# Patient Record
Sex: FEMALE | Race: WHITE | NOT HISPANIC OR LATINO | URBAN - METROPOLITAN AREA
[De-identification: names, ages, dates, MRNs, and addresses within clinical notes are randomized per-mention and may not be internally consistent; named-entity substitution may affect disease eponyms.]

---

## 2019-05-22 ENCOUNTER — EMERGENCY (EMERGENCY)
Facility: HOSPITAL | Age: 34
LOS: 1 days | Discharge: HOME | End: 2019-05-22
Attending: EMERGENCY MEDICINE
Payer: MEDICAID

## 2019-05-22 VITALS
RESPIRATION RATE: 16 BRPM | OXYGEN SATURATION: 95 % | DIASTOLIC BLOOD PRESSURE: 83 MMHG | HEART RATE: 97 BPM | WEIGHT: 210.1 LBS | SYSTOLIC BLOOD PRESSURE: 150 MMHG | TEMPERATURE: 98 F | HEIGHT: 66 IN

## 2019-05-22 PROCEDURE — 99283 EMERGENCY DEPT VISIT LOW MDM: CPT

## 2019-05-22 RX ORDER — HALOPERIDOL DECANOATE 100 MG/ML
2.5 INJECTION INTRAMUSCULAR ONCE
Refills: 0 | Status: COMPLETED | OUTPATIENT
Start: 2019-05-22 | End: 2019-05-22

## 2019-05-22 RX ADMIN — Medication 2 MILLIGRAM(S): at 23:35

## 2019-05-22 RX ADMIN — HALOPERIDOL DECANOATE 2.5 MILLIGRAM(S): 100 INJECTION INTRAMUSCULAR at 23:35

## 2019-05-23 VITALS
RESPIRATION RATE: 16 BRPM | OXYGEN SATURATION: 100 % | DIASTOLIC BLOOD PRESSURE: 70 MMHG | SYSTOLIC BLOOD PRESSURE: 130 MMHG | HEART RATE: 100 BPM

## 2019-05-23 PROCEDURE — 99285 EMERGENCY DEPT VISIT HI MDM: CPT | Mod: 25

## 2019-05-23 PROCEDURE — 82962 GLUCOSE BLOOD TEST: CPT

## 2019-05-23 PROCEDURE — 96372 THER/PROPH/DIAG INJ SC/IM: CPT | Mod: XU

## 2019-05-23 RX ADMIN — Medication 50 MILLIGRAM(S): at 06:22

## 2019-05-23 NOTE — ED PROVIDER NOTE - OBJECTIVE STATEMENT
33 y/o F with an unknown significant PMHx presents to the ED by EMS with c/o intoxication x today. Pt reports to drinking alcohol earlier tonight. Pt denies drug use, SI, or any other complaints. NKDA. 35 y/o F with an unknown significant PMHx presents to the ED by EMS with c/o intoxication x today. Pt reports to drinking alcohol earlier tonight. Pt denies drug use, SI, or any other complaints.

## 2019-05-23 NOTE — ED ADULT NURSE NOTE - NSIMPLEMENTINTERV_GEN_ALL_ED
Implemented All Fall Risk Interventions:  [place] to call system. Call bell, personal items and telephone within reach. Instruct patient to call for assistance. Room bathroom lighting operational. Non-slip footwear when patient is off stretcher. Physically safe environment: no spills, clutter or unnecessary equipment. Stretcher in lowest position, wheels locked, appropriate side rails in place. Provide visual cue, wrist band, yellow gown, etc. Monitor gait and stability. Monitor for mental status changes and reorient to person, place, and time. Review medications for side effects contributing to fall risk. Reinforce activity limits and safety measures with patient and family.
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
## 2019-05-23 NOTE — ED PROVIDER NOTE - PROGRESS NOTE DETAILS
Pt's  now in ED and states pt drinking alcohol daily. He knows pt was drinking etoh earlier today. He was in bed and heard a thud and found pt on floor, but does not know if she had seizure or just fell due to intox. Pt now awake, steady on her feet, ambulated to bathroom. No signs of etoh withdrawal. Will dc with PCP ge prn. Discussed indications for patient return to ED. Patient understood. Pt now awake, steady on her feet, ambulated to bathroom. Given librium b/c pt states that he will take pt to detox later today. Will dc with PCP fu prn. Discussed indications for patient return to ED. Patient understood. Pt now awake, steady on her feet, ambulated to bathroom. Given librium b/c pt's  states that he will take pt to detox later today. Will dc with PCP fu prn. Discussed indications for patient return to ED. Patient understood.

## 2019-10-16 ENCOUNTER — INPATIENT (INPATIENT)
Facility: HOSPITAL | Age: 34
LOS: 2 days | Discharge: LEFT BEFORE DISCHARGE | DRG: 894 | End: 2019-10-19
Attending: INTERNAL MEDICINE | Admitting: INTERNAL MEDICINE
Payer: MEDICAID

## 2019-10-16 VITALS
SYSTOLIC BLOOD PRESSURE: 140 MMHG | HEART RATE: 108 BPM | WEIGHT: 184.97 LBS | DIASTOLIC BLOOD PRESSURE: 87 MMHG | HEIGHT: 65 IN | TEMPERATURE: 98 F | OXYGEN SATURATION: 95 % | RESPIRATION RATE: 22 BRPM

## 2019-10-16 DIAGNOSIS — F10.929 ALCOHOL USE, UNSPECIFIED WITH INTOXICATION, UNSPECIFIED: ICD-10-CM

## 2019-10-16 DIAGNOSIS — F10.10 ALCOHOL ABUSE, UNCOMPLICATED: ICD-10-CM

## 2019-10-16 DIAGNOSIS — R56.9 UNSPECIFIED CONVULSIONS: ICD-10-CM

## 2019-10-16 DIAGNOSIS — Z29.9 ENCOUNTER FOR PROPHYLACTIC MEASURES, UNSPECIFIED: ICD-10-CM

## 2019-10-16 LAB
ALBUMIN SERPL ELPH-MCNC: 3.2 G/DL — LOW (ref 3.5–5)
ALBUMIN SERPL ELPH-MCNC: 3.6 G/DL — SIGNIFICANT CHANGE UP (ref 3.5–5)
ALP SERPL-CCNC: 41 U/L — SIGNIFICANT CHANGE UP (ref 40–120)
ALP SERPL-CCNC: 48 U/L — SIGNIFICANT CHANGE UP (ref 40–120)
ALT FLD-CCNC: 76 U/L DA — HIGH (ref 10–60)
ALT FLD-CCNC: 88 U/L DA — HIGH (ref 10–60)
ANION GAP SERPL CALC-SCNC: 6 MMOL/L — SIGNIFICANT CHANGE UP (ref 5–17)
ANION GAP SERPL CALC-SCNC: 7 MMOL/L — SIGNIFICANT CHANGE UP (ref 5–17)
AST SERPL-CCNC: 41 U/L — HIGH (ref 10–40)
AST SERPL-CCNC: 46 U/L — HIGH (ref 10–40)
BASOPHILS # BLD AUTO: 0.07 K/UL — SIGNIFICANT CHANGE UP (ref 0–0.2)
BASOPHILS NFR BLD AUTO: 0.9 % — SIGNIFICANT CHANGE UP (ref 0–2)
BILIRUB DIRECT SERPL-MCNC: 0.1 MG/DL — SIGNIFICANT CHANGE UP (ref 0–0.2)
BILIRUB INDIRECT FLD-MCNC: 0.2 MG/DL — SIGNIFICANT CHANGE UP (ref 0.2–1)
BILIRUB SERPL-MCNC: 0.2 MG/DL — SIGNIFICANT CHANGE UP (ref 0.2–1.2)
BILIRUB SERPL-MCNC: 0.3 MG/DL — SIGNIFICANT CHANGE UP (ref 0.2–1.2)
BUN SERPL-MCNC: 5 MG/DL — LOW (ref 7–18)
BUN SERPL-MCNC: 7 MG/DL — SIGNIFICANT CHANGE UP (ref 7–18)
CALCIUM SERPL-MCNC: 7.8 MG/DL — LOW (ref 8.4–10.5)
CALCIUM SERPL-MCNC: 8.1 MG/DL — LOW (ref 8.4–10.5)
CHLORIDE SERPL-SCNC: 107 MMOL/L — SIGNIFICANT CHANGE UP (ref 96–108)
CHLORIDE SERPL-SCNC: 113 MMOL/L — HIGH (ref 96–108)
CK SERPL-CCNC: 120 U/L — SIGNIFICANT CHANGE UP (ref 21–215)
CO2 SERPL-SCNC: 24 MMOL/L — SIGNIFICANT CHANGE UP (ref 22–31)
CO2 SERPL-SCNC: 25 MMOL/L — SIGNIFICANT CHANGE UP (ref 22–31)
CREAT SERPL-MCNC: 0.48 MG/DL — LOW (ref 0.5–1.3)
CREAT SERPL-MCNC: 0.68 MG/DL — SIGNIFICANT CHANGE UP (ref 0.5–1.3)
EOSINOPHIL # BLD AUTO: 0.08 K/UL — SIGNIFICANT CHANGE UP (ref 0–0.5)
EOSINOPHIL NFR BLD AUTO: 1 % — SIGNIFICANT CHANGE UP (ref 0–6)
ETHANOL SERPL-MCNC: 409 MG/DL — HIGH (ref 0–10)
GLUCOSE SERPL-MCNC: 76 MG/DL — SIGNIFICANT CHANGE UP (ref 70–99)
GLUCOSE SERPL-MCNC: 98 MG/DL — SIGNIFICANT CHANGE UP (ref 70–99)
HCG SERPL-ACNC: <1 MIU/ML — SIGNIFICANT CHANGE UP
HCT VFR BLD CALC: 44.8 % — SIGNIFICANT CHANGE UP (ref 34.5–45)
HGB BLD-MCNC: 14.7 G/DL — SIGNIFICANT CHANGE UP (ref 11.5–15.5)
IMM GRANULOCYTES NFR BLD AUTO: 0.6 % — SIGNIFICANT CHANGE UP (ref 0–1.5)
LACTATE SERPL-SCNC: 3.7 MMOL/L — HIGH (ref 0.7–2)
LYMPHOCYTES # BLD AUTO: 2.64 K/UL — SIGNIFICANT CHANGE UP (ref 1–3.3)
LYMPHOCYTES # BLD AUTO: 33.4 % — SIGNIFICANT CHANGE UP (ref 13–44)
MAGNESIUM SERPL-MCNC: 1.9 MG/DL — SIGNIFICANT CHANGE UP (ref 1.6–2.6)
MAGNESIUM SERPL-MCNC: 2 MG/DL — SIGNIFICANT CHANGE UP (ref 1.6–2.6)
MCHC RBC-ENTMCNC: 31 PG — SIGNIFICANT CHANGE UP (ref 27–34)
MCHC RBC-ENTMCNC: 32.8 GM/DL — SIGNIFICANT CHANGE UP (ref 32–36)
MCV RBC AUTO: 94.5 FL — SIGNIFICANT CHANGE UP (ref 80–100)
MONOCYTES # BLD AUTO: 0.68 K/UL — SIGNIFICANT CHANGE UP (ref 0–0.9)
MONOCYTES NFR BLD AUTO: 8.6 % — SIGNIFICANT CHANGE UP (ref 2–14)
NEUTROPHILS # BLD AUTO: 4.39 K/UL — SIGNIFICANT CHANGE UP (ref 1.8–7.4)
NEUTROPHILS NFR BLD AUTO: 55.5 % — SIGNIFICANT CHANGE UP (ref 43–77)
NRBC # BLD: 0 /100 WBCS — SIGNIFICANT CHANGE UP (ref 0–0)
PHOSPHATE SERPL-MCNC: 3.1 MG/DL — SIGNIFICANT CHANGE UP (ref 2.5–4.5)
PLATELET # BLD AUTO: 330 K/UL — SIGNIFICANT CHANGE UP (ref 150–400)
POTASSIUM SERPL-MCNC: 3.9 MMOL/L — SIGNIFICANT CHANGE UP (ref 3.5–5.3)
POTASSIUM SERPL-MCNC: 3.9 MMOL/L — SIGNIFICANT CHANGE UP (ref 3.5–5.3)
POTASSIUM SERPL-SCNC: 3.9 MMOL/L — SIGNIFICANT CHANGE UP (ref 3.5–5.3)
POTASSIUM SERPL-SCNC: 3.9 MMOL/L — SIGNIFICANT CHANGE UP (ref 3.5–5.3)
PROT SERPL-MCNC: 6.4 G/DL — SIGNIFICANT CHANGE UP (ref 6–8.3)
PROT SERPL-MCNC: 7 G/DL — SIGNIFICANT CHANGE UP (ref 6–8.3)
RBC # BLD: 4.74 M/UL — SIGNIFICANT CHANGE UP (ref 3.8–5.2)
RBC # FLD: 13.3 % — SIGNIFICANT CHANGE UP (ref 10.3–14.5)
SODIUM SERPL-SCNC: 139 MMOL/L — SIGNIFICANT CHANGE UP (ref 135–145)
SODIUM SERPL-SCNC: 143 MMOL/L — SIGNIFICANT CHANGE UP (ref 135–145)
WBC # BLD: 7.91 K/UL — SIGNIFICANT CHANGE UP (ref 3.8–10.5)
WBC # FLD AUTO: 7.91 K/UL — SIGNIFICANT CHANGE UP (ref 3.8–10.5)

## 2019-10-16 PROCEDURE — 99291 CRITICAL CARE FIRST HOUR: CPT

## 2019-10-16 PROCEDURE — 70450 CT HEAD/BRAIN W/O DYE: CPT | Mod: 26

## 2019-10-16 PROCEDURE — 99285 EMERGENCY DEPT VISIT HI MDM: CPT

## 2019-10-16 RX ORDER — DEXMEDETOMIDINE HYDROCHLORIDE IN 0.9% SODIUM CHLORIDE 4 UG/ML
0.1 INJECTION INTRAVENOUS
Qty: 200 | Refills: 0 | Status: DISCONTINUED | OUTPATIENT
Start: 2019-10-16 | End: 2019-10-18

## 2019-10-16 RX ORDER — THIAMINE MONONITRATE (VIT B1) 100 MG
500 TABLET ORAL THREE TIMES A DAY
Refills: 0 | Status: DISCONTINUED | OUTPATIENT
Start: 2019-10-16 | End: 2019-10-19

## 2019-10-16 RX ORDER — MIDAZOLAM HYDROCHLORIDE 1 MG/ML
2 INJECTION, SOLUTION INTRAMUSCULAR; INTRAVENOUS ONCE
Refills: 0 | Status: DISCONTINUED | OUTPATIENT
Start: 2019-10-16 | End: 2019-10-16

## 2019-10-16 RX ORDER — INFLUENZA VIRUS VACCINE 15; 15; 15; 15 UG/.5ML; UG/.5ML; UG/.5ML; UG/.5ML
0.5 SUSPENSION INTRAMUSCULAR ONCE
Refills: 0 | Status: DISCONTINUED | OUTPATIENT
Start: 2019-10-16 | End: 2019-10-19

## 2019-10-16 RX ORDER — SODIUM CHLORIDE 9 MG/ML
1000 INJECTION, SOLUTION INTRAVENOUS
Refills: 0 | Status: DISCONTINUED | OUTPATIENT
Start: 2019-10-16 | End: 2019-10-19

## 2019-10-16 RX ORDER — PHENOBARBITAL 60 MG
210 TABLET ORAL ONCE
Refills: 0 | Status: DISCONTINUED | OUTPATIENT
Start: 2019-10-16 | End: 2019-10-16

## 2019-10-16 RX ORDER — DEXMEDETOMIDINE HYDROCHLORIDE IN 0.9% SODIUM CHLORIDE 4 UG/ML
84 INJECTION INTRAVENOUS ONCE
Refills: 0 | Status: DISCONTINUED | OUTPATIENT
Start: 2019-10-16 | End: 2019-10-16

## 2019-10-16 RX ORDER — ENOXAPARIN SODIUM 100 MG/ML
40 INJECTION SUBCUTANEOUS DAILY
Refills: 0 | Status: DISCONTINUED | OUTPATIENT
Start: 2019-10-16 | End: 2019-10-19

## 2019-10-16 RX ORDER — SODIUM CHLORIDE 9 MG/ML
1000 INJECTION INTRAMUSCULAR; INTRAVENOUS; SUBCUTANEOUS ONCE
Refills: 0 | Status: COMPLETED | OUTPATIENT
Start: 2019-10-16 | End: 2019-10-16

## 2019-10-16 RX ORDER — DEXMEDETOMIDINE HYDROCHLORIDE IN 0.9% SODIUM CHLORIDE 4 UG/ML
42 INJECTION INTRAVENOUS ONCE
Refills: 0 | Status: DISCONTINUED | OUTPATIENT
Start: 2019-10-16 | End: 2019-10-18

## 2019-10-16 RX ORDER — POTASSIUM CHLORIDE 20 MEQ
10 PACKET (EA) ORAL
Refills: 0 | Status: COMPLETED | OUTPATIENT
Start: 2019-10-16 | End: 2019-10-16

## 2019-10-16 RX ORDER — HALOPERIDOL DECANOATE 100 MG/ML
2 INJECTION INTRAMUSCULAR ONCE
Refills: 0 | Status: COMPLETED | OUTPATIENT
Start: 2019-10-16 | End: 2019-10-16

## 2019-10-16 RX ADMIN — Medication 2 MILLIGRAM(S): at 17:26

## 2019-10-16 RX ADMIN — Medication 100 MILLIEQUIVALENT(S): at 20:54

## 2019-10-16 RX ADMIN — SODIUM CHLORIDE 1000 MILLILITER(S): 9 INJECTION INTRAMUSCULAR; INTRAVENOUS; SUBCUTANEOUS at 17:26

## 2019-10-16 RX ADMIN — MIDAZOLAM HYDROCHLORIDE 2 MILLIGRAM(S): 1 INJECTION, SOLUTION INTRAMUSCULAR; INTRAVENOUS at 16:28

## 2019-10-16 RX ADMIN — DEXMEDETOMIDINE HYDROCHLORIDE IN 0.9% SODIUM CHLORIDE 2.1 MICROGRAM(S)/KG/HR: 4 INJECTION INTRAVENOUS at 20:54

## 2019-10-16 RX ADMIN — MIDAZOLAM HYDROCHLORIDE 2 MILLIGRAM(S): 1 INJECTION, SOLUTION INTRAMUSCULAR; INTRAVENOUS at 18:00

## 2019-10-16 RX ADMIN — Medication 2 MILLIGRAM(S): at 16:20

## 2019-10-16 RX ADMIN — SODIUM CHLORIDE 75 MILLILITER(S): 9 INJECTION, SOLUTION INTRAVENOUS at 23:51

## 2019-10-16 RX ADMIN — Medication 2 MILLIGRAM(S): at 11:08

## 2019-10-16 RX ADMIN — Medication 100 MILLIEQUIVALENT(S): at 22:15

## 2019-10-16 RX ADMIN — Medication 2 MILLIGRAM(S): at 22:15

## 2019-10-16 RX ADMIN — Medication 100 MILLIEQUIVALENT(S): at 19:53

## 2019-10-16 RX ADMIN — Medication 2 MILLIGRAM(S): at 16:50

## 2019-10-16 RX ADMIN — Medication 105 MILLIGRAM(S): at 23:51

## 2019-10-16 RX ADMIN — HALOPERIDOL DECANOATE 2 MILLIGRAM(S): 100 INJECTION INTRAMUSCULAR at 17:40

## 2019-10-16 RX ADMIN — MIDAZOLAM HYDROCHLORIDE 2 MILLIGRAM(S): 1 INJECTION, SOLUTION INTRAMUSCULAR; INTRAVENOUS at 11:00

## 2019-10-16 RX ADMIN — ENOXAPARIN SODIUM 40 MILLIGRAM(S): 100 INJECTION SUBCUTANEOUS at 22:17

## 2019-10-16 RX ADMIN — MIDAZOLAM HYDROCHLORIDE 2 MILLIGRAM(S): 1 INJECTION, SOLUTION INTRAMUSCULAR; INTRAVENOUS at 16:50

## 2019-10-16 NOTE — ED ADULT NURSE NOTE - ED STAT RN HANDOFF DETAILS
Patient admitted to ICU with constant observation staff member at bedside. Patient assigned to ICU bed 4 , report given to WALDO Garcia. Patient to be transported via stretcher with cardiac monitor, by RN, MD and transporter with cardiac monitor in stable condition in no acute distress.

## 2019-10-16 NOTE — ED PROVIDER NOTE - CONSTITUTIONAL, MLM
normal... Well appearing, well nourished, awake, alert, oriented to person, place, time/situation and is agitated.

## 2019-10-16 NOTE — H&P ADULT - PROBLEM SELECTOR PLAN 2
-History of shaking episode with unresponsiveness at home  -No tongue bite or urinary/bowel incontinence  -no prior hx of seizures. could be secondary to alcohol intoxication   -CT head No acute intracranial hemorrhage, mass effect, or evidence of acute   vascular territorial infarction.  -f/u EEG  -Neurology consult Dr Dukes
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
## 2019-10-16 NOTE — H&P ADULT - ATTENDING COMMENTS
Patient seen and examined with the resident and agree with above    Plan    Patient with severe alcohol intoxication and possible seizure  ICU  Precedex drip and ATC IV ativan  Thiamine for chronic alcohol abuse and possible thiamine deficiency    D/W The patients

## 2019-10-16 NOTE — ED ADULT TRIAGE NOTE - CHIEF COMPLAINT QUOTE
Pt brought to ED by Ambulance, came off stretcher in 25H and ran through ED hallway agitated and screaming. H/o alcohol abuse

## 2019-10-16 NOTE — ED ADULT NURSE NOTE - NSIMPLEMENTINTERV_GEN_ALL_ED
Implemented All Fall with Harm Risk Interventions:  [place] to call system. Call bell, personal items and telephone within reach. Instruct patient to call for assistance. Room bathroom lighting operational. Non-slip footwear when patient is off stretcher. Physically safe environment: no spills, clutter or unnecessary equipment. Stretcher in lowest position, wheels locked, appropriate side rails in place. Provide visual cue, wrist band, yellow gown, etc. Monitor gait and stability. Monitor for mental status changes and reorient to person, place, and time. Review medications for side effects contributing to fall risk. Reinforce activity limits and safety measures with patient and family. Provide visual clues: red socks.

## 2019-10-16 NOTE — ED ADULT NURSE NOTE - OBJECTIVE STATEMENT
Patient present to ED BIBA by  for ETOH. As per  wife has been drink 1/2 gallon of tequila for past 5 days.. As per  patient has depression and that's why she drinks

## 2019-10-16 NOTE — CONSULT NOTE ADULT - SUBJECTIVE AND OBJECTIVE BOX
Patient was observed by  to have generalized body shaking, and was found to have elevated ethanol level.  She had been agitated, and received lorazepam and midazolam.    On current exam, patient is asleep with pinpoint pupils, nonverbal and not following commands, without rigidity or tremors x 4  CT Head was unremarkable    Dx: Alcohol withdrawal vs. New-onset seizure    Recs:  - EEG approved  - [facility] protocol for alcohol withdrawal  - No new head imaging now      NOTE TO BE COMPLETED - PLEASE REFER TO ABOVE ONLY AND IGNORE INFORMATION BELOW        NEUROLOGY CONSULT NOTE    NAME:  KENDRICK SANCHEZ    CHIEF COMPLAINT:  Patient is a 34y old  Female who presents with a chief complaint of Alcohol intoxication (16 Oct 2019 18:58)      HPI:  Patient is a 35 y/o F with PMH of depression comes in with witnessed seizure by spouse. As per Spouse patient is been drinking extensively for 2 weeks. Today he found patient shaking and unresponsive. He then called EMS. En route to the hospital patient became awake started to become agitated. She received 6 mg ativan and 6mg versed in ED. History was obtained by  at bed side. She is AAO x 2 to self and place. Asking for her anti depressant medication. She then became agitated and received a dose of haldol with versed ROS are limited due to mental status secondary to sedation. Her Blood alcohol level was elevated to 409. ICU was consulted for severe alcohol intoxication. (16 Oct 2019 18:58)      NEURO HPI:      PAST MEDICAL & SURGICAL HISTORY:  No pertinent past medical history  No significant past surgical history      MEDICATIONS:  dexmedetomidine Bolus 42 MICROGram(s) IV Bolus once  dexmedetomidine Infusion 0.1 MICROgram(s)/kG/Hr IV Continuous <Continuous>  enoxaparin Injectable 40 milliGRAM(s) SubCutaneous daily  LORazepam   Injectable 2 milliGRAM(s) IV Push every 4 hours  LORazepam   Injectable 2 milliGRAM(s) IV Push every 2 hours PRN  multivitamin/thiamine/folic acid in sodium chloride 0.9% 1000 milliLiter(s) IV Continuous <Continuous>  potassium chloride  10 mEq/100 mL IVPB 10 milliEquivalent(s) IV Intermittent every 1 hour  thiamine IVPB 500 milliGRAM(s) IV Intermittent three times a day      ALLERGIES:  No Known Allergies      FAMILY HISTORY:      SOCIAL HISTORY:  No toxic habits reported    REVIEW OF SYSTEMS:  GENERAL: No fever, weight changes, fatigue  EYES: No eye pain or discharge  EAR/NOSE/MOUTH/THROAT: No sinus or throat pain; No difficulty hearing  NECK: No pain or stiffness  RESPIRATORY: No cough, wheezing, chills, or hemoptysis  CARDIOVASCULAR: No chest pain, palpitations, shortness of breath, or dyspnea on exertion  GASTROINTESTINAL: No abdominal pain, nausea, vomiting, hematemesis, diarrhea, or constipation  GENITOURINARY: No dysuria, frequency, hematuria, or incontinence  SKIN: No rashes or lesions  ENDOCRINE: No heat or cold intolerance  HEMATOLOGIC: No easy bruising or bleeding  PSYCHIATRIC: No depression, anxiety, or mood swings  MUSCULOSKELETAL: No joint pain or swelling  NEUROLOGICAL: As per HPI      OBJECTIVE:    Vital Signs Last 24 Hrs  T(C): 98.1 (16 Oct 2019 19:20), Max: 98.1 (16 Oct 2019 19:20)  T(F): 208.5 (16 Oct 2019 19:20), Max: 208.5 (16 Oct 2019 19:20)  HR: 102 (16 Oct 2019 19:20) (102 - 110)  BP: 108/76 (16 Oct 2019 19:20) (108/76 - 140/87)  BP(mean): --  RR: 19 (16 Oct 2019 19:20) (19 - 22)  SpO2: 95% (16 Oct 2019 19:20) (94% - 97%)    General Examination:  General: No acute distress  HEENT: Atraumatic, Normocephalic  Respiratory: CTA B/l.  No crackles, rhonchi, or wheezes.  Cardiovascular: RRR.  Normal S1 & S2.  Normal b/l radial and pedal pulses.    Neurological Examination:  General / Mental Status: AAO x 3.  No aphasia or dysarthria.  Naming and repetition intact.  Cranial Nerves: VFF x 4.  PERRL.  EOMI x 2, No nystagmus or diplopia.  B/l V1-V3 equal and intact.  Symmetric facial movement and palate elevation.  B/l hearing equal to finger rub.  5/5 strength with b/l sternocleidomastoid & trapezius.  Midline tongue protrusion, with no atrophy or fasciculations.  Motor: Normal bulk & tone in all four extremities.  5/5 strength throughout all four extremities.  No downward drift, rigidity, spasticity, or tremors in any of the four extremities.  Sensory: Intact to light touch and pinprick in all four extremities.  Negative Romberg.  Reflex: 2+ and symmetric at b/l biceps, triceps, brachioradialis, patellae, and ankles.  Downgoing toes b/l.  Coordination: No dysmetria with b/l finger-to-nose and heel raise tests.  Symmetric rapid alternating movements b/l.  Gait: Normal, narrow-based gait.  No difficulty with tiptoe, heel, and tandem gaits.      Laboratory Values:    CBC Full  -  ( 16 Oct 2019 11:50 )  WBC Count : 7.91 K/uL  RBC Count : 4.74 M/uL  Hemoglobin : 14.7 g/dL  Hematocrit : 44.8 %  Platelet Count - Automated : 330 K/uL  Mean Cell Volume : 94.5 fl  Mean Cell Hemoglobin : 31.0 pg  Mean Cell Hemoglobin Concentration : 32.8 gm/dL  Auto Neutrophil # : 4.39 K/uL  Auto Lymphocyte # : 2.64 K/uL  Auto Monocyte # : 0.68 K/uL  Auto Eosinophil # : 0.08 K/uL  Auto Basophil # : 0.07 K/uL  Auto Neutrophil % : 55.5 %  Auto Lymphocyte % : 33.4 %  Auto Monocyte % : 8.6 %  Auto Eosinophil % : 1.0 %  Auto Basophil % : 0.9 %      10-16    143  |  113<H>  |  5<L>  ----------------------------<  76  3.9   |  24  |  0.48<L>    Ca    7.8<L>      16 Oct 2019 18:04  Phos  3.1     10-16  Mg     1.9     10-16    TPro  6.4  /  Alb  3.2<L>  /  TBili  0.3  /  DBili  0.1  /  AST  46<H>  /  ALT  76<H>  /  AlkPhos  41  10-16                      Neuroimaging:      Please contact the Neurology consult service with any questions.    Juanjose Dukes MD   of Neurology  [facility] at [facility] NEUROLOGY CONSULT NOTE    NAME:  KENDRICK SANCHEZ    CHIEF COMPLAINT:  Patient is a 34y old  Female who presents with a chief complaint of Alcohol intoxication (16 Oct 2019 18:58)    HPI:  Patient is a 33 y/o F with PMH of depression comes in with witnessed seizure by spouse. As per Spouse patient is been drinking extensively for 2 weeks. Today he found patient shaking and unresponsive. He then called EMS. En route to the hospital patient became awake started to become agitated. She received 6 mg ativan and 6mg versed in ED. History was obtained by  at bed side. She is AAO x 2 to self and place. Asking for her anti depressant medication. She then became agitated and received a dose of haldol with versed ROS are limited due to mental status secondary to sedation. Her Blood alcohol level was elevated to 409. ICU was consulted for severe alcohol intoxication. (16 Oct 2019 18:58)    NEURO HPI:  Patient was observed by  to have generalized body shaking, and was found to have elevated ethanol level.  She had been agitated, and received lorazepam and midazolam.    PAST MEDICAL & SURGICAL HISTORY:  No pertinent past medical history  No significant past surgical history    MEDICATIONS:  dexmedetomidine Bolus 42 MICROGram(s) IV Bolus once  dexmedetomidine Infusion 0.1 MICROgram(s)/kG/Hr IV Continuous <Continuous>  enoxaparin Injectable 40 milliGRAM(s) SubCutaneous daily  LORazepam   Injectable 2 milliGRAM(s) IV Push every 4 hours  LORazepam   Injectable 2 milliGRAM(s) IV Push every 2 hours PRN  multivitamin/thiamine/folic acid in sodium chloride 0.9% 1000 milliLiter(s) IV Continuous <Continuous>  potassium chloride  10 mEq/100 mL IVPB 10 milliEquivalent(s) IV Intermittent every 1 hour  thiamine IVPB 500 milliGRAM(s) IV Intermittent three times a day    ALLERGIES:  No Known Allergies    FAMILY HISTORY:  No family history of seizure reported    SOCIAL HISTORY:  Alcohol use as in HPI  No tobacco or recreational drug use reported    REVIEW OF SYSTEMS:  GENERAL: No fever, weight changes  EYES: No eye pain or discharge  EAR/NOSE/MOUTH/THROAT: No sinus or throat pain  NECK: No pain or stiffness  RESPIRATORY: No cough, wheezing  CARDIOVASCULAR: No chest pain, palpitations  GASTROINTESTINAL: No abdominal pain, nausea, vomiting  GENITOURINARY: No dysuria, frequency  SKIN: No rashes or lesions  ENDOCRINE: No heat or cold intolerance  HEMATOLOGIC: No easy bruising or bleeding  PSYCHIATRIC: Recent agitation  MUSCULOSKELETAL: No joint pain or swelling  NEUROLOGICAL: As per HPI      OBJECTIVE:    Vital Signs Last 24 Hrs  T(C): 98.1 (16 Oct 2019 19:20), Max: 98.1 (16 Oct 2019 19:20)  T(F): 208.5 (16 Oct 2019 19:20), Max: 208.5 (16 Oct 2019 19:20) <may be logged incorrectly>  HR: 102 (16 Oct 2019 19:20) (102 - 110)  BP: 108/76 (16 Oct 2019 19:20) (108/76 - 140/87)  RR: 19 (16 Oct 2019 19:20) (19 - 22)  SpO2: 95% (16 Oct 2019 19:20) (94% - 97%)    General Examination:  General: No acute distress  HEENT: Atraumatic, Normocephalic  Respiratory: CTA B/l.  No crackles, rhonchi, or wheezes.  Cardiovascular: Rapid rate, Regular rhythm.  Normal S1 & S2.  Normal b/l radial and pedal pulses.    Neurological Examination:  General / Mental Status: AAO x 3.  No aphasia or dysarthria.  Naming and repetition intact.  Cranial Nerves: VFF x 4.  PERRL.  EOMI x 2, No nystagmus or diplopia.  B/l V1-V3 equal and intact.  Symmetric facial movement and palate elevation.  B/l hearing equal to finger rub.  5/5 strength with b/l sternocleidomastoid & trapezius.  Midline tongue protrusion, with no atrophy or fasciculations.  Motor: Normal bulk & tone in all four extremities.  5/5 strength throughout all four extremities.  No downward drift, rigidity, spasticity, or tremors in any of the four extremities.  Sensory: Intact to light touch and pinprick in all four extremities.  Negative Romberg.  Reflex: 2+ and symmetric at b/l biceps, triceps, brachioradialis, patellae, and ankles.  Downgoing toes b/l.  Coordination: No dysmetria with b/l finger-to-nose and heel raise tests.  Symmetric rapid alternating movements b/l.  Gait and Romberg      On current exam, patient is asleep with pinpoint pupils, nonverbal and not following commands, without rigidity or tremors x 4  CT Head was unremarkable    Dx: Alcohol withdrawal vs. New-onset seizure    Recs:  - EEG approved  - [facility] protocol for alcohol withdrawal  - No new head imaging now    Laboratory Values:    CBC Full  -  ( 16 Oct 2019 11:50 )  WBC Count : 7.91 K/uL  RBC Count : 4.74 M/uL  Hemoglobin : 14.7 g/dL  Hematocrit : 44.8 %  Platelet Count - Automated : 330 K/uL  Mean Cell Volume : 94.5 fl  Mean Cell Hemoglobin : 31.0 pg  Mean Cell Hemoglobin Concentration : 32.8 gm/dL  Auto Neutrophil # : 4.39 K/uL  Auto Lymphocyte # : 2.64 K/uL  Auto Monocyte # : 0.68 K/uL  Auto Eosinophil # : 0.08 K/uL  Auto Basophil # : 0.07 K/uL  Auto Neutrophil % : 55.5 %  Auto Lymphocyte % : 33.4 %  Auto Monocyte % : 8.6 %  Auto Eosinophil % : 1.0 %  Auto Basophil % : 0.9 %      10-16    143  |  113<H>  |  5<L>  ----------------------------<  76  3.9   |  24  |  0.48<L>    Ca    7.8<L>      16 Oct 2019 18:04  Phos  3.1     10-16  Mg     1.9     10-16    TPro  6.4  /  Alb  3.2<L>  /  TBili  0.3  /  DBili  0.1  /  AST  46<H>  /  ALT  76<H>  /  AlkPhos  41  10-16                      Neuroimaging:      Please contact the Neurology consult service with any questions.    Juanjose Dukes MD   of Neurology  [facility] [employer] [facility] at [facility] NEUROLOGY CONSULT NOTE    NAME:  KENDRICK SANCHEZ    CHIEF COMPLAINT:  Patient is a 34y old  Female who presents with a chief complaint of Alcohol intoxication (16 Oct 2019 18:58)    HPI:  Patient is a 35 y/o F with PMH of depression comes in with witnessed seizure by spouse. As per Spouse patient is been drinking extensively for 2 weeks. Today he found patient shaking and unresponsive. He then called EMS. En route to the hospital patient became awake started to become agitated. She received 6 mg ativan and 6mg versed in ED. History was obtained by  at bed side. She is AAO x 2 to self and place. Asking for her anti depressant medication. She then became agitated and received a dose of haldol with versed ROS are limited due to mental status secondary to sedation. Her Blood alcohol level was elevated to 409. ICU was consulted for severe alcohol intoxication. (16 Oct 2019 18:58)    NEURO HPI:  34F patient was observed by  to have generalized body shaking, and was found to have elevated ethanol level. She had been agitated, and received lorazepam and midazolam.    PAST MEDICAL & SURGICAL HISTORY:  No pertinent past medical history  No significant past surgical history    MEDICATIONS:  dexmedetomidine Bolus 42 MICROGram(s) IV Bolus once  dexmedetomidine Infusion 0.1 MICROgram(s)/kG/Hr IV Continuous <Continuous>  enoxaparin Injectable 40 milliGRAM(s) SubCutaneous daily  LORazepam   Injectable 2 milliGRAM(s) IV Push every 4 hours  LORazepam   Injectable 2 milliGRAM(s) IV Push every 2 hours PRN  multivitamin/thiamine/folic acid in sodium chloride 0.9% 1000 milliLiter(s) IV Continuous <Continuous>  potassium chloride  10 mEq/100 mL IVPB 10 milliEquivalent(s) IV Intermittent every 1 hour  thiamine IVPB 500 milliGRAM(s) IV Intermittent three times a day    ALLERGIES:  No Known Allergies    FAMILY HISTORY:  No family history of seizure reported    SOCIAL HISTORY:  Alcohol use as in HPI  No tobacco or recreational drug use reported    REVIEW OF SYSTEMS:  GENERAL: No fever, weight changes  EYES: No eye pain or discharge  EAR/NOSE/MOUTH/THROAT: No sinus or throat pain  NECK: No pain or stiffness  RESPIRATORY: No cough, wheezing  CARDIOVASCULAR: No chest pain, palpitations  GASTROINTESTINAL: No abdominal pain, nausea, vomiting  GENITOURINARY: No dysuria, frequency  SKIN: No rashes or lesions  ENDOCRINE: No heat or cold intolerance  HEMATOLOGIC: No easy bruising or bleeding  PSYCHIATRIC: Recent agitation  MUSCULOSKELETAL: No joint pain or swelling  NEUROLOGICAL: As per HPI      OBJECTIVE:    Vital Signs Last 24 Hrs  T(C): 98.1 (16 Oct 2019 19:20), Max: 98.1 (16 Oct 2019 19:20)  T(F): 208.5 (16 Oct 2019 19:20), Max: 208.5 (16 Oct 2019 19:20) <may be logged incorrectly>  HR: 102 (16 Oct 2019 19:20) (102 - 110)  BP: 108/76 (16 Oct 2019 19:20) (108/76 - 140/87)  RR: 19 (16 Oct 2019 19:20) (19 - 22)  SpO2: 95% (16 Oct 2019 19:20) (94% - 97%)    General Examination:  General: No acute distress  HEENT: Atraumatic, Normocephalic  Respiratory: Rapid rate, CTA B/l.  No crackles, rhonchi, or wheezes.  Cardiovascular: Rapid rate, Regular rhythm.  Normal S1 & S2.  Normal b/l radial and pedal pulses.    Neurological Exam:  General / Mental Status: Asleep with difficulty arousing to voice.  Nonverbal, Does not follow commands.  Neurological exam is limited.  Cranial Nerves: Pinpoint pupils present, not further reactive to light.  Blink to threat absent b/l, Does not track finger movements with eyes b/l.  No response to pinprick at b/l V1-V3 nor to snap at b/l ears.  No apparent facial asymmetry.  Midline palate and tongue.  No resistance to passive motion of neck b/l.  Motor: Diminished bulk and tone in all four extremities.  Patient does not participate in formal muscle strength testing; all four extremities drop to bed after passive elevation.  No spontaneous movement, tremors, rigidity, or spasticity in any of the four extremities.  Sensation: Withdraws to nailbed pressure in all four extremities.  Reflexes: 1+ and symmetric at b/l biceps, triceps, brachioradialis, patellae, and ankles.  Toes mute b/l.  Unable to assess coordination, Romberg sign, or gait in minimally responsive patient.      Laboratory Values:    CBC Full  -  ( 16 Oct 2019 11:50 )  WBC Count : 7.91 K/uL  RBC Count : 4.74 M/uL  Hemoglobin : 14.7 g/dL  Hematocrit : 44.8 %  Platelet Count - Automated : 330 K/uL  Mean Cell Volume : 94.5 fl  Mean Cell Hemoglobin : 31.0 pg  Mean Cell Hemoglobin Concentration : 32.8 gm/dL  Auto Neutrophil # : 4.39 K/uL  Auto Lymphocyte # : 2.64 K/uL  Auto Monocyte # : 0.68 K/uL  Auto Eosinophil # : 0.08 K/uL  Auto Basophil # : 0.07 K/uL  Auto Neutrophil % : 55.5 %  Auto Lymphocyte % : 33.4 %  Auto Monocyte % : 8.6 %  Auto Eosinophil % : 1.0 %  Auto Basophil % : 0.9 %      10-16    143  |  113<H>  |  5<L>  ----------------------------<  76  3.9   |  24  |  0.48<L>    Ca    7.8<L>      16 Oct 2019 18:04  Phos  3.1     10-16  Mg     1.9     10-16    TPro  6.4  /  Alb  3.2<L>  /  TBili  0.3  /  DBili  0.1  /  AST  46<H>  /  ALT  76<H>  /  AlkPhos  41  10-16      Neuroimaging:    CT Head (10/16/19): No acute intracranial abnormality      Assessment:  34F with alcohol withdrawal seizure vs. new-onset epileptic seizure in the setting of heavy alcohol use      Recommendations:    - Routine EEG approved to evaluate for seizure tendency vs. subclinical seizures    - [facility] protocol for alcohol withdrawal    - No new head imaging now      Please contact the Neurology consult service with any questions.    Juanjose Dukes MD   of Neurology  [facility] at [facility]

## 2019-10-16 NOTE — H&P ADULT - HISTORY OF PRESENT ILLNESS
Patient is a 33 y/o F with PMH of depression comes in with witnessed seizure by spouse. As per Spouse patient is been drinking extensively for 2 weeks. Today he found patient shaking and unresponsive. He then called EMS. En route to the hospital patient became awake started to become agitated. She received 6 mg ativan and 6mg versed in ED. History was obtained by  at bed side. She is AAO x 2 to self and place. Asking for her anti depressant medication. She then became agitated and received a dose of haldol with versed ROS are limited due to mental status secondary to sedation. Her Blood alcohol level was elevated to 409. ICU was consulted for severe alcohol intoxication.

## 2019-10-16 NOTE — H&P ADULT - NSHPSOCIALHISTORY_GEN_ALL_CORE
drinks etoh every day. unable to provide remaining social history drinks etoh every day all day  Smokes up to 2 packs a day

## 2019-10-16 NOTE — H&P ADULT - NSHPPHYSICALEXAM_GEN_ALL_CORE
Vital Signs Last 24 Hrs  T(C): 36.8 (16 Oct 2019 16:43), Max: 36.8 (16 Oct 2019 16:43)  T(F): 98.3 (16 Oct 2019 16:43), Max: 98.3 (16 Oct 2019 16:43)  HR: 110 (16 Oct 2019 18:01) (105 - 110)  BP: 123/76 (16 Oct 2019 18:01) (119/62 - 140/87)  BP(mean): --  RR: 20 (16 Oct 2019 18:01) (20 - 22)  SpO2: 94% (16 Oct 2019 18:01) (94% - 97%)    ________________________________________________  PHYSICAL EXAM:  GENERAL: NAD  HEENT: Normocephalic;  conjunctivae and sclerae clear; dry mucous membranes;   NECK : supple  CHEST/LUNG: Clear to auscultation bilaterally with good air entry   HEART: S1 S2  regular; no murmurs, gallops or rubs  ABDOMEN: Soft, Nontender, Nondistended; Bowel sounds present  EXTREMITIES: no cyanosis; no edema; no calf tenderness  SKIN: warm and dry; no rash  NERVOUS SYSTEM:  sedated. Arousal to verbal stimuli. Moving all four extremities equally

## 2019-10-16 NOTE — ED PROVIDER NOTE - OBJECTIVE STATEMENT
Pt is a 34y F with no significant PMHx and no significant PSHx presenting to the ED for a witnessed seizure by . As per , pt last drank alcohol 5 days prior. Pt has NKDA and denies any additional complaints at this time. Pt is a 34y F with no significant PMHx and no significant PSHx presenting to the ED for a witnessed seizure by . As per , patient has been binge drinking for 5 days. last drink this morning. Pt has NKDA and denies any additional complaints at this time.

## 2019-10-16 NOTE — H&P ADULT - PROBLEM SELECTOR PLAN 3
RISK                                                          Points  [] Previous VTE                                           3  [] Thrombophilia                                        2  [] Lower limb paralysis                              2   [] Current Cancer                                       2   [x] Immobilization > 24 hrs                        1  [x] ICU/CCU stay > 24 hours                       1  [] Age > 60                                                   1    DVT prophylaxis with Lovenox

## 2019-10-16 NOTE — H&P ADULT - PROBLEM SELECTOR PLAN 1
-pt comes in with alcohol intoxication  -BAL elevated  -Will start on dexmedetomidine bolus and infusion with RAAS -2   -Ativan 2mg Q4   -Ativan 2mg Q2 prn for CIWA >7   -Started banana bag  -Start high dose thiamine 500mg TID x 5 days   -keep NPO   -History of depression, Primary team to obtain home medications list when patient is more sobar -pt comes in with alcohol intoxication  -BAL elevated  -Will start on dexmedetomidine bolus and infusion with RAAS -2   -Ativan 2mg Q4   -Ativan 2mg Q2 prn for CIWA >7   -Started banana bag  -Start high dose thiamine 500mg TID x 5 days   -keep NPO   -History of depression, Primary team to obtain home medications list when patient is more sober

## 2019-10-16 NOTE — CONSULT NOTE ADULT - ATTENDING COMMENTS
I counseled the primary team about pursuing EEG evaluation to determine if the patient has seizure tendency or subclinical seizures.

## 2019-10-17 LAB
ALBUMIN SERPL ELPH-MCNC: 2.6 G/DL — LOW (ref 3.5–5)
ALP SERPL-CCNC: 38 U/L — LOW (ref 40–120)
ALT FLD-CCNC: 63 U/L DA — HIGH (ref 10–60)
ANION GAP SERPL CALC-SCNC: 8 MMOL/L — SIGNIFICANT CHANGE UP (ref 5–17)
AST SERPL-CCNC: 35 U/L — SIGNIFICANT CHANGE UP (ref 10–40)
BASOPHILS # BLD AUTO: 0.06 K/UL — SIGNIFICANT CHANGE UP (ref 0–0.2)
BASOPHILS NFR BLD AUTO: 0.6 % — SIGNIFICANT CHANGE UP (ref 0–2)
BILIRUB SERPL-MCNC: 0.5 MG/DL — SIGNIFICANT CHANGE UP (ref 0.2–1.2)
BUN SERPL-MCNC: 7 MG/DL — SIGNIFICANT CHANGE UP (ref 7–18)
CALCIUM SERPL-MCNC: 7.7 MG/DL — LOW (ref 8.4–10.5)
CHLORIDE SERPL-SCNC: 111 MMOL/L — HIGH (ref 96–108)
CO2 SERPL-SCNC: 21 MMOL/L — LOW (ref 22–31)
CREAT SERPL-MCNC: 0.51 MG/DL — SIGNIFICANT CHANGE UP (ref 0.5–1.3)
EOSINOPHIL # BLD AUTO: 0.24 K/UL — SIGNIFICANT CHANGE UP (ref 0–0.5)
EOSINOPHIL NFR BLD AUTO: 2.5 % — SIGNIFICANT CHANGE UP (ref 0–6)
GLUCOSE SERPL-MCNC: 65 MG/DL — LOW (ref 70–99)
HCT VFR BLD CALC: 38.4 % — SIGNIFICANT CHANGE UP (ref 34.5–45)
HGB BLD-MCNC: 12.4 G/DL — SIGNIFICANT CHANGE UP (ref 11.5–15.5)
IMM GRANULOCYTES NFR BLD AUTO: 0.4 % — SIGNIFICANT CHANGE UP (ref 0–1.5)
LACTATE SERPL-SCNC: 0.7 MMOL/L — SIGNIFICANT CHANGE UP (ref 0.7–2)
LYMPHOCYTES # BLD AUTO: 2.73 K/UL — SIGNIFICANT CHANGE UP (ref 1–3.3)
LYMPHOCYTES # BLD AUTO: 28.1 % — SIGNIFICANT CHANGE UP (ref 13–44)
MAGNESIUM SERPL-MCNC: 1.6 MG/DL — SIGNIFICANT CHANGE UP (ref 1.6–2.6)
MCHC RBC-ENTMCNC: 31 PG — SIGNIFICANT CHANGE UP (ref 27–34)
MCHC RBC-ENTMCNC: 32.3 GM/DL — SIGNIFICANT CHANGE UP (ref 32–36)
MCV RBC AUTO: 96 FL — SIGNIFICANT CHANGE UP (ref 80–100)
MONOCYTES # BLD AUTO: 0.68 K/UL — SIGNIFICANT CHANGE UP (ref 0–0.9)
MONOCYTES NFR BLD AUTO: 7 % — SIGNIFICANT CHANGE UP (ref 2–14)
NEUTROPHILS # BLD AUTO: 5.97 K/UL — SIGNIFICANT CHANGE UP (ref 1.8–7.4)
NEUTROPHILS NFR BLD AUTO: 61.4 % — SIGNIFICANT CHANGE UP (ref 43–77)
NRBC # BLD: 0 /100 WBCS — SIGNIFICANT CHANGE UP (ref 0–0)
PHOSPHATE SERPL-MCNC: 2.7 MG/DL — SIGNIFICANT CHANGE UP (ref 2.5–4.5)
PLATELET # BLD AUTO: 267 K/UL — SIGNIFICANT CHANGE UP (ref 150–400)
POTASSIUM SERPL-MCNC: 4 MMOL/L — SIGNIFICANT CHANGE UP (ref 3.5–5.3)
POTASSIUM SERPL-SCNC: 4 MMOL/L — SIGNIFICANT CHANGE UP (ref 3.5–5.3)
PROT SERPL-MCNC: 5.6 G/DL — LOW (ref 6–8.3)
RBC # BLD: 4 M/UL — SIGNIFICANT CHANGE UP (ref 3.8–5.2)
RBC # FLD: 13.2 % — SIGNIFICANT CHANGE UP (ref 10.3–14.5)
SODIUM SERPL-SCNC: 140 MMOL/L — SIGNIFICANT CHANGE UP (ref 135–145)
WBC # BLD: 9.72 K/UL — SIGNIFICANT CHANGE UP (ref 3.8–10.5)
WBC # FLD AUTO: 9.72 K/UL — SIGNIFICANT CHANGE UP (ref 3.8–10.5)

## 2019-10-17 PROCEDURE — 73030 X-RAY EXAM OF SHOULDER: CPT | Mod: 26,LT

## 2019-10-17 PROCEDURE — 99291 CRITICAL CARE FIRST HOUR: CPT

## 2019-10-17 PROCEDURE — 95819 EEG AWAKE AND ASLEEP: CPT | Mod: 26

## 2019-10-17 RX ORDER — NICOTINE POLACRILEX 2 MG
1 GUM BUCCAL DAILY
Refills: 0 | Status: DISCONTINUED | OUTPATIENT
Start: 2019-10-17 | End: 2019-10-19

## 2019-10-17 RX ORDER — SODIUM CHLORIDE 9 MG/ML
1000 INJECTION, SOLUTION INTRAVENOUS ONCE
Refills: 0 | Status: DISCONTINUED | OUTPATIENT
Start: 2019-10-17 | End: 2019-10-17

## 2019-10-17 RX ORDER — SODIUM CHLORIDE 9 MG/ML
1000 INJECTION, SOLUTION INTRAVENOUS
Refills: 0 | Status: DISCONTINUED | OUTPATIENT
Start: 2019-10-17 | End: 2019-10-17

## 2019-10-17 RX ORDER — ONDANSETRON 8 MG/1
4 TABLET, FILM COATED ORAL EVERY 6 HOURS
Refills: 0 | Status: DISCONTINUED | OUTPATIENT
Start: 2019-10-17 | End: 2019-10-19

## 2019-10-17 RX ORDER — LANOLIN ALCOHOL/MO/W.PET/CERES
3 CREAM (GRAM) TOPICAL AT BEDTIME
Refills: 0 | Status: DISCONTINUED | OUTPATIENT
Start: 2019-10-17 | End: 2019-10-19

## 2019-10-17 RX ADMIN — Medication 2 MILLIGRAM(S): at 10:24

## 2019-10-17 RX ADMIN — ENOXAPARIN SODIUM 40 MILLIGRAM(S): 100 INJECTION SUBCUTANEOUS at 11:52

## 2019-10-17 RX ADMIN — Medication 50 MILLIGRAM(S): at 18:01

## 2019-10-17 RX ADMIN — Medication 105 MILLIGRAM(S): at 07:03

## 2019-10-17 RX ADMIN — Medication 3 MILLIGRAM(S): at 20:25

## 2019-10-17 RX ADMIN — Medication 1 PATCH: at 18:01

## 2019-10-17 RX ADMIN — SODIUM CHLORIDE 125 MILLILITER(S): 9 INJECTION, SOLUTION INTRAVENOUS at 06:33

## 2019-10-17 RX ADMIN — Medication 105 MILLIGRAM(S): at 14:15

## 2019-10-17 RX ADMIN — DEXMEDETOMIDINE HYDROCHLORIDE IN 0.9% SODIUM CHLORIDE 2.1 MICROGRAM(S)/KG/HR: 4 INJECTION INTRAVENOUS at 06:33

## 2019-10-17 RX ADMIN — Medication 105 MILLIGRAM(S): at 20:55

## 2019-10-17 RX ADMIN — Medication 2 MILLIGRAM(S): at 02:18

## 2019-10-17 RX ADMIN — ONDANSETRON 4 MILLIGRAM(S): 8 TABLET, FILM COATED ORAL at 18:01

## 2019-10-17 RX ADMIN — Medication 2 MILLIGRAM(S): at 20:25

## 2019-10-17 RX ADMIN — DEXMEDETOMIDINE HYDROCHLORIDE IN 0.9% SODIUM CHLORIDE 2.1 MICROGRAM(S)/KG/HR: 4 INJECTION INTRAVENOUS at 00:12

## 2019-10-17 RX ADMIN — Medication 2 MILLIGRAM(S): at 06:33

## 2019-10-17 RX ADMIN — Medication 1 PATCH: at 11:49

## 2019-10-17 RX ADMIN — Medication 50 MILLIGRAM(S): at 11:48

## 2019-10-17 NOTE — EEG REPORT - NS EEG TEXT BOX
Pending [facility]  Report of Routine EEG     [facility]: [address], [address] [place] [address], Phone: [phone]  [facility]: [phone] [place] [address], Phone: [phone]  Office: [address] [facility], [facility] [address], [place] [address], Phone: [phone]    Patient Name: KENDRICK SANCHEZ    Age: 34 year  : 1985  Patient ID: -, MRN #: [id number], Location: ICU 4  Referring Physician: DR CALVILLO  EEG #:     Study Date: 10/17/2019		    Technical Information:					  On Instrument: -  Placement and Labeling of Electrodes:  The EEG was performed utilizing 20 channels referential EEG connections (coronal over temporal over parasagittal montage) using all standard 10-20 electrode placements with EKG.  Recording was at a sampling rate of 256 samples per second per channel.  Edward and seizure detection algorithms were utilized.    History:  Routine study..Performed bedside  Patient awake..agitated..uncooperative  35 Y/O female presents with alcohol abuse..ETOH..agitattion..   Concern for seizure    Medication	  Ativan (Lorazepam)	    Study Interpretation:    Findings: The background was continuous, spontaneously variable and reactive. During wakefulness, the posterior dominant rhythm consisted of symmetric, well-modulated 8 Hz activity, which is lower than normal for age, with amplitude to 30 uV, that attenuated to eye opening.  Low amplitude frontal beta was noted in wakefulness.    Background Slowing:  Diffuse theta and polymorphic delta slowing.    Focal Slowing:   None were present.    Sleep Background:  Drowsiness was characterized by fragmentation, attenuation, and slowing of the background activity.    Sleep was characterized by the presence of vertex waves, symmetric spindles and K-complexes.    Other Non-Epileptiform Findings:  Diffuse excess beta activity.    Interictal Epileptiform Activity:   None were present.    Events:  Clinical events: None recorded.  Seizures: None recorded.    Activation Procedures:   Hyperventilation was not performed.    Photic stimulation was not performed.    Artifacts:  Intermittent myogenic and movement artifacts were noted.    ECG:  The heart rate on single channel ECG was predominantly between 70 and 80 BPM.    EEG Summary/Classification:  Abnormal EEG in the awake, drowsy and asleep states.  - Generalized background slowing  - Diffuse beta activity    EEG Impression/Clinical Correlate:  Normal EEG study.  No epileptic pattern or seizure seen.    Otherwise normal EEG, except for No epileptic pattern or seizure seen.    Abnormal EEG study.    Generalized background slowing is a nonspecific finding that can be seen in the setting of diffuse or multifocal structural abnormalities of the brain (including anoxic brain injury), toxic or metabolic encephalopathy, medication side effect, or infection.  Diffuse excess beta activity may be seen with medication use such as benzodiazepines or barbiturates, or with anxiety.  No evidence of seizure tendency was identified.    ________________________________________    Juanjose Calvillo MD  Attending Physician, [facility] [place] [facility]  Report of Routine EEG     [facility]: [address], [address] [place] [address], Phone: [phone]  [facility]: [phone] [place] [address], Phone: [phone]  Office: [address] [facility], [facility] [address], [place] [address], Phone: [phone]    Patient Name: KENDRICK SANCHEZ    Age: 34 year  : 1985  Patient ID: -, MRN #: [id number], Location: ICU 4  Referring Physician: DR CALVILLO  EEG #:     Study Date: 10/17/2019		    Technical Information:					  On Instrument: -  Placement and Labeling of Electrodes:  The EEG was performed utilizing 20 channels referential EEG connections (coronal over temporal over parasagittal montage) using all standard 10-20 electrode placements with EKG.  Recording was at a sampling rate of 256 samples per second per channel.  Edward and seizure detection algorithms were utilized.    History:  Routine study..Performed bedside  Patient awake..agitated..uncooperative  35 Y/O female presents with alcohol abuse..ETOH..agitattion..   Concern for seizure    Medication	  Ativan (Lorazepam)	    Study Interpretation:    Findings: The background was continuous, spontaneously variable and reactive. During wakefulness, the posterior dominant rhythm consisted of symmetric, well-modulated 8 Hz activity, which is lower than normal for age, with amplitude to 30 uV, that attenuated to eye opening.  Low amplitude frontal beta was noted in wakefulness.    Background Slowing:  Diffuse theta and polymorphic delta slowing.    Focal Slowing:   None were present.    Sleep Background:  Drowsiness was characterized by fragmentation, attenuation, and slowing of the background activity.    Sleep was characterized by the presence of vertex waves, symmetric spindles and K-complexes.    Other Non-Epileptiform Findings:  Diffuse excess beta activity.    Interictal Epileptiform Activity:   None were present.    Events:  Clinical events: None recorded.  Seizures: None recorded.    Activation Procedures:   Hyperventilation was not performed.    Photic stimulation was not performed.    Artifacts:  Intermittent myogenic and movement artifacts were noted.    ECG:  The heart rate on single channel ECG was predominantly between 70 and 80 BPM.    EEG Summary/Classification:  Abnormal EEG in the awake, drowsy and asleep states.  - Generalized background slowing  - Diffuse beta activity    EEG Impression/Clinical Correlate:    Abnormal EEG study.    Generalized background slowing is a nonspecific finding that can be seen in the setting of diffuse or multifocal structural abnormalities of the brain (including anoxic brain injury), toxic or metabolic encephalopathy, medication side effect, or infection.  Diffuse excess beta activity may be seen with medication use such as benzodiazepines or barbiturates, or with anxiety.  No evidence of seizure tendency was identified.    ________________________________________    Juanjose Calvillo MD  Attending Physician, [facility] [place]

## 2019-10-17 NOTE — PROGRESS NOTE ADULT - SUBJECTIVE AND OBJECTIVE BOX
INTERVAL HPI/OVERNIGHT EVENTS: *** Patient was seen and examined at bed side.     PRESSORS: [ ] YES [ ] NO  WHICH:    ANTIBIOTICS:                  DATE STARTED:  ANTIBIOTICS:                  DATE STARTED:  ANTIBIOTICS:                  DATE STARTED:    Antimicrobial:    Cardiovascular:    Pulmonary:    Hematalogic:  enoxaparin Injectable 40 milliGRAM(s) SubCutaneous daily    Other:  dexmedetomidine Bolus 42 MICROGram(s) IV Bolus once  dexmedetomidine Infusion 0.1 MICROgram(s)/kG/Hr IV Continuous <Continuous>  influenza   Vaccine 0.5 milliLiter(s) IntraMuscular once  LORazepam   Injectable 2 milliGRAM(s) IV Push every 4 hours  LORazepam   Injectable 2 milliGRAM(s) IV Push every 2 hours PRN  multivitamin/thiamine/folic acid in sodium chloride 0.9% 1000 milliLiter(s) IV Continuous <Continuous>  thiamine IVPB 500 milliGRAM(s) IV Intermittent three times a day    dexmedetomidine Bolus 42 MICROGram(s) IV Bolus once  dexmedetomidine Infusion 0.1 MICROgram(s)/kG/Hr IV Continuous <Continuous>  enoxaparin Injectable 40 milliGRAM(s) SubCutaneous daily  influenza   Vaccine 0.5 milliLiter(s) IntraMuscular once  LORazepam   Injectable 2 milliGRAM(s) IV Push every 4 hours  LORazepam   Injectable 2 milliGRAM(s) IV Push every 2 hours PRN  multivitamin/thiamine/folic acid in sodium chloride 0.9% 1000 milliLiter(s) IV Continuous <Continuous>  thiamine IVPB 500 milliGRAM(s) IV Intermittent three times a day    Drug Dosing Weight  Height (cm): 165.1 (16 Oct 2019 11:15)  Weight (kg): 83.9 (16 Oct 2019 11:15)  BMI (kg/m2): 30.8 (16 Oct 2019 11:15)  BSA (m2): 1.91 (16 Oct 2019 11:15)    CENTRAL LINE: [ ] YES [ ] NO  LOCATION:   DATE INSERTED:  REMOVE: [ ] YES [ ] NO  EXPLAIN:    SANCHES: [ ] YES [ ] NO    DATE INSERTED:  REMOVE:  [ ] YES [ ] NO  EXPLAIN:    A-LINE:  [ ] YES [ ] NO  LOCATION:   DATE INSERTED:  REMOVE:  [ ] YES [ ] NO  EXPLAIN:        ICU Vital Signs Last 24 Hrs  T(C): 37 (17 Oct 2019 06:55), Max: 98.1 (16 Oct 2019 19:20)  T(F): 98.6 (17 Oct 2019 06:55), Max: 208.5 (16 Oct 2019 19:20)  HR: 77 (17 Oct 2019 08:00) (63 - 110)  BP: 123/73 (17 Oct 2019 08:00) (108/62 - 140/87)  BP(mean): 86 (17 Oct 2019 08:00) (70 - 98)  ABP: --  ABP(mean): --  RR: 26 (17 Oct 2019 08:00) (16 - 26)  SpO2: 91% (17 Oct 2019 08:00) (91% - 99%)            10-16 @ 07:01  -  10-17 @ 07:00  --------------------------------------------------------  IN: 1310.8 mL / OUT: 1550 mL / NET: -239.2 mL            PHYSICAL EXAM:    GENERAL:NAD, well-groomed, well-developed  HEAD:  Atraumatic, Normocephalic  EYES: EOMI, PERRLA, conjunctiva and sclera clear  ENMT: No tonsillar erythema, exudates, or enlargement; Moist mucous membranes, Good dentition, No lesions  NECK: Supple, normal appearance, No JVD; Normal thyroid; Trachea midline  NERVOUS SYSTEM: Alert & Oriented X3, Good concentration; Motor Strength 5/5 B/L upper and lower extremities; DTRs 2+ intact and symmetric  CHEST/LUNG: No chest deformity;Normal percussion bilaterally; No rales, rhonchi, wheezing   HEART: Regular rate and rhythm; No murmurs, rubs, or gallops  ABDOMEN: Soft, Nontender, Nondistended; Bowel sounds present  EXTREMITIES: 2+ Peripheral Pulses, No clubbing, cyanosis, or edema  LYMPH: No lymphadenopathy noted  SKIN:No rashes or lesions; Good capillary refill      LABS:  CBC Full  -  ( 17 Oct 2019 06:45 )  WBC Count : 9.72 K/uL  RBC Count : 4.00 M/uL  Hemoglobin : 12.4 g/dL  Hematocrit : 38.4 %  Platelet Count - Automated : 267 K/uL  Mean Cell Volume : 96.0 fl  Mean Cell Hemoglobin : 31.0 pg  Mean Cell Hemoglobin Concentration : 32.3 gm/dL  Auto Neutrophil # : 5.97 K/uL  Auto Lymphocyte # : 2.73 K/uL  Auto Monocyte # : 0.68 K/uL  Auto Eosinophil # : 0.24 K/uL  Auto Basophil # : 0.06 K/uL  Auto Neutrophil % : 61.4 %  Auto Lymphocyte % : 28.1 %  Auto Monocyte % : 7.0 %  Auto Eosinophil % : 2.5 %  Auto Basophil % : 0.6 %    10-17    140  |  111<H>  |  7   ----------------------------<  65<L>  4.0   |  21<L>  |  0.51    Ca    7.7<L>      17 Oct 2019 06:45  Phos  2.7     10-17  Mg     1.6     10-17    TPro  5.6<L>  /  Alb  2.6<L>  /  TBili  0.5  /  DBili  x   /  AST  35  /  ALT  63<H>  /  AlkPhos  38<L>  10-17            RADIOLOGY & ADDITIONAL STUDIES REVIEWED:  ***    GOALS OF CARE DISCUSSION WITH PATIENT/FAMILY/PROXY: full code/DNR/DNI    CRITICAL CARE TIME SPENT: 35 minutes INTERVAL HPI/OVERNIGHT EVENTS: Patient was seen and examined at bed side. Patient has been on precedex overnight. On a low dose this morning. Did not require PRN doses of ativan overnight. Ativan to be discontinued today and changed to librium while precedex is tapered down. Patient to have EEG done today.    PRESSORS: No    Antimicrobial: None    Cardiovascular: None    Pulmonary: None    Hematalogic:  enoxaparin Injectable 40 milliGRAM(s) SubCutaneous daily    Other:  dexmedetomidine Bolus 42 MICROGram(s) IV Bolus once  dexmedetomidine Infusion 0.1 MICROgram(s)/kG/Hr IV Continuous <Continuous>  influenza   Vaccine 0.5 milliLiter(s) IntraMuscular once  LORazepam   Injectable 2 milliGRAM(s) IV Push every 4 hours  LORazepam   Injectable 2 milliGRAM(s) IV Push every 2 hours PRN  multivitamin/thiamine/folic acid in sodium chloride 0.9% 1000 milliLiter(s) IV Continuous <Continuous>  thiamine IVPB 500 milliGRAM(s) IV Intermittent three times a day    dexmedetomidine Bolus 42 MICROGram(s) IV Bolus once  dexmedetomidine Infusion 0.1 MICROgram(s)/kG/Hr IV Continuous <Continuous>  enoxaparin Injectable 40 milliGRAM(s) SubCutaneous daily  influenza   Vaccine 0.5 milliLiter(s) IntraMuscular once  LORazepam   Injectable 2 milliGRAM(s) IV Push every 4 hours  LORazepam   Injectable 2 milliGRAM(s) IV Push every 2 hours PRN  multivitamin/thiamine/folic acid in sodium chloride 0.9% 1000 milliLiter(s) IV Continuous <Continuous>  thiamine IVPB 500 milliGRAM(s) IV Intermittent three times a day    Drug Dosing Weight  Height (cm): 165.1 (16 Oct 2019 11:15)  Weight (kg): 83.9 (16 Oct 2019 11:15)  BMI (kg/m2): 30.8 (16 Oct 2019 11:15)  BSA (m2): 1.91 (16 Oct 2019 11:15)    CENTRAL LINE: No    SANCHES: Yes     DATE INSERTED: 10/17  REMOVE: Yes    A-LINE: No    ICU Vital Signs Last 24 Hrs  T(C): 37 (17 Oct 2019 06:55), Max: 98.1 (16 Oct 2019 19:20)  T(F): 98.6 (17 Oct 2019 06:55), Max: 208.5 (16 Oct 2019 19:20)  HR: 77 (17 Oct 2019 08:00) (63 - 110)  BP: 123/73 (17 Oct 2019 08:00) (108/62 - 140/87)  BP(mean): 86 (17 Oct 2019 08:00) (70 - 98)  RR: 26 (17 Oct 2019 08:00) (16 - 26)  SpO2: 91% (17 Oct 2019 08:00) (91% - 99%)        10-16 @ 07:01  -  10-17 @ 07:00  --------------------------------------------------------  IN: 1310.8 mL / OUT: 1550 mL / NET: -239.2 mL        PHYSICAL EXAM:  GENERAL: Patient seen resting in bed and in no acute distress; patient is sleepy this morning and minimally verbal   HEAD: Atraumatic, Normocephalic  EYES: PERRLA, conjunctiva and sclera clear  ENMT: No tonsillar erythema, exudates, or enlargement  NECK: Supple, normal appearance  NERVOUS SYSTEM: Unable to assess orientation due to sleepiness  CHEST/LUNG: No chest deformity; lung sounds clear to auscultation bilaterally; No wheezing   HEART: Regular rate and rhythm; No murmurs  ABDOMEN: Soft, Nontender, Nondistended; Bowel sounds present  EXTREMITIES: LUE shoulder tenderness to palpation; swelling in upper extremities bilaterally  LYMPH: No lymphadenopathy noted  SKIN: No rashes or lesions      LABS:  CBC Full  -  ( 17 Oct 2019 06:45 )  WBC Count : 9.72 K/uL  RBC Count : 4.00 M/uL  Hemoglobin : 12.4 g/dL  Hematocrit : 38.4 %  Platelet Count - Automated : 267 K/uL  Mean Cell Volume : 96.0 fl  Mean Cell Hemoglobin : 31.0 pg  Mean Cell Hemoglobin Concentration : 32.3 gm/dL  Auto Neutrophil # : 5.97 K/uL  Auto Lymphocyte # : 2.73 K/uL  Auto Monocyte # : 0.68 K/uL  Auto Eosinophil # : 0.24 K/uL  Auto Basophil # : 0.06 K/uL  Auto Neutrophil % : 61.4 %  Auto Lymphocyte % : 28.1 %  Auto Monocyte % : 7.0 %  Auto Eosinophil % : 2.5 %  Auto Basophil % : 0.6 %    10-17    140  |  111<H>  |  7   ----------------------------<  65<L>  4.0   |  21<L>  |  0.51    Ca    7.7<L>      17 Oct 2019 06:45  Phos  2.7     10-17  Mg     1.6     10-17    TPro  5.6<L>  /  Alb  2.6<L>  /  TBili  0.5  /  DBili  x   /  AST  35  /  ALT  63<H>  /  AlkPhos  38<L>  10-17      RADIOLOGY & ADDITIONAL STUDIES REVIEWED: < from: CT Head No Cont (10.16.19 @ 17:07) >  IMPRESSION:     No acute intracranial hemorrhage, mass effect, or evidence of acute   vascular territorial infarction.    If clinical symptoms persist or worsen, more sensitive evaluation with   brain MRI may be obtained, if no contraindications exist.    < end of copied text >    < from: Xray Shoulder 2 Views, Left (10.17.19 @ 10:34) >  Impression: No evidence for an acute fracture or dislocation.    The joint spaces are preserved.    The osseous mineralization is within normal limits.     < end of copied text >      GOALS OF CARE DISCUSSION WITH PATIENT/FAMILY/PROXY: full code     CRITICAL CARE TIME SPENT: 35 minutes

## 2019-10-17 NOTE — PROGRESS NOTE ADULT - PROBLEM SELECTOR PLAN 1
-pt comes in with alcohol intoxication  -BAL elevated  -Will start on dexmedetomidine bolus and infusion with RAAS -2   -Ativan 2mg Q4   -Ativan 2mg Q2 prn for CIWA >7   -Started banana bag  -Start high dose thiamine 500mg TID x 5 days   -keep NPO   -History of depression, Primary team to obtain home medications list when patient is more sober

## 2019-10-17 NOTE — PROGRESS NOTE ADULT - ASSESSMENT
Patient is a 35 y/o F with PMH of depression comes in with witnessed seizure by spouse. As per Spouse patient is been drinking extensively for 2 weeks. Today he found patient shaking and unresponsive. He then called EMS. En route to the hospital patient became awake started to become agitated. She received 6 mg ativan and 6mg versed in ED. History was obtained by  at bed side. She is AAO x 2 to self and place. Asking for her anti depressant medication. She then became agitated and received a dose of haldol with versed ROS are limited due to mental status secondary to sedation. Her Blood alcohol level was elevated to 409. ICU was consulted for severe alcohol intoxication. Patient is a 33 y/o F with PMH of depression comes in with witnessed seizure by spouse. As per Spouse patient is been drinking extensively for 2 weeks. En route to the hospital patient became awake started to become agitated. She received 6 mg ativan and 6mg versed in ED. Blood alcohol level was elevated to 409. ICU was consulted for severe alcohol intoxication.      Plan:    Neuro:  -Alcohol intoxication  -on precedex 0.4 this morning; will try to taper off  -ativan standing dose discontinued and changed to librium PO  -continue ativan PRN  -continue banana bag and thiamine  -new onset of seizures  -f/u EEG  -shoulder pain; x-ray negative for fracture     Cardio:   -no issues    Pulm:   -no issues; saturating well on room air    GI:   -will resume diet today    Nephro:  -discontinue bullard catheter    Heme:  -no issues    ID:  -no issues    Endo:  - No issues    Skin:  -no issues    PPX:  -lovenox for DVT prophylaxis

## 2019-10-17 NOTE — PROGRESS NOTE ADULT - SUBJECTIVE AND OBJECTIVE BOX
Patient was lethargic this morning during EEG, but currently is awake, in no distress, without agitation or tremors.  Normal neuro exam.  EEG only showed generalized slowing and beta activity, but no seizures.    Recs:  - No indication for AEDs  - Continue CIWA protocol    Results reviewed with patient and  at bedside, who expressed understanding of information.      NOTE TO BE COMPLETED - PLEASE REFER TO ABOVE ONLY AND IGNORE INFORMATION BELOW    Neurology Follow up note    Name  KENDRICK SANCHEZ    HPI:  Patient is a 35 y/o F with PMH of depression comes in with witnessed seizure by spouse. As per Spouse patient is been drinking extensively for 2 weeks. Today he found patient shaking and unresponsive. He then called EMS. En route to the hospital patient became awake started to become agitated. She received 6 mg ativan and 6mg versed in ED. History was obtained by  at bed side. She is AAO x 2 to self and place. Asking for her anti depressant medication. She then became agitated and received a dose of haldol with versed ROS are limited due to mental status secondary to sedation. Her Blood alcohol level was elevated to 409. ICU was consulted for severe alcohol intoxication. (16 Oct 2019 18:58)      Interval History -        Subjective:        MEDICATIONS  (STANDING):  chlordiazePOXIDE 50 milliGRAM(s) Oral every 6 hours  dexmedetomidine Bolus 42 MICROGram(s) IV Bolus once  dexmedetomidine Infusion 0.1 MICROgram(s)/kG/Hr (2.098 mL/Hr) IV Continuous <Continuous>  enoxaparin Injectable 40 milliGRAM(s) SubCutaneous daily  influenza   Vaccine 0.5 milliLiter(s) IntraMuscular once  multivitamin/thiamine/folic acid in sodium chloride 0.9% 1000 milliLiter(s) (125 mL/Hr) IV Continuous <Continuous>  nicotine - 21 mG/24Hr(s) Patch 1 patch Transdermal daily  thiamine IVPB 500 milliGRAM(s) IV Intermittent three times a day    MEDICATIONS  (PRN):  LORazepam   Injectable 2 milliGRAM(s) IV Push every 2 hours PRN Agitation  melatonin 3 milliGRAM(s) Oral at bedtime PRN Insomnia  ondansetron Injectable 4 milliGRAM(s) IV Push every 6 hours PRN Nausea and/or Vomiting      Allergies    No Known Allergies    Intolerances        Review of Systems:  General: [ ] None, [ ] chills, [ ]fatigue, [ ] fevers  Skin: [ ] None, [ ] rash   HEENT: [ ] None, [ ] head injury, [ ] blurred vision, [ ] double vision, [ ] eye pain, [ ] visual loss, [ ] hearing loss, [ ] deafness, [ ] ear pain, [ ] ringing in the ears, [ ] vertigo, [ ] sinus pain, [ ] voice changes  Neck: [ ] None, [ ] neck stiffness  Respiratory: [ ] None, [ ] cough, [ ] difficulty breathing  Cardiovascular: [ ] None, [ ] calf cramps, [ ] chest pain, [ ] leg pain, [ ] swelling, [ ] rapid heart rate, [ ] shortness of breath  Gastrointestinal: [ ] None, [ ] abdominal pain, [ ] nausea, [ ] vomiting  Musculoskeletal: [ ] None, [ ] back pain, [ ] joint pain, [ ] joint stiffness, [ ] leg cramps, [ ] muscle atrophy, [ ] muscle cramps, [ ] muscle weakness, [ ] swelling of extremities  Neurological: [ ] None, [ ] Dizziness, [ ] decreased memory, [ ] fainting, [ ] focal neurological symptoms, [ ] headaches, [ ] incontinence of stool, [ ] incontinence of urine, [ ] loss of consciousness, [ ] numbness, [ ] seizures, [ ] spinning sensation, [ ] stroke, [ ] trouble walking, [ ] unsteadiness, [ ] visual changes, [ ] weakness  Psychiatric: [ ] None,  [ ] depression, [ ] anxiety, [ ] hallucinations, [ ] inability to concentrate, [ ] mood changes, [ ] panic attacks  Hematology: [ ] None,  [ ] blood clots, [ ] spontaneous bleeding      Objective:   Vital Signs Last 24 Hrs  T(C): 37.4 (17 Oct 2019 15:18), Max: 37.4 (17 Oct 2019 15:18)  T(F): 99.3 (17 Oct 2019 15:18), Max: 99.3 (17 Oct 2019 15:18)  HR: 87 (17 Oct 2019 20:00) (63 - 96)  BP: 128/73 (17 Oct 2019 20:00) (108/62 - 142/81)  BP(mean): 86 (17 Oct 2019 20:00) (70 - 97)  RR: 20 (17 Oct 2019 20:00) (15 - 29)  SpO2: 99% (17 Oct 2019 20:00) (91% - 100%)    General Exam:   General appearance: No acute distress                 Cardiovascular: Pedal dorsalis pulses intact bilaterally    Neurological Exam:  Mental Status: Orientated to self, date and place.  Attention intact.  No dysarthria, aphasia or neglect.  Knowledge intact.  Registration intact.  Short and long term memory grossly intact.      Cranial Nerves: CN I - not tested.  PERRL, EOMI, VFF, no nystagmus or diplopia.  No APD.  Fundi not visualized bilaterally.  CN V1-3 intact to light touch and pinprick.  No facial asymmetry.  Hearing intact to finger rub bilaterally.  Tongue, uvula and palate midline.  Sternocleidomastoid and Trapezius intact bilaterally.    Motor:   Tone: normal.                  Strength: intact throughout  Pronator drift: none                 Dysmeria: None to finger-nose-finger or heel-shin-heel  No truncal ataxia.    Tremor: No resting, postural or action tremor.  No myoclonus.    Sensation: intact to light touch, pinprick, vibration and proprioception    Deep Tendon Reflexes: 1+ bilateral biceps, triceps, brachioradialis, knee and ankle  Toes flexor bilaterally    Gait: normal and stable.      Other:    10-17    140  |  111<H>  |  7   ----------------------------<  65<L>  4.0   |  21<L>  |  0.51    Ca    7.7<L>      17 Oct 2019 06:45  Phos  2.7     10-17  Mg     1.6     10-17    TPro  5.6<L>  /  Alb  2.6<L>  /  TBili  0.5  /  DBili  x   /  AST  35  /  ALT  63<H>  /  AlkPhos  38<L>  10-17    10-17    140  |  111<H>  |  7   ----------------------------<  65<L>  4.0   |  21<L>  |  0.51    Ca    7.7<L>      17 Oct 2019 06:45  Phos  2.7     10-17  Mg     1.6     10-17    TPro  5.6<L>  /  Alb  2.6<L>  /  TBili  0.5  /  DBili  x   /  AST  35  /  ALT  63<H>  /  AlkPhos  38<L>  10-17    LIVER FUNCTIONS - ( 17 Oct 2019 06:45 )  Alb: 2.6 g/dL / Pro: 5.6 g/dL / ALK PHOS: 38 U/L / ALT: 63 U/L DA / AST: 35 U/L / GGT: x             Radiology    EKG:  tele:  TTE:  EEG:                 Please contact the Neurology consult service with any questions.    Juanjose Dukes MD  Neurology Attending  [facility] Neurology Follow up note    Name  KENDRICK SANCHEZ    HPI:  Patient is a 33 y/o F with PMH of depression comes in with witnessed seizure by spouse. As per Spouse patient is been drinking extensively for 2 weeks. Today he found patient shaking and unresponsive. He then called EMS. En route to the hospital patient became awake started to become agitated. She received 6 mg ativan and 6mg versed in ED. History was obtained by  at bed side. She is AAO x 2 to self and place. Asking for her anti depressant medication. She then became agitated and received a dose of haldol with versed ROS are limited due to mental status secondary to sedation. Her Blood alcohol level was elevated to 409. ICU was consulted for severe alcohol intoxication. (16 Oct 2019 18:58)    NEURO HPI:  34F patient was observed by  to have generalized body shaking, and was found to have elevated ethanol level. She had been agitated, and received lorazepam and midazolam.    Interval History -  Patient was lethargic this morning during EEG, but currently is awake, in no distress, without agitation or tremors.    MEDICATIONS  (STANDING):  chlordiazePOXIDE 50 milliGRAM(s) Oral every 6 hours  dexmedetomidine Bolus 42 MICROGram(s) IV Bolus once  dexmedetomidine Infusion 0.1 MICROgram(s)/kG/Hr (2.098 mL/Hr) IV Continuous <Continuous>  enoxaparin Injectable 40 milliGRAM(s) SubCutaneous daily  influenza   Vaccine 0.5 milliLiter(s) IntraMuscular once  multivitamin/thiamine/folic acid in sodium chloride 0.9% 1000 milliLiter(s) (125 mL/Hr) IV Continuous <Continuous>  nicotine - 21 mG/24Hr(s) Patch 1 patch Transdermal daily  thiamine IVPB 500 milliGRAM(s) IV Intermittent three times a day    MEDICATIONS  (PRN):  LORazepam   Injectable 2 milliGRAM(s) IV Push every 2 hours PRN Agitation  melatonin 3 milliGRAM(s) Oral at bedtime PRN Insomnia  ondansetron Injectable 4 milliGRAM(s) IV Push every 6 hours PRN Nausea and/or Vomiting    Allergies  No Known Allergies    Review of Systems: Fourteen systems reviewed and negative except as in HPI / Interval History.      Objective:   Vital Signs Last 24 Hrs  T(C): 37.4 (17 Oct 2019 15:18), Max: 37.4 (17 Oct 2019 15:18)  T(F): 99.3 (17 Oct 2019 15:18), Max: 99.3 (17 Oct 2019 15:18)  HR: 87 (17 Oct 2019 20:00) (63 - 96)  BP: 128/73 (17 Oct 2019 20:00) (108/62 - 142/81)  BP(mean): 86 (17 Oct 2019 20:00) (70 - 97)  RR: 20 (17 Oct 2019 20:00) (15 - 29)  SpO2: 99% (17 Oct 2019 20:00) (91% - 100%)    General Exam:  General: No acute distress  Respiratory: CTAB/l.  No crackles, rhonchi, or wheezes.  Cardiovascular: RRR, No murmurs, Full b/l radial and pedal pulses    Neurological Exam:  General / Mental Status: Oriented to person, place, and time.  No dysarthria or aphasia present.  Naming and repetition intact.  Cranial Nerves: PERRLA, EOMI x 2, VFF x 4, No nystagmus or diplopia.  B/l V1-V3 equal to light touch and pinprick.  Symmetric facial movement and palate elevation.  B/l hearing equal to finger rub.  5/5 strength with b/l sternocleidomastoid and trapezius.  Midline tongue protrusion with no atrophy or fasciculations.  Motor: Normal bulk and tone in all four extremities.  5/5 strength throughout all four extremities.  No downward drift, rigidity, spasticity, or tremors in any of the four extremities.  Sensation: Intact to light touch and pinprick in all four extremities.  Coordination: No dysmetria with b/l finger-to-nose and heel raise tests.  Reflexes: 1+ and symmetric at b/l biceps, triceps, brachioradialis, patellae, and ankles.  Toes flexor b/l.  Gait and Romberg testing deferred per patient request.      Labs:    10-17    140  |  111<H>  |  7   ----------------------------<  65<L>  4.0   |  21<L>  |  0.51    Ca    7.7<L>      17 Oct 2019 06:45  Phos  2.7     10-17  Mg     1.6     10-17    TPro  5.6<L>  /  Alb  2.6<L>  /  TBili  0.5  /  DBili  x   /  AST  35  /  ALT  63<H>  /  AlkPhos  38<L>  10-17      Neuroimaging:    CT Head (10/16/19): No acute intracranial abnormality    Routine EEG (10/17/19):  Abnormal EEG in the awake, drowsy and asleep states.  - Generalized background slowing  - Diffuse beta activity      Assessment:  34F with likely withdrawal seizure in the setting of heavy alcohol use      Recommendations:    - No indication to start antiepileptic medications given absence of seizure tendency on routine EEG approved to evaluate for seizure tendency vs. subclinical seizures    - Continue [facility] protocol for alcohol withdrawal      Please contact the Neurology consult service with any questions.    Juanjose Dukes MD  Neurology Attending  [facility]

## 2019-10-18 LAB
ANION GAP SERPL CALC-SCNC: 6 MMOL/L — SIGNIFICANT CHANGE UP (ref 5–17)
BUN SERPL-MCNC: 9 MG/DL — SIGNIFICANT CHANGE UP (ref 7–18)
CALCIUM SERPL-MCNC: 8.9 MG/DL — SIGNIFICANT CHANGE UP (ref 8.4–10.5)
CHLORIDE SERPL-SCNC: 107 MMOL/L — SIGNIFICANT CHANGE UP (ref 96–108)
CO2 SERPL-SCNC: 25 MMOL/L — SIGNIFICANT CHANGE UP (ref 22–31)
CREAT SERPL-MCNC: 0.55 MG/DL — SIGNIFICANT CHANGE UP (ref 0.5–1.3)
GLUCOSE SERPL-MCNC: 84 MG/DL — SIGNIFICANT CHANGE UP (ref 70–99)
HCT VFR BLD CALC: 40.7 % — SIGNIFICANT CHANGE UP (ref 34.5–45)
HGB BLD-MCNC: 13.7 G/DL — SIGNIFICANT CHANGE UP (ref 11.5–15.5)
MAGNESIUM SERPL-MCNC: 1.8 MG/DL — SIGNIFICANT CHANGE UP (ref 1.6–2.6)
MCHC RBC-ENTMCNC: 31.4 PG — SIGNIFICANT CHANGE UP (ref 27–34)
MCHC RBC-ENTMCNC: 33.7 GM/DL — SIGNIFICANT CHANGE UP (ref 32–36)
MCV RBC AUTO: 93.1 FL — SIGNIFICANT CHANGE UP (ref 80–100)
NRBC # BLD: 0 /100 WBCS — SIGNIFICANT CHANGE UP (ref 0–0)
PHOSPHATE SERPL-MCNC: 4.1 MG/DL — SIGNIFICANT CHANGE UP (ref 2.5–4.5)
PLATELET # BLD AUTO: 245 K/UL — SIGNIFICANT CHANGE UP (ref 150–400)
POTASSIUM SERPL-MCNC: 4 MMOL/L — SIGNIFICANT CHANGE UP (ref 3.5–5.3)
POTASSIUM SERPL-SCNC: 4 MMOL/L — SIGNIFICANT CHANGE UP (ref 3.5–5.3)
RBC # BLD: 4.37 M/UL — SIGNIFICANT CHANGE UP (ref 3.8–5.2)
RBC # FLD: 12.9 % — SIGNIFICANT CHANGE UP (ref 10.3–14.5)
SODIUM SERPL-SCNC: 138 MMOL/L — SIGNIFICANT CHANGE UP (ref 135–145)
WBC # BLD: 8.45 K/UL — SIGNIFICANT CHANGE UP (ref 3.8–10.5)
WBC # FLD AUTO: 8.45 K/UL — SIGNIFICANT CHANGE UP (ref 3.8–10.5)

## 2019-10-18 RX ORDER — CHLORHEXIDINE GLUCONATE 213 G/1000ML
1 SOLUTION TOPICAL
Refills: 0 | Status: DISCONTINUED | OUTPATIENT
Start: 2019-10-18 | End: 2019-10-19

## 2019-10-18 RX ORDER — CITALOPRAM 10 MG/1
20 TABLET, FILM COATED ORAL DAILY
Refills: 0 | Status: DISCONTINUED | OUTPATIENT
Start: 2019-10-18 | End: 2019-10-19

## 2019-10-18 RX ADMIN — Medication 50 MILLIGRAM(S): at 22:16

## 2019-10-18 RX ADMIN — Medication 1 PATCH: at 10:32

## 2019-10-18 RX ADMIN — Medication 2 MILLIGRAM(S): at 10:13

## 2019-10-18 RX ADMIN — Medication 50 MILLIGRAM(S): at 01:07

## 2019-10-18 RX ADMIN — Medication 50 MILLIGRAM(S): at 11:17

## 2019-10-18 RX ADMIN — CITALOPRAM 20 MILLIGRAM(S): 10 TABLET, FILM COATED ORAL at 15:47

## 2019-10-18 RX ADMIN — Medication 105 MILLIGRAM(S): at 05:31

## 2019-10-18 RX ADMIN — Medication 50 MILLIGRAM(S): at 05:30

## 2019-10-18 RX ADMIN — SODIUM CHLORIDE 125 MILLILITER(S): 9 INJECTION, SOLUTION INTRAVENOUS at 13:31

## 2019-10-18 RX ADMIN — Medication 2 MILLIGRAM(S): at 01:12

## 2019-10-18 RX ADMIN — CHLORHEXIDINE GLUCONATE 1 APPLICATION(S): 213 SOLUTION TOPICAL at 05:12

## 2019-10-18 RX ADMIN — Medication 105 MILLIGRAM(S): at 13:31

## 2019-10-18 RX ADMIN — Medication 1 PATCH: at 05:12

## 2019-10-18 RX ADMIN — Medication 1 PATCH: at 11:17

## 2019-10-18 RX ADMIN — Medication 2 MILLIGRAM(S): at 05:30

## 2019-10-18 RX ADMIN — ENOXAPARIN SODIUM 40 MILLIGRAM(S): 100 INJECTION SUBCUTANEOUS at 11:17

## 2019-10-18 RX ADMIN — Medication 105 MILLIGRAM(S): at 22:20

## 2019-10-18 NOTE — PROGRESS NOTE ADULT - ASSESSMENT
Patient is a 33 y/o F with PMH of depression comes in with witnessed seizure by spouse. As per Spouse patient is been drinking extensively for 2 weeks. En route to the hospital patient became awake started to become agitated. She received 6 mg ativan and 6mg versed in ED. Blood alcohol level was elevated to 409. ICU was consulted for severe alcohol intoxication.      Plan:    Neuro:  -Alcohol intoxication  -off of precedex since 10/17  -ativan standing dose discontinued and changed to librium PO  -will do librium PO taper   -continue ativan PRN  -continue banana bag and thiamine  -EEG negative for seizure activity; most likely was due to alcohol withdrawal   -shoulder pain; x-ray negative for fracture     Cardio:   -no issues    Pulm:   -no issues; saturating well on room air    GI:   -will resume diet today    Nephro:  -discontinue bullard catheter    Heme:  -no issues    ID:  -no issues    Endo:  - No issues    Skin:  -no issues    PPX:  -lovenox for DVT prophylaxis Patient is a 33 y/o F with PMH of depression comes in with witnessed seizure by spouse. As per Spouse patient is been drinking extensively for 2 weeks. En route to the hospital patient became awake started to become agitated. She received 6 mg ativan and 6mg versed in ED. Blood alcohol level was elevated to 409. ICU was consulted for severe alcohol intoxication.      Plan:    Neuro:  -Alcohol intoxication  -off of precedex since 10/17  -ativan standing dose discontinued and changed to librium PO  -will do librium PO taper   -continue ativan PRN  -continue banana bag and thiamine  -EEG negative for seizure activity; most likely was due to alcohol withdrawal   -shoulder pain; x-ray negative for fracture     Cardio:   -no issues    Pulm:   -no issues; saturating well on room air    GI:   -continue diet    -no issues    Nephro:  -bullard removed 10/17  -patient is urinating with no issues    Heme:  -no issues    ID:  -no issues    Endo:  - No issues    Skin:  -no issues    PPX:  -lovenox for DVT prophylaxis Patient is a 33 y/o F with PMH of depression comes in with witnessed seizure by spouse. As per Spouse patient is been drinking extensively for 2 weeks. En route to the hospital patient became awake started to become agitated. She received 6 mg ativan and 6mg versed in ED. Blood alcohol level was elevated to 409. ICU was consulted for severe alcohol intoxication.      Plan:    Neuro:  -Alcohol intoxication  -off of precedex since 10/17  -ativan standing dose discontinued and changed to librium PO  -will do librium PO taper   -continue ativan PRN  -continue banana bag and thiamine  -EEG negative for seizure activity; most likely was due to alcohol withdrawal   -shoulder pain; x-ray negative for fracture   -psych consulted for PTSD/depression    Cardio:   -no issues    Pulm:   -no issues; saturating well on room air    GI:   -continue diet    -no issues    Nephro:  -bullard removed 10/17  -patient is urinating with no issues    Heme:  -no issues    ID:  -no issues    Endo:  - No issues    Skin:  -no issues    PPX:  -lovenox for DVT prophylaxis

## 2019-10-18 NOTE — DIETITIAN INITIAL EVALUATION ADULT. - PERTINENT MEDS FT
MEDICATIONS  (STANDING):  chlordiazePOXIDE 50 milliGRAM(s) Oral every 8 hours  chlorhexidine 2% Cloths 1 Application(s) Topical <User Schedule>  citalopram 20 milliGRAM(s) Oral daily  enoxaparin Injectable 40 milliGRAM(s) SubCutaneous daily  influenza   Vaccine 0.5 milliLiter(s) IntraMuscular once  multivitamin/thiamine/folic acid in sodium chloride 0.9% 1000 milliLiter(s) (125 mL/Hr) IV Continuous <Continuous>  nicotine - 21 mG/24Hr(s) Patch 1 patch Transdermal daily  thiamine IVPB 500 milliGRAM(s) IV Intermittent three times a day    MEDICATIONS  (PRN):  LORazepam   Injectable 2 milliGRAM(s) IV Push every 2 hours PRN Agitation  melatonin 3 milliGRAM(s) Oral at bedtime PRN Insomnia  ondansetron Injectable 4 milliGRAM(s) IV Push every 6 hours PRN Nausea and/or Vomiting

## 2019-10-18 NOTE — PROGRESS NOTE ADULT - SUBJECTIVE AND OBJECTIVE BOX
INTERVAL HPI/OVERNIGHT EVENTS: *** Patient was seen and examined at bed side.     PRESSORS: [ ] YES [ ] NO  WHICH:    ANTIBIOTICS:                  DATE STARTED:  ANTIBIOTICS:                  DATE STARTED:  ANTIBIOTICS:                  DATE STARTED:    Antimicrobial:    Cardiovascular:    Pulmonary:    Hematalogic:  enoxaparin Injectable 40 milliGRAM(s) SubCutaneous daily    Other:  chlordiazePOXIDE 50 milliGRAM(s) Oral every 6 hours  chlorhexidine 2% Cloths 1 Application(s) Topical <User Schedule>  dexmedetomidine Bolus 42 MICROGram(s) IV Bolus once  dexmedetomidine Infusion 0.1 MICROgram(s)/kG/Hr IV Continuous <Continuous>  influenza   Vaccine 0.5 milliLiter(s) IntraMuscular once  LORazepam   Injectable 2 milliGRAM(s) IV Push every 2 hours PRN  melatonin 3 milliGRAM(s) Oral at bedtime PRN  multivitamin/thiamine/folic acid in sodium chloride 0.9% 1000 milliLiter(s) IV Continuous <Continuous>  nicotine - 21 mG/24Hr(s) Patch 1 patch Transdermal daily  ondansetron Injectable 4 milliGRAM(s) IV Push every 6 hours PRN  thiamine IVPB 500 milliGRAM(s) IV Intermittent three times a day    chlordiazePOXIDE 50 milliGRAM(s) Oral every 6 hours  chlorhexidine 2% Cloths 1 Application(s) Topical <User Schedule>  dexmedetomidine Bolus 42 MICROGram(s) IV Bolus once  dexmedetomidine Infusion 0.1 MICROgram(s)/kG/Hr IV Continuous <Continuous>  enoxaparin Injectable 40 milliGRAM(s) SubCutaneous daily  influenza   Vaccine 0.5 milliLiter(s) IntraMuscular once  LORazepam   Injectable 2 milliGRAM(s) IV Push every 2 hours PRN  melatonin 3 milliGRAM(s) Oral at bedtime PRN  multivitamin/thiamine/folic acid in sodium chloride 0.9% 1000 milliLiter(s) IV Continuous <Continuous>  nicotine - 21 mG/24Hr(s) Patch 1 patch Transdermal daily  ondansetron Injectable 4 milliGRAM(s) IV Push every 6 hours PRN  thiamine IVPB 500 milliGRAM(s) IV Intermittent three times a day    Drug Dosing Weight  Height (cm): 165.1 (16 Oct 2019 11:15)  Weight (kg): 83.9 (16 Oct 2019 11:15)  BMI (kg/m2): 30.8 (16 Oct 2019 11:15)  BSA (m2): 1.91 (16 Oct 2019 11:15)    CENTRAL LINE: [ ] YES [ ] NO  LOCATION:   DATE INSERTED:  REMOVE: [ ] YES [ ] NO  EXPLAIN:    SANCHES: [ ] YES [ ] NO    DATE INSERTED:  REMOVE:  [ ] YES [ ] NO  EXPLAIN:    A-LINE:  [ ] YES [ ] NO  LOCATION:   DATE INSERTED:  REMOVE:  [ ] YES [ ] NO  EXPLAIN:        ICU Vital Signs Last 24 Hrs  T(C): 36.7 (18 Oct 2019 04:21), Max: 37.4 (17 Oct 2019 15:18)  T(F): 98.1 (18 Oct 2019 04:21), Max: 99.3 (17 Oct 2019 15:18)  HR: 76 (18 Oct 2019 07:00) (66 - 95)  BP: 137/99 (18 Oct 2019 07:00) (111/62 - 142/81)  BP(mean): 109 (18 Oct 2019 07:00) (72 - 109)  ABP: --  ABP(mean): --  RR: 21 (18 Oct 2019 07:00) (15 - 32)  SpO2: 97% (18 Oct 2019 07:00) (80% - 100%)            10-17 @ 07:01  -  10-18 @ 07:00  --------------------------------------------------------  IN: 2024.7 mL / OUT: 825 mL / NET: 1199.7 mL            PHYSICAL EXAM:    GENERAL:NAD, well-groomed, well-developed  HEAD:  Atraumatic, Normocephalic  EYES: EOMI, PERRLA, conjunctiva and sclera clear  ENMT: No tonsillar erythema, exudates, or enlargement; Moist mucous membranes, Good dentition, No lesions  NECK: Supple, normal appearance, No JVD; Normal thyroid; Trachea midline  NERVOUS SYSTEM: Alert & Oriented X3, Good concentration; Motor Strength 5/5 B/L upper and lower extremities; DTRs 2+ intact and symmetric  CHEST/LUNG: No chest deformity;Normal percussion bilaterally; No rales, rhonchi, wheezing   HEART: Regular rate and rhythm; No murmurs, rubs, or gallops  ABDOMEN: Soft, Nontender, Nondistended; Bowel sounds present  EXTREMITIES: 2+ Peripheral Pulses, No clubbing, cyanosis, or edema  LYMPH: No lymphadenopathy noted  SKIN:No rashes or lesions; Good capillary refill      LABS:  CBC Full  -  ( 18 Oct 2019 04:04 )  WBC Count : 8.45 K/uL  RBC Count : 4.37 M/uL  Hemoglobin : 13.7 g/dL  Hematocrit : 40.7 %  Platelet Count - Automated : 245 K/uL  Mean Cell Volume : 93.1 fl  Mean Cell Hemoglobin : 31.4 pg  Mean Cell Hemoglobin Concentration : 33.7 gm/dL  Auto Neutrophil # : x  Auto Lymphocyte # : x  Auto Monocyte # : x  Auto Eosinophil # : x  Auto Basophil # : x  Auto Neutrophil % : x  Auto Lymphocyte % : x  Auto Monocyte % : x  Auto Eosinophil % : x  Auto Basophil % : x    10-18    138  |  107  |  9   ----------------------------<  84  4.0   |  25  |  0.55    Ca    8.9      18 Oct 2019 04:04  Phos  4.1     10-18  Mg     1.8     10-18    TPro  5.6<L>  /  Alb  2.6<L>  /  TBili  0.5  /  DBili  x   /  AST  35  /  ALT  63<H>  /  AlkPhos  38<L>  10-17            RADIOLOGY & ADDITIONAL STUDIES REVIEWED:  ***    GOALS OF CARE DISCUSSION WITH PATIENT/FAMILY/PROXY: full code/DNR/DNI    CRITICAL CARE TIME SPENT: 35 minutes INTERVAL HPI/OVERNIGHT EVENTS: Patient was seen and examined at bed side. Patient has been off of precedex since yesterday morning. Patient needed 3 doses of PRN ativan overnight. No other acute events. EEG done yesterday was negative for any signs of seizures. Patient is for downgrade to medicine.      PRESSORS: No    Antimicrobial: None    Cardiovascular: None    Pulmonary: None    Hematalogic:  enoxaparin Injectable 40 milliGRAM(s) SubCutaneous daily    Other:  chlordiazePOXIDE 50 milliGRAM(s) Oral every 6 hours  chlorhexidine 2% Cloths 1 Application(s) Topical <User Schedule>  dexmedetomidine Bolus 42 MICROGram(s) IV Bolus once  dexmedetomidine Infusion 0.1 MICROgram(s)/kG/Hr IV Continuous <Continuous>  influenza   Vaccine 0.5 milliLiter(s) IntraMuscular once  LORazepam   Injectable 2 milliGRAM(s) IV Push every 2 hours PRN  melatonin 3 milliGRAM(s) Oral at bedtime PRN  multivitamin/thiamine/folic acid in sodium chloride 0.9% 1000 milliLiter(s) IV Continuous <Continuous>  nicotine - 21 mG/24Hr(s) Patch 1 patch Transdermal daily  ondansetron Injectable 4 milliGRAM(s) IV Push every 6 hours PRN  thiamine IVPB 500 milliGRAM(s) IV Intermittent three times a day    chlordiazePOXIDE 50 milliGRAM(s) Oral every 6 hours  chlorhexidine 2% Cloths 1 Application(s) Topical <User Schedule>  dexmedetomidine Bolus 42 MICROGram(s) IV Bolus once  dexmedetomidine Infusion 0.1 MICROgram(s)/kG/Hr IV Continuous <Continuous>  enoxaparin Injectable 40 milliGRAM(s) SubCutaneous daily  influenza   Vaccine 0.5 milliLiter(s) IntraMuscular once  LORazepam   Injectable 2 milliGRAM(s) IV Push every 2 hours PRN  melatonin 3 milliGRAM(s) Oral at bedtime PRN  multivitamin/thiamine/folic acid in sodium chloride 0.9% 1000 milliLiter(s) IV Continuous <Continuous>  nicotine - 21 mG/24Hr(s) Patch 1 patch Transdermal daily  ondansetron Injectable 4 milliGRAM(s) IV Push every 6 hours PRN  thiamine IVPB 500 milliGRAM(s) IV Intermittent three times a day    Drug Dosing Weight  Height (cm): 165.1 (16 Oct 2019 11:15)  Weight (kg): 83.9 (16 Oct 2019 11:15)  BMI (kg/m2): 30.8 (16 Oct 2019 11:15)  BSA (m2): 1.91 (16 Oct 2019 11:15)    CENTRAL LINE: No    SANCHES: No    A-LINE: No    ICU Vital Signs Last 24 Hrs  T(C): 36.7 (18 Oct 2019 04:21), Max: 37.4 (17 Oct 2019 15:18)  T(F): 98.1 (18 Oct 2019 04:21), Max: 99.3 (17 Oct 2019 15:18)  HR: 76 (18 Oct 2019 07:00) (66 - 95)  BP: 137/99 (18 Oct 2019 07:00) (111/62 - 142/81)  BP(mean): 109 (18 Oct 2019 07:00) (72 - 109)  RR: 21 (18 Oct 2019 07:00) (15 - 32)  SpO2: 97% (18 Oct 2019 07:00) (80% - 100%)      10-17 @ 07:01  -  10-18 @ 07:00  --------------------------------------------------------  IN: 2024.7 mL / OUT: 825 mL / NET: 1199.7 mL        PHYSICAL EXAM:  GENERAL: Patient seen resting in bed and in no apparent distress  HEAD: Atraumatic, Normocephalic  EYES: PERRLA, conjunctiva and sclera clear  ENMT: No tonsillar erythema, exudates, or enlargement  NECK: Supple, normal appearance  NERVOUS SYSTEM: Alert & Oriented X3, Good concentration; mild tenderness to palpation of LUE  CHEST/LUNG: No chest deformity; Lung sounds clear to auscultation bilaterally; no wheezing   HEART: Regular rate and rhythm; No murmurs  ABDOMEN: Soft, Nontender, Nondistended; Bowel sounds present  EXTREMITIES: No cyanosis, mild bilateral upper extremity edema  LYMPH: No lymphadenopathy noted  SKIN: No rashes or lesions      LABS:  CBC Full  -  ( 18 Oct 2019 04:04 )  WBC Count : 8.45 K/uL  RBC Count : 4.37 M/uL  Hemoglobin : 13.7 g/dL  Hematocrit : 40.7 %  Platelet Count - Automated : 245 K/uL  Mean Cell Volume : 93.1 fl  Mean Cell Hemoglobin : 31.4 pg  Mean Cell Hemoglobin Concentration : 33.7 gm/dL  Auto Neutrophil # : x  Auto Lymphocyte # : x  Auto Monocyte # : x  Auto Eosinophil # : x  Auto Basophil # : x  Auto Neutrophil % : x  Auto Lymphocyte % : x  Auto Monocyte % : x  Auto Eosinophil % : x  Auto Basophil % : x    10-18    138  |  107  |  9   ----------------------------<  84  4.0   |  25  |  0.55    Ca    8.9      18 Oct 2019 04:04  Phos  4.1     10-18  Mg     1.8     10-18    TPro  5.6<L>  /  Alb  2.6<L>  /  TBili  0.5  /  DBili  x   /  AST  35  /  ALT  63<H>  /  AlkPhos  38<L>  10-17      RADIOLOGY & ADDITIONAL STUDIES REVIEWED: < from: Xray Shoulder 2 Views, Left (10.17.19 @ 10:34) >  Impression: No evidence for an acute fracture or dislocation.    The joint spaces are preserved.    The osseous mineralization is within normal limits.     < end of copied text >    GOALS OF CARE DISCUSSION WITH PATIENT/FAMILY/PROXY: full code     CRITICAL CARE TIME SPENT: 35 minutes

## 2019-10-18 NOTE — PROGRESS NOTE ADULT - ATTENDING COMMENTS
I counseled the patient and her  at bedside about the EEG results and likely diagnosis of alcohol withdrawal seizures. They expressed understanding of information.
Patient seen and examined with resident, Addendum to above.    35 y/o female with history of Alcohol abuse, presented with intoxication. Admitted to the ICU for agitation and difficulty controlling, requiring Precedex infusion. Reported with seizures at home. Awake and interactive. This morning required PRN break through Ativan.     Assessment:  1. Alcohol intoxication  2. Seizure   3. Metabolic encephalopathy     - Monitor for signs of withdrawal  - Cont Librium titrating protocol  - Prn ativan for break through symptoms  - Thiamine and folate   - Seizures likely related to alcohol toxicity   - EEG being performed, f/u results  - Psychiatry consult   - Oral diet  - Seizure precautions  - Transfer out of ICU today
Patient seen and examined with resident, Addendum to above.    35 y/o female with history of Alcohol abuse, presented with intoxication. Admitted to the ICU for agitation and difficulty controlling, requiring Precedex infusion. Reported with seizures at home. This morning appears to be calm on low dose precedex and ativan RTC. Awake and interactive.     Assessment:  1. Alcohol intoxication  2. Seizure   3. Metabolic encephalopathy     - Monitor for signs of withdrawal  - Titrate off precedex  - Transition to Librium titrating protocol  - Prn ativan for break through symptoms  - Thiamine and folate   - Seizures likely related to alcohol toxicity   - EEG being performed, f/u results  - Oral diet  - Seizure precautions

## 2019-10-18 NOTE — CHART NOTE - NSCHARTNOTEFT_GEN_A_CORE
Patient is a 33 y/o F, with PMH of depression, who comes in with witnessed seizure by spouse. As per spouse, patient has been drinking extensively for 2 weeks. On day of admission, he found patient shaking and unresponsive. He then called EMS. En route to the hospital, patient became awake and started to become agitated. She received 6 mg ativan and 6mg versed in ED. History was obtained by  at bedside. She is AAO x2 to self and place. She then became agitated and received a dose of haldol with versed. Her blood alcohol level was elevated to 409. ICU was consulted for severe alcohol intoxication.    CT head was negative for any acute changes.     ICU course:  Patient was started on precedex for agitation. Treated with ativan 2mg q4 and ativan 2mg q2 PRN. Treated with banana bag, high dose thiamine, multivitamin, and folic acid.     Neurology, Dr. Dukes was consulted for seizure noted at home. EEG was negative for any seizure activity. Not recommended to start any anti-epileptic drugs.    Patient was taken off of precedex drip and standing dose of ativan was discontinued. Patient was changed to PO librium and was started on a taper regimen. PRN ativan was kept on if needed for agitation.    Patient also stated having left shoulder pain. X-ray done showed no signs of fracture or acute changes.     Patient stated she used to take Celexa for PTSD and depression but hasn't taken in about 6 months. Patient believes that the celexa would benefit her and help her prevent alcohol abuse. Spoke with psychiatrist, Dr. Lion, who recommended to start her on Celexa 20mg daily and have her see a psychiatrisy upon discharge.    Patient is medically and clinically stable for downgrade to the medical floor.    Things to follow:  -continue librium taper and monitor patient for alcohol withdrawal symptoms  -continue celexa and have patient follow up with psychiatrist as outpatient    Resident on call made aware of the downgrade. Attending Dr. Enriquez made aware of the downgrade. Patient is a 35 y/o F, with PMH of depression, who comes in with witnessed seizure by spouse. As per spouse, patient has been drinking extensively for 2 weeks. On day of admission, he found patient shaking and unresponsive. He then called EMS. En route to the hospital, patient became awake and started to become agitated. She received 6 mg ativan and 6mg versed in ED. History was obtained by  at bedside. She is AAO x2 to self and place. She then became agitated and received a dose of haldol with versed. Her blood alcohol level was elevated to 409. ICU was consulted for severe alcohol intoxication.    CT head was negative for any acute changes.     ICU course:  Patient was started on precedex for agitation. Treated with ativan 2mg q4 and ativan 2mg q2 PRN. Treated with banana bag, high dose thiamine, multivitamin, and folic acid.     Neurology, Dr. Dukes was consulted for seizure noted at home. EEG was negative for any seizure activity. Not recommended to start any anti-epileptic drugs.    Patient was taken off of precedex drip and standing dose of ativan was discontinued. Patient was changed to PO librium and was started on a taper regimen. PRN ativan was kept on if needed for agitation.    Patient also stated having left shoulder pain. X-ray done showed no signs of fracture or acute changes.     Patient stated she used to take Celexa for PTSD and depression but hasn't taken in about 6 months. Patient believes that the celexa would benefit her and help her prevent alcohol abuse. Spoke with psychiatrist, Dr. Lion, who recommended to start her on Celexa 20mg daily and have her see a psychiatrisy upon discharge.    Patient is medically and clinically stable for downgrade to the medical floor.    Things to follow:  -continue librium taper and monitor patient for alcohol withdrawal symptoms  -continue celexa and have patient follow up with psychiatrist as outpatient    Resident on call Dr. Baker PGY1 made aware of the downgrade. Attending Dr. Enriquez made aware of the downgrade.

## 2019-10-18 NOTE — DIETITIAN INITIAL EVALUATION ADULT. - OTHER INFO
Pt seen,  in and out of room. Pt reports good intake (>75% meals). Reports 15# unintentional weight loss (with usual weight 180#) over 6 weeks to work stress. Pt admit w/ severe EETOH intoxication, drinking last 2 weeks. ?weight (see 10/16) : both > 180#. RN confirms pt eating well.

## 2019-10-19 VITALS
TEMPERATURE: 98 F | OXYGEN SATURATION: 99 % | SYSTOLIC BLOOD PRESSURE: 131 MMHG | WEIGHT: 191.14 LBS | HEART RATE: 75 BPM | DIASTOLIC BLOOD PRESSURE: 79 MMHG | RESPIRATION RATE: 18 BRPM

## 2019-10-19 RX ORDER — THIAMINE MONONITRATE (VIT B1) 100 MG
1 TABLET ORAL
Qty: 30 | Refills: 0
Start: 2019-10-19 | End: 2019-11-17

## 2019-10-19 RX ORDER — CITALOPRAM 10 MG/1
1 TABLET, FILM COATED ORAL
Qty: 15 | Refills: 0
Start: 2019-10-19 | End: 2019-11-02

## 2019-10-19 RX ORDER — FOLIC ACID 0.8 MG
1 TABLET ORAL
Qty: 30 | Refills: 0
Start: 2019-10-19 | End: 2019-11-17

## 2019-10-19 RX ADMIN — Medication 105 MILLIGRAM(S): at 06:08

## 2019-10-19 RX ADMIN — Medication 50 MILLIGRAM(S): at 06:08

## 2019-10-19 NOTE — DISCHARGE NOTE PROVIDER - NSDCCPCAREPLAN_GEN_ALL_CORE_FT
PRINCIPAL DISCHARGE DIAGNOSIS  Diagnosis: Alcohol abuse  Assessment and Plan of Treatment: you presented with alcohol abuse and with seizure activity , you were initially admitted to ICU later than down graded to floor , we treated you initially with Ativan 2 mg and predexa drip, later it was discontinued and you were started on librium 50 mg PO, you are being discahrged on librium 25 mg PO for 3 days and celexa 20 mg for 15 days. you are recommended to follow up as out patient with psychiatrist . since you are leaving against medical advice the risks of leaving AMA have been exolained to you in detail .      SECONDARY DISCHARGE DIAGNOSES  Diagnosis: Seizure  Assessment and Plan of Treatment: you presented with alcohol abuse and with seizure activity , you were initially admitted to ICU later than down graded to floor , we treated you initially with Ativan 2 mg and predexa drip, later it was discontinued . Dr Dukes neurologist was consulted and we did EEG it was negative for any seizure activity . so antiseizure medications were not started.and you were started on librium 50 mg PO, you are being discahrged on librium 25 mg PO for 3 days and celexa 20 mg for 15 days. you are recommended to follow up as out patient with psychiatrist . since you are leaving against medical advice the risks of leaving AMA have been exolained to you in detail .

## 2019-10-19 NOTE — DISCHARGE NOTE PROVIDER - HOSPITAL COURSE
Patient is a 33 y/o F, with PMH of depression, who comes in with witnessed seizure by spouse. As per spouse, patient has been drinking extensively for 2 weeks. On day of admission, he found patient shaking and unresponsive. He then called EMS. En route to the hospital, patient became awake and started to become agitated. She received 6 mg ativan and 6mg versed in ED. History was obtained by  at bedside. She is AAO x2 to self and place. She then became agitated and received a dose of haldol with versed. Her blood alcohol level was elevated to 409. ICU was consulted for severe alcohol intoxication.        CT head was negative for any acute changes.         ICU course:    Patient was started on precedex for agitation. Treated with ativan 2mg q4 and ativan 2mg q2 PRN. Treated with banana bag, high dose thiamine, multivitamin, and folic acid.         Neurology, Dr. Dukes was consulted for seizure noted at home. EEG was negative for any seizure activity. Not recommended to start any anti-epileptic drugs.        Patient was taken off of precedex drip and standing dose of ativan was discontinued. Patient was changed to PO librium and was started on a taper regimen. PRN ativan was kept on if needed for agitation.        Patient also stated having left shoulder pain. X-ray done showed no signs of fracture or acute changes.         Patient stated she used to take Celexa for PTSD and depression but hasn't taken in about 6 months. Patient believes that the celexa would benefit her and help her prevent alcohol abuse. Spoke with psychiatrist, Dr. Lion, who recommended to start her on Celexa 20mg daily and have her see a psychiatrisy upon discharge.        Patient is medically and clinically stable for downgrade to the medical floor.        the plan was to taper librium and continue celexa 20 mg daily.    and she has to follow psychiatrist as outpatient for further recommendations.        Patient is leaving against medical advice, . the risks of leaving against medical advice such as seizures , including death are explained to patient . patient still wants to leave against medical advice.

## 2019-10-19 NOTE — DISCHARGE NOTE PROVIDER - CARE PROVIDER_API CALL
hCayo Lion)  Psychiatry  [address] [address] [place] [id number]  Phone: [phone]  Follow Up Time:

## 2019-10-19 NOTE — DISCHARGE NOTE NURSING/CASE MANAGEMENT/SOCIAL WORK - PATIENT PORTAL LINK FT
You can access the FollowMyHealth Patient Portal offered by [facility] by registering at the following website: [email or web address]. By joining [employer]’s FollowMyHealth portal, you will also be able to view your health information using other applications (apps) compatible with our system.

## 2019-10-31 PROCEDURE — 80076 HEPATIC FUNCTION PANEL: CPT

## 2019-10-31 PROCEDURE — 83605 ASSAY OF LACTIC ACID: CPT

## 2019-10-31 PROCEDURE — 73030 X-RAY EXAM OF SHOULDER: CPT

## 2019-10-31 PROCEDURE — 95957 EEG DIGITAL ANALYSIS: CPT

## 2019-10-31 PROCEDURE — 84100 ASSAY OF PHOSPHORUS: CPT

## 2019-10-31 PROCEDURE — 83735 ASSAY OF MAGNESIUM: CPT

## 2019-10-31 PROCEDURE — 96372 THER/PROPH/DIAG INJ SC/IM: CPT | Mod: XU

## 2019-10-31 PROCEDURE — 85027 COMPLETE CBC AUTOMATED: CPT

## 2019-10-31 PROCEDURE — 93005 ELECTROCARDIOGRAM TRACING: CPT

## 2019-10-31 PROCEDURE — 95819 EEG AWAKE AND ASLEEP: CPT

## 2019-10-31 PROCEDURE — 80307 DRUG TEST PRSMV CHEM ANLYZR: CPT

## 2019-10-31 PROCEDURE — 96374 THER/PROPH/DIAG INJ IV PUSH: CPT

## 2019-10-31 PROCEDURE — 80048 BASIC METABOLIC PNL TOTAL CA: CPT

## 2019-10-31 PROCEDURE — 80053 COMPREHEN METABOLIC PANEL: CPT

## 2019-10-31 PROCEDURE — 82962 GLUCOSE BLOOD TEST: CPT

## 2019-10-31 PROCEDURE — 82550 ASSAY OF CK (CPK): CPT

## 2019-10-31 PROCEDURE — 99285 EMERGENCY DEPT VISIT HI MDM: CPT | Mod: 25

## 2019-10-31 PROCEDURE — 84702 CHORIONIC GONADOTROPIN TEST: CPT

## 2019-10-31 PROCEDURE — 36415 COLL VENOUS BLD VENIPUNCTURE: CPT

## 2019-10-31 PROCEDURE — 70450 CT HEAD/BRAIN W/O DYE: CPT

## 2019-11-01 ENCOUNTER — OUTPATIENT (OUTPATIENT)
Dept: OUTPATIENT SERVICES | Facility: HOSPITAL | Age: 34
LOS: 1 days | End: 2019-11-01
Payer: MEDICAID

## 2019-11-01 PROCEDURE — G9001: CPT

## 2019-11-12 ENCOUNTER — INPATIENT (INPATIENT)
Facility: HOSPITAL | Age: 34
LOS: 2 days | Discharge: HOME | DRG: 896 | End: 2019-11-15
Attending: INTERNAL MEDICINE | Admitting: INTERNAL MEDICINE
Payer: MEDICAID

## 2019-11-12 VITALS
HEIGHT: 63 IN | SYSTOLIC BLOOD PRESSURE: 108 MMHG | WEIGHT: 164.91 LBS | DIASTOLIC BLOOD PRESSURE: 74 MMHG | OXYGEN SATURATION: 93 % | HEART RATE: 113 BPM | RESPIRATION RATE: 16 BRPM

## 2019-11-12 DIAGNOSIS — F10.230 ALCOHOL DEPENDENCE WITH WITHDRAWAL, UNCOMPLICATED: ICD-10-CM

## 2019-11-12 LAB
ANION GAP SERPL CALC-SCNC: 8 MMOL/L — SIGNIFICANT CHANGE UP (ref 5–17)
APAP SERPL-MCNC: <2 UG/ML — LOW (ref 10–30)
BASOPHILS # BLD AUTO: 0.05 K/UL — SIGNIFICANT CHANGE UP (ref 0–0.2)
BASOPHILS NFR BLD AUTO: 0.5 % — SIGNIFICANT CHANGE UP (ref 0–2)
BUN SERPL-MCNC: 9 MG/DL — SIGNIFICANT CHANGE UP (ref 7–18)
CALCIUM SERPL-MCNC: 8.5 MG/DL — SIGNIFICANT CHANGE UP (ref 8.4–10.5)
CHLORIDE SERPL-SCNC: 110 MMOL/L — HIGH (ref 96–108)
CO2 SERPL-SCNC: 27 MMOL/L — SIGNIFICANT CHANGE UP (ref 22–31)
CREAT SERPL-MCNC: 0.7 MG/DL — SIGNIFICANT CHANGE UP (ref 0.5–1.3)
EOSINOPHIL # BLD AUTO: 0.27 K/UL — SIGNIFICANT CHANGE UP (ref 0–0.5)
EOSINOPHIL NFR BLD AUTO: 2.6 % — SIGNIFICANT CHANGE UP (ref 0–6)
ETHANOL SERPL-MCNC: 400 MG/DL — HIGH (ref 0–10)
GLUCOSE SERPL-MCNC: 79 MG/DL — SIGNIFICANT CHANGE UP (ref 70–99)
HCG SERPL-ACNC: <1 MIU/ML — SIGNIFICANT CHANGE UP
HCT VFR BLD CALC: 44.2 % — SIGNIFICANT CHANGE UP (ref 34.5–45)
HGB BLD-MCNC: 14.3 G/DL — SIGNIFICANT CHANGE UP (ref 11.5–15.5)
IMM GRANULOCYTES NFR BLD AUTO: 0.3 % — SIGNIFICANT CHANGE UP (ref 0–1.5)
LYMPHOCYTES # BLD AUTO: 3.98 K/UL — HIGH (ref 1–3.3)
LYMPHOCYTES # BLD AUTO: 38.2 % — SIGNIFICANT CHANGE UP (ref 13–44)
MCHC RBC-ENTMCNC: 30.8 PG — SIGNIFICANT CHANGE UP (ref 27–34)
MCHC RBC-ENTMCNC: 32.4 GM/DL — SIGNIFICANT CHANGE UP (ref 32–36)
MCV RBC AUTO: 95.1 FL — SIGNIFICANT CHANGE UP (ref 80–100)
MONOCYTES # BLD AUTO: 0.88 K/UL — SIGNIFICANT CHANGE UP (ref 0–0.9)
MONOCYTES NFR BLD AUTO: 8.4 % — SIGNIFICANT CHANGE UP (ref 2–14)
NEUTROPHILS # BLD AUTO: 5.22 K/UL — SIGNIFICANT CHANGE UP (ref 1.8–7.4)
NEUTROPHILS NFR BLD AUTO: 50 % — SIGNIFICANT CHANGE UP (ref 43–77)
NRBC # BLD: 0 /100 WBCS — SIGNIFICANT CHANGE UP (ref 0–0)
PLATELET # BLD AUTO: 289 K/UL — SIGNIFICANT CHANGE UP (ref 150–400)
POTASSIUM SERPL-MCNC: 3.9 MMOL/L — SIGNIFICANT CHANGE UP (ref 3.5–5.3)
POTASSIUM SERPL-SCNC: 3.9 MMOL/L — SIGNIFICANT CHANGE UP (ref 3.5–5.3)
RBC # BLD: 4.65 M/UL — SIGNIFICANT CHANGE UP (ref 3.8–5.2)
RBC # FLD: 13.5 % — SIGNIFICANT CHANGE UP (ref 10.3–14.5)
SALICYLATES SERPL-MCNC: 3.9 MG/DL — SIGNIFICANT CHANGE UP (ref 2.8–20)
SODIUM SERPL-SCNC: 145 MMOL/L — SIGNIFICANT CHANGE UP (ref 135–145)
WBC # BLD: 10.43 K/UL — SIGNIFICANT CHANGE UP (ref 3.8–10.5)
WBC # FLD AUTO: 10.43 K/UL — SIGNIFICANT CHANGE UP (ref 3.8–10.5)

## 2019-11-12 PROCEDURE — 70450 CT HEAD/BRAIN W/O DYE: CPT | Mod: 26

## 2019-11-12 PROCEDURE — 99285 EMERGENCY DEPT VISIT HI MDM: CPT

## 2019-11-12 PROCEDURE — 99223 1ST HOSP IP/OBS HIGH 75: CPT

## 2019-11-12 RX ORDER — HALOPERIDOL DECANOATE 100 MG/ML
5 INJECTION INTRAMUSCULAR ONCE
Refills: 0 | Status: COMPLETED | OUTPATIENT
Start: 2019-11-12 | End: 2019-11-12

## 2019-11-12 RX ORDER — SODIUM CHLORIDE 9 MG/ML
1000 INJECTION, SOLUTION INTRAVENOUS
Refills: 0 | Status: DISCONTINUED | OUTPATIENT
Start: 2019-11-12 | End: 2019-11-15

## 2019-11-12 RX ORDER — THIAMINE MONONITRATE (VIT B1) 100 MG
500 TABLET ORAL EVERY 8 HOURS
Refills: 0 | Status: DISCONTINUED | OUTPATIENT
Start: 2019-11-12 | End: 2019-11-15

## 2019-11-12 RX ORDER — DIPHENHYDRAMINE HCL 50 MG
50 CAPSULE ORAL ONCE
Refills: 0 | Status: COMPLETED | OUTPATIENT
Start: 2019-11-12 | End: 2019-11-12

## 2019-11-12 RX ORDER — SODIUM CHLORIDE 9 MG/ML
1000 INJECTION INTRAMUSCULAR; INTRAVENOUS; SUBCUTANEOUS ONCE
Refills: 0 | Status: COMPLETED | OUTPATIENT
Start: 2019-11-12 | End: 2019-11-12

## 2019-11-12 RX ADMIN — SODIUM CHLORIDE 1000 MILLILITER(S): 9 INJECTION INTRAMUSCULAR; INTRAVENOUS; SUBCUTANEOUS at 18:27

## 2019-11-12 RX ADMIN — Medication 2 MILLIGRAM(S): at 16:39

## 2019-11-12 RX ADMIN — HALOPERIDOL DECANOATE 5 MILLIGRAM(S): 100 INJECTION INTRAMUSCULAR at 16:39

## 2019-11-12 RX ADMIN — Medication 50 MILLIGRAM(S): at 16:39

## 2019-11-12 NOTE — ED ADULT TRIAGE NOTE - CHIEF COMPLAINT QUOTE
as per EMS pt had a seizure witnessed  by her  , last drink was 2 bours ago , ems found pt to be combative , given 10 mg of im Versed

## 2019-11-12 NOTE — ED PROVIDER NOTE - OBJECTIVE STATEMENT
33 y/o F with no significant PMHx/PSHx c/o Alcohol Withdrawal Seizures, EtOH Abuse, EtOH Abuse bib EMS presenting to ED s/p seizure, with last drink 2-hours ago. As per , he found pt seizing on couch at home and drooling. The pt was last seen at [facility] ED on Oct 16 for EtOH withdrawal. Pt was sedated heavily and in ICU on a Precedex Drip. En route to ED pt was combative, given 20mg of Versed. In ED, pt is intoxicated and agitated. Pt is also a poor historian. NKDA. 35 y/o F with no significant PMHx/PSHx c/o Alcohol Withdrawal Seizures, EtOH Abuse, bib EMS presenting to ED s/p seizure, with last drink 2-hours ago. As per , he found pt seizing on couch at home and drooling. The pt was last seen at [facility] ED on Oct 16 for EtOH withdrawal. Pt was sedated heavily and in ICU on a Precedex Drip. En route to ED pt was combative, given 10mg of Versed. Patient reported to drink 1 gallon of tequila a day. In ED, pt is intoxicated and agitated. Pt is also a poor historian. NKDA.

## 2019-11-12 NOTE — H&P ADULT - PROBLEM SELECTOR PLAN 1
likely alcohol withdrawal seizure as pt suddenly stopped alcohol   CT head neg, no hypoglycemia, no electrolytes abnormality   s/p 10mg midazolam, ativan, haldol, benadryl   EKG sinus tachy  will monitor on ciwa protocol   ativan 2 q2 prn + ativan 2 q4 standing + ativan 2 q2 prn for break trough seizure   banana bag, thiamin   had eeg on last admission - Generalized background slowing and Diffuse beta activity  will order eeg  monitor for signs of DTs  neurology consult in am   npo, seizure precaution, aspiration precaution   constant observation given agitation and combativeness

## 2019-11-12 NOTE — ED PROVIDER NOTE - CARE PLAN
Principal Discharge DX:	Alcohol withdrawal seizure without complication  Secondary Diagnosis:	Alcohol abuse

## 2019-11-12 NOTE — H&P ADULT - NSHPPHYSICALEXAM_GEN_ALL_CORE
Vital Signs Last 24 Hrs  T(C): 36.6 (13 Nov 2019 01:03), Max: 36.6 (13 Nov 2019 01:03)  T(F): 97.9 (13 Nov 2019 01:03), Max: 97.9 (13 Nov 2019 01:03)  HR: 107 (13 Nov 2019 01:03) (107 - 113)  BP: 122/63 (13 Nov 2019 01:03) (108/74 - 122/63)  BP(mean): --  RR: 18 (13 Nov 2019 01:03) (16 - 18)  SpO2: 93% (13 Nov 2019 01:03) (93% - 93%)    PHYSICAL EXAM:  GENERAL: NAD, well-developed, sedated  HEAD:  Atraumatic, Normocephalic  EYES: EOMI, PERRLA, conjunctiva and sclera clear  NECK: Supple, No JVD  CHEST/LUNG: Clear to auscultation bilaterally; No wheeze  HEART: Regular rate and rhythm; s1+ s2+  ABDOMEN: Soft, Nontender, Nondistended; Bowel sounds present  EXTREMITIES:, No clubbing, cyanosis, or edema  NEUROLOGY: responds to verbal stimuli   SKIN: No rashes or lesions

## 2019-11-12 NOTE — ED ADULT NURSE NOTE - OBJECTIVE STATEMENT
As per  pt was drinking a gallon and a half of Tequila x 1week pt BIB EMS sedated with Versed 10mg EMS stated pt was combative

## 2019-11-12 NOTE — ED ADULT NURSE REASSESSMENT NOTE - NS ED NURSE REASSESS COMMENT FT1
Report received from RN, pt is in bed, in NAD, pt is asleep, responsive to stimuli, on cardiac monitor, will continue to observe.   2330- pt reevaluated by provider, pt put on 1:1, pt is awake, responsive, CIWA assessment done.  0315- pt is awake, in NAD, CIWA assessment done, 1:1 constant observation maintained. pt requested for water, given to pt,  meds given as ordered. will continue to monitor pt.

## 2019-11-12 NOTE — ED PROVIDER NOTE - CLINICAL SUMMARY MEDICAL DECISION MAKING FREE TEXT BOX
33 yo F with alcohol intoxication. Combative and agitated on arrival. ETOH level of 400. Witnessed seizure at home. Given haldol, ativan, and benadryl. CT head unremarkable. Labs grossly unremarkable. Will admit for further treatment and monitoring. Dr. Robert defers admission to hospitalist.

## 2019-11-12 NOTE — H&P ADULT - HISTORY OF PRESENT ILLNESS
Patient is a 35 y/o F with PMH of depression, ETOH use comes in with witnessed seizure by spouse.   per EMS report pt had one tonic clonic seizure witnessed by  likely alcohol withdrawal seizure as pt drank alcohol for 4-5 days and stopped it, at the arrival of EMS pt was combative and was yelling, fighting and splitting, as pt was high risk to harm her self and others pt was given midazolam 10mg IM and pt was brought to the ED. In the ED pt got ativan, benadryl and haldol on examination pt was sedated. Responsive to verbal stimuli. Per ED note pt drinks 1 gallon of tequila daily. Pt was recently admitted to the [facility] ICU for management of alcohol withdrawal requiring Precedex. Currently pt is not able to provide HPI.

## 2019-11-12 NOTE — H&P ADULT - ASSESSMENT
34 year old female pmhx of alcohol withdrawal seizure, etoh use, depression being admitted for alcohol withdrawal management.

## 2019-11-12 NOTE — H&P ADULT - ATTENDING COMMENTS
Vital Signs Last 24 Hrs  T(C): --  T(F): --  HR: 113 (12 Nov 2019 16:11) (113 - 113)  BP: 108/74 (12 Nov 2019 16:11) (108/74 - 108/74)  BP(mean): --  RR: 16 (12 Nov 2019 16:11) (16 - 16)  SpO2: 93% (12 Nov 2019 16:11) (93% - 93%) Patient seen and examined ; case was discussed with the admitting resident    ROS: as in the HPI; all other ROS negative    SH and family history as above    Vital Signs Last 24 Hrs  T(C): 37.4 (13 Nov 2019 05:14), Max: 37.4 (13 Nov 2019 05:14)  T(F): 99.3 (13 Nov 2019 05:14), Max: 99.3 (13 Nov 2019 05:14)  HR: 100 (13 Nov 2019 05:14) (97 - 113)  BP: 130/69 (13 Nov 2019 05:14) (108/74 - 130/69)  BP(mean): --  RR: 23 (13 Nov 2019 05:14) (16 - 23)  SpO2: 94% (13 Nov 2019 05:14) (93% - 94%)    GEN: NAD, somnolent, flushed appearance   HEENT- normocephalic; mouth moist  CVS- S1S2+  LUNGS- clear to auscultation; no wheezing  ABD: Soft , nontender, nondistended, Bowel sounds are present  EXTREMITY: no calf tenderness, no cyanosis, no edema  NEURO: GCS 10  PSYCH: unable to assess 2/2 medication effect, intoxication   VASCULAR: ++ distal peripheral pulses  SKIN: warm and dry.       Labs Reviewed:                         13.9   9.89  )-----------( 251      ( 13 Nov 2019 05:47 )             42.1     11-13    143  |  111<H>  |  12  ----------------------------<  65<L>  4.0   |  24  |  0.54    Ca    8.0<L>      13 Nov 2019 05:47  Phos  2.6     11-13  Mg     1.6     11-13    TPro  6.4  /  Alb  3.2<L>  /  TBili  0.4  /  DBili  <0.1  /  AST  41<H>  /  ALT  41  /  AlkPhos  45  11-13    CARDIAC MARKERS ( 12 Nov 2019 17:01 )  x     / x     / 221 U/L / x     / x              BNP:   MEDICATIONS  (STANDING):  lactated ringers. 1000 milliLiter(s) (100 mL/Hr) IV Continuous <Continuous>  LORazepam   Injectable 2 milliGRAM(s) IV Push every 4 hours  multivitamin/thiamine/folic acid in sodium chloride 0.9% 1000 milliLiter(s) (100 mL/Hr) IV Continuous <Continuous>  pantoprazole  Injectable 40 milliGRAM(s) IV Push daily  thiamine IVPB 500 milliGRAM(s) IV Intermittent every 8 hours    MEDICATIONS  (PRN):  LORazepam   Injectable 2 milliGRAM(s) IV Push every 2 hours PRN CIWA-Ar score 8 or greater  LORazepam   Injectable 2 milliGRAM(s) IV Push every 2 hours PRN for break through seizure      CXR reviewed  EKG Reviewed    35 y/o F with alcoholism admitted with seizures and aggressive behavior.     1. Encephalopathy- toxic metabolic with alcohol withdrawal, possible post ictal state, check utox, scheduled Ativan given recent ICU stay requiring cont precedex infusion. Enhanced supervision given aggressive/ violent behavior pta and multiple sedatives   2. Seizure d/o- cont AEDs, neurology consult   3. Alcoholism- ciwa monitoring, supplements, SW for resources   4. Depression   Plan of care discussed with patient ;  all questions and concerns were addressed.

## 2019-11-13 DIAGNOSIS — F32.9 MAJOR DEPRESSIVE DISORDER, SINGLE EPISODE, UNSPECIFIED: ICD-10-CM

## 2019-11-13 DIAGNOSIS — Z29.9 ENCOUNTER FOR PROPHYLACTIC MEASURES, UNSPECIFIED: ICD-10-CM

## 2019-11-13 DIAGNOSIS — F10.230 ALCOHOL DEPENDENCE WITH WITHDRAWAL, UNCOMPLICATED: ICD-10-CM

## 2019-11-13 DIAGNOSIS — F10.10 ALCOHOL ABUSE, UNCOMPLICATED: ICD-10-CM

## 2019-11-13 DIAGNOSIS — R56.9 UNSPECIFIED CONVULSIONS: ICD-10-CM

## 2019-11-13 LAB
ALBUMIN SERPL ELPH-MCNC: 3.2 G/DL — LOW (ref 3.5–5)
ALP SERPL-CCNC: 45 U/L — SIGNIFICANT CHANGE UP (ref 40–120)
ALT FLD-CCNC: 41 U/L DA — SIGNIFICANT CHANGE UP (ref 10–60)
ANION GAP SERPL CALC-SCNC: 8 MMOL/L — SIGNIFICANT CHANGE UP (ref 5–17)
AST SERPL-CCNC: 41 U/L — HIGH (ref 10–40)
BILIRUB DIRECT SERPL-MCNC: <0.1 MG/DL — SIGNIFICANT CHANGE UP (ref 0–0.2)
BILIRUB INDIRECT FLD-MCNC: >0.3 MG/DL — SIGNIFICANT CHANGE UP (ref 0.2–1)
BILIRUB SERPL-MCNC: 0.4 MG/DL — SIGNIFICANT CHANGE UP (ref 0.2–1.2)
BUN SERPL-MCNC: 12 MG/DL — SIGNIFICANT CHANGE UP (ref 7–18)
CALCIUM SERPL-MCNC: 8 MG/DL — LOW (ref 8.4–10.5)
CHLORIDE SERPL-SCNC: 111 MMOL/L — HIGH (ref 96–108)
CO2 SERPL-SCNC: 24 MMOL/L — SIGNIFICANT CHANGE UP (ref 22–31)
CREAT SERPL-MCNC: 0.54 MG/DL — SIGNIFICANT CHANGE UP (ref 0.5–1.3)
GLUCOSE BLDC GLUCOMTR-MCNC: 76 MG/DL — SIGNIFICANT CHANGE UP (ref 70–99)
GLUCOSE SERPL-MCNC: 65 MG/DL — LOW (ref 70–99)
HCT VFR BLD CALC: 42.1 % — SIGNIFICANT CHANGE UP (ref 34.5–45)
HGB BLD-MCNC: 13.9 G/DL — SIGNIFICANT CHANGE UP (ref 11.5–15.5)
MAGNESIUM SERPL-MCNC: 1.6 MG/DL — SIGNIFICANT CHANGE UP (ref 1.6–2.6)
MCHC RBC-ENTMCNC: 30.9 PG — SIGNIFICANT CHANGE UP (ref 27–34)
MCHC RBC-ENTMCNC: 33 GM/DL — SIGNIFICANT CHANGE UP (ref 32–36)
MCV RBC AUTO: 93.6 FL — SIGNIFICANT CHANGE UP (ref 80–100)
NRBC # BLD: 0 /100 WBCS — SIGNIFICANT CHANGE UP (ref 0–0)
PCP SPEC-MCNC: SIGNIFICANT CHANGE UP
PHOSPHATE SERPL-MCNC: 2.6 MG/DL — SIGNIFICANT CHANGE UP (ref 2.5–4.5)
PLATELET # BLD AUTO: 251 K/UL — SIGNIFICANT CHANGE UP (ref 150–400)
POTASSIUM SERPL-MCNC: 4 MMOL/L — SIGNIFICANT CHANGE UP (ref 3.5–5.3)
POTASSIUM SERPL-SCNC: 4 MMOL/L — SIGNIFICANT CHANGE UP (ref 3.5–5.3)
PROT SERPL-MCNC: 6.4 G/DL — SIGNIFICANT CHANGE UP (ref 6–8.3)
RBC # BLD: 4.5 M/UL — SIGNIFICANT CHANGE UP (ref 3.8–5.2)
RBC # FLD: 13.5 % — SIGNIFICANT CHANGE UP (ref 10.3–14.5)
SODIUM SERPL-SCNC: 143 MMOL/L — SIGNIFICANT CHANGE UP (ref 135–145)
WBC # BLD: 9.89 K/UL — SIGNIFICANT CHANGE UP (ref 3.8–10.5)
WBC # FLD AUTO: 9.89 K/UL — SIGNIFICANT CHANGE UP (ref 3.8–10.5)

## 2019-11-13 PROCEDURE — 99223 1ST HOSP IP/OBS HIGH 75: CPT

## 2019-11-13 RX ORDER — PANTOPRAZOLE SODIUM 20 MG/1
40 TABLET, DELAYED RELEASE ORAL DAILY
Refills: 0 | Status: DISCONTINUED | OUTPATIENT
Start: 2019-11-13 | End: 2019-11-15

## 2019-11-13 RX ORDER — INFLUENZA VIRUS VACCINE 15; 15; 15; 15 UG/.5ML; UG/.5ML; UG/.5ML; UG/.5ML
0.5 SUSPENSION INTRAMUSCULAR ONCE
Refills: 0 | Status: DISCONTINUED | OUTPATIENT
Start: 2019-11-13 | End: 2019-11-15

## 2019-11-13 RX ADMIN — SODIUM CHLORIDE 100 MILLILITER(S): 9 INJECTION, SOLUTION INTRAVENOUS at 02:13

## 2019-11-13 RX ADMIN — SODIUM CHLORIDE 100 MILLILITER(S): 9 INJECTION, SOLUTION INTRAVENOUS at 14:58

## 2019-11-13 RX ADMIN — Medication 105 MILLIGRAM(S): at 03:07

## 2019-11-13 RX ADMIN — PANTOPRAZOLE SODIUM 40 MILLIGRAM(S): 20 TABLET, DELAYED RELEASE ORAL at 12:20

## 2019-11-13 RX ADMIN — SODIUM CHLORIDE 100 MILLILITER(S): 9 INJECTION, SOLUTION INTRAVENOUS at 03:07

## 2019-11-13 RX ADMIN — Medication 2 MILLIGRAM(S): at 03:08

## 2019-11-13 RX ADMIN — Medication 105 MILLIGRAM(S): at 22:26

## 2019-11-13 RX ADMIN — Medication 105 MILLIGRAM(S): at 16:03

## 2019-11-13 RX ADMIN — SODIUM CHLORIDE 100 MILLILITER(S): 9 INJECTION, SOLUTION INTRAVENOUS at 12:21

## 2019-11-13 RX ADMIN — Medication 2 MILLIGRAM(S): at 16:04

## 2019-11-13 NOTE — CHART NOTE - NSCHARTNOTEFT_GEN_A_CORE
Patient is a 35 y/o female with PMH of depression, ETOH abuse, recent ICU admission,  presented to ED with aggressive behavior,  sp episode of  tonic clonic seizure at home  witnessed by . Admitted for encephalopathy, likely alcohol withdrawal seizure. Agressive behavior upon arrival to ED.   CT head neg, glucose and  electrolytes WNL. EKG sinus tachy.  received 10mg midazolam, ativan, haldol, benadryl. Seen at bedside, sleepy, drowsy, opens eyes to voice, NAD, no tremors, slightly tachycardic. Enhanced supervision.     OBJECTIVE:     Vital Signs Last 24 Hrs  T(C): 36.9 (13 Nov 2019 07:31), Max: 37.4 (13 Nov 2019 05:14)  T(F): 98.4 (13 Nov 2019 07:31), Max: 99.3 (13 Nov 2019 05:14)  HR: 95 (13 Nov 2019 07:31) (95 - 113)  BP: 142/55 (13 Nov 2019 07:31) (108/74 - 142/55)  BP(mean): --  RR: 23 (13 Nov 2019 07:31) (16 - 23)  SpO2: 97% (13 Nov 2019 07:31) (93% - 97%)        PHYSICAL EXAM:     GENERAL: drowsy, opens eye to verbal stimuli   CVS- S1S2+, tachycardic to 102 bpm  LUNGS- clear to auscultation;  ABD: Soft , nontender, nondistended, bowel sounds are present  EXTREMITY: no calf tenderness, no cyanosis, no edema  NEURO: drowsy/sleepy, opens eyes to voice, no tremors  SKIN: warm,  dry.       ASSESSMENT AND PLAN:     - c/w  ciwa protocol   - seizure, aspiration and fall precautions  - monitor electrolytes   - enhanced supervision for  aggressive, violent behavior   - neurology consulted Dr Tamayo   - EEG pending   -

## 2019-11-13 NOTE — PROGRESS NOTE ADULT - SUBJECTIVE AND OBJECTIVE BOX
HPI:  Patient is a 33 y/o F with PMH of depression, ETOH use comes in with witnessed seizure by spouse.   per EMS report pt had one tonic clonic seizure witnessed by  likely alcohol withdrawal seizure as pt drank alcohol for 4-5 days and stopped it, at the arrival of EMS pt was combative and was yelling, fighting and splitting, as pt was high risk to harm her self and others pt was given midazolam 10mg IM and pt was brought to the ED. In the ED pt got ativan, benadryl and haldol on examination pt was sedated. Responsive to verbal stimuli. Per ED note pt drinks 1 gallon of tequila daily. Pt was recently admitted to the [facility] ICU for management of alcohol withdrawal requiring Precedex. Currently pt is not able to provide HPI. (12 Nov 2019 22:21)      Patient is a 34y old  Female who presents with a chief complaint of Seizure (13 Nov 2019 11:57)      INTERVAL HPI/OVERNIGHT EVENTS:  T(C): 36.7 (11-13-19 @ 11:57), Max: 37.4 (11-13-19 @ 05:14)  HR: 88 (11-13-19 @ 11:57) (88 - 113)  BP: 128/74 (11-13-19 @ 11:57) (108/74 - 142/55)  RR: 17 (11-13-19 @ 11:57) (16 - 23)  SpO2: 97% (11-13-19 @ 11:57) (93% - 98%)  Wt(kg): --  I&O's Summary      REVIEW OF SYSTEMS: denies fever, chills, SOB, palpitations, chest pain, abdominal pain, nausea, vomitting, diarrhea, constipation, dizziness    MEDICATIONS  (STANDING):  lactated ringers. 1000 milliLiter(s) (100 mL/Hr) IV Continuous <Continuous>  LORazepam   Injectable 2 milliGRAM(s) IV Push every 4 hours  multivitamin/thiamine/folic acid in sodium chloride 0.9% 1000 milliLiter(s) (100 mL/Hr) IV Continuous <Continuous>  pantoprazole  Injectable 40 milliGRAM(s) IV Push daily  thiamine IVPB 500 milliGRAM(s) IV Intermittent every 8 hours    MEDICATIONS  (PRN):  LORazepam   Injectable 2 milliGRAM(s) IV Push every 2 hours PRN CIWA-Ar score 8 or greater  LORazepam   Injectable 2 milliGRAM(s) IV Push every 2 hours PRN for break through seizure      PHYSICAL EXAM:  GENERAL: NAD, well-groomed, well-developed  HEAD:  Atraumatic, Normocephalic  EYES: EOMI, PERRLA, conjunctiva and sclera clear  ENMT: No tonsillar erythema, exudates, or enlargement; Moist mucous membranes, Good dentition, No lesions  NECK: Supple, No JVD, Normal thyroid  NERVOUS SYSTEM:  Alert & Oriented X3, Good concentration; Motor Strength 5/5 B/L upper and lower extremities; DTRs 2+ intact and symmetric  CHEST/LUNG: Clear to percussion bilaterally; No rales, rhonchi, wheezing, or rubs  HEART: Regular rate and rhythm; No murmurs, rubs, or gallops  ABDOMEN: Soft, Nontender, Nondistended; Bowel sounds present  EXTREMITIES:  2+ Peripheral Pulses, No clubbing, cyanosis, or edema  LYMPH: No lymphadenopathy noted  SKIN: No rashes or lesions  LABS:                        13.9   9.89  )-----------( 251      ( 13 Nov 2019 05:47 )             42.1     11-13    143  |  111<H>  |  12  ----------------------------<  65<L>  4.0   |  24  |  0.54    Ca    8.0<L>      13 Nov 2019 05:47  Phos  2.6     11-13  Mg     1.6     11-13    TPro  6.4  /  Alb  3.2<L>  /  TBili  0.4  /  DBili  <0.1  /  AST  41<H>  /  ALT  41  /  AlkPhos  45  11-13        CAPILLARY BLOOD GLUCOSE      POCT Blood Glucose.: 76 mg/dL (13 Nov 2019 09:42)  POCT Blood Glucose.: 73 mg/dL (12 Nov 2019 16:12)

## 2019-11-13 NOTE — PROGRESS NOTE ADULT - ASSESSMENT
keli nd examined  vsstable afebrile   pt has h/o etoh abuse   has  a couple of admission  also has h/o depression.   brought to er sec to episode of seizure at home.  as per pt was drinking heavily until 3 days ago then she didnt drink  but in er etoh level was 400  wasn admittedb  given benzodiazepine.  pt now arousable can answer questions  denies headche abd pain  no nausea or vomiting  no tremers  labs noted  platelets, k are ok  albumin low but lft are acceptable  iv ativan    neurology  psych for depression

## 2019-11-13 NOTE — CONSULT NOTE ADULT - ASSESSMENT
The patient pw seizure cw alcohol withdraw, unclear if has had prior alcohol withdraw seizures    Recommend:  MRI brain  EEG  sz and fall ppx  no AED for now  cw treatment for alcohol withdraw as per primary team The patient pw seizure cw alcohol withdraw, unclear if has had prior alcohol withdraw seizures    Recommend:  MRI brain  EEG  sz and fall ppx  no AED for now  cw treatment for alcohol withdraw as per primary team    Dr. Enriquez

## 2019-11-13 NOTE — CONSULT NOTE ADULT - SUBJECTIVE AND OBJECTIVE BOX
Patient is a 34y old  Female who presents with a chief complaint of Seizure (12 Nov 2019 22:21)      HPI:  Patient is a 35 y/o F with PMH of depression, ETOH use comes in with witnessed seizure by spouse.   per EMS report pt had one tonic clonic seizure witnessed by  likely alcohol withdrawal seizure as pt drank alcohol for 4-5 days and stopped it, at the arrival of EMS pt was combative and was yelling, fighting and splitting, as pt was high risk to harm her self and others pt was given midazolam 10mg IM and pt was brought to the ED. In the ED pt got ativan, benadryl and haldol on examination pt was sedated. Responsive to verbal stimuli. Per ED note pt drinks 1 gallon of tequila daily. Pt was recently admitted to the [facility] ICU for management of alcohol withdrawal requiring Precedex. Currently pt is not able to provide HPI. (12 Nov 2019 22:21)         Neurological Review of Systems:  No difficulty with language.  No vision loss or double vision.  No dizziness, vertigo or new hearing loss.  No difficulty with speech or swallowing.  No focal weakness.  No focal sensory changes.  No numbness or tingling in the bilateral lower extremities.  No difficulty with balance.  No difficulty with ambulation.        MEDICATIONS  (STANDING):  lactated ringers. 1000 milliLiter(s) (100 mL/Hr) IV Continuous <Continuous>  LORazepam   Injectable 2 milliGRAM(s) IV Push every 4 hours  multivitamin/thiamine/folic acid in sodium chloride 0.9% 1000 milliLiter(s) (100 mL/Hr) IV Continuous <Continuous>  pantoprazole  Injectable 40 milliGRAM(s) IV Push daily  thiamine IVPB 500 milliGRAM(s) IV Intermittent every 8 hours    MEDICATIONS  (PRN):  LORazepam   Injectable 2 milliGRAM(s) IV Push every 2 hours PRN CIWA-Ar score 8 or greater  LORazepam   Injectable 2 milliGRAM(s) IV Push every 2 hours PRN for break through seizure    Allergies    No Known Allergies    Intolerances      PAST MEDICAL & SURGICAL HISTORY:  Depression  Alcohol abuse  No significant past surgical history    FAMILY HISTORY:    SOCIAL HISTORY: non smoker/ former smoker/ active smoker    Review of Systems:  Constitutional: No generalized weakness. No fevers or chills.                    Eyes, Ears, Mouth, Throat: No vision loss   Respiratory: No shortness of breath or cough.                                Cardiovascular: No chest pain or palpitations  Gastrointestinal: No nausea or vomiting.                                         Genitourinary: No urinary incontinence or burning on urination.  Musculoskeletal: No joint pain.                                                           Dermatologic: No rash.  Neurological: as per HPI                                                                      Psychiatric: No behavioral problems.  Endocrine: No known hypoglycemia.               Hematologic/Lymphatic: No easy bleeding.    O:  Vital Signs Last 24 Hrs  T(C): 36.8 (13 Nov 2019 10:59), Max: 37.4 (13 Nov 2019 05:14)  T(F): 98.3 (13 Nov 2019 10:59), Max: 99.3 (13 Nov 2019 05:14)  HR: 106 (13 Nov 2019 10:59) (95 - 113)  BP: 128/61 (13 Nov 2019 10:59) (108/74 - 142/55)  BP(mean): --  RR: 20 (13 Nov 2019 10:59) (16 - 23)  SpO2: 98% (13 Nov 2019 10:59) (93% - 98%)    General Exam:   General appearance: No acute distress                 Cardiovascular: Pedal dorsalis pulses intact bilaterally    Mental Status: Orientated to self, date and place.  Attention intact.  No dysarthria, aphasia or neglect.  Knowledge intact.  Registration intact.  Short and long term memory grossly intact.      Cranial Nerves: CN I - not tested.  PERRL, EOMI, VFF, no nystagmus or diplopia.  No APD.  Fundi not visualized.  CN V1-3 intact to light touch and pinprick.  No facial asymmetry.  Hearing intact to finger rub bilaterally.  Tongue, uvula and palate midline.  Sternocleidomastoid and Trapezius intact bilaterally.    Motor:   Tone: normal.                  Strength intact throughout  No pronator drift bilaterally                      No dysmetria on finger-nose-finger or heel-shin-heel  No truncal ataxia.  No resting, postural or action tremor.  No myoclonus.    Sensation: intact to light touch, pinprick, vibration and proprioception    Deep Tendon Reflexes: 1+ bilateral biceps, triceps, brachioradialis, knee and ankle  Toes flexor bilaterally    Gait: normal and stable.  Rhomberg -jackelyn.    Other:     LABS:                        13.9   9.89  )-----------( 251      ( 13 Nov 2019 05:47 )             42.1     11-13    143  |  111<H>  |  12  ----------------------------<  65<L>  4.0   |  24  |  0.54    Ca    8.0<L>      13 Nov 2019 05:47  Phos  2.6     11-13  Mg     1.6     11-13    TPro  6.4  /  Alb  3.2<L>  /  TBili  0.4  /  DBili  <0.1  /  AST  41<H>  /  ALT  41  /  AlkPhos  45  11-13            RADIOLOGY & ADDITIONAL STUDIES:    EKG: < from: 12 Lead ECG (10.16.19 @ 11:48) >  Diagnosis Line Sinus tachycardia  Otherwise normal ECG    < end of copied text >    < from: CT Head No Cont (11.12.19 @ 18:49) > (images reviwed)  EXAM:  CT BRAIN                            PROCEDURE DATE:  11/12/2019          INTERPRETATION:  CLINICAL INDICATION: Seizures.    TECHNIQUE: CT of the head was performed without the administration of   intravenous contrast.    COMPARISON: CT head 10/16/2019.    FINDINGS:    No evidence of acute infarction, intracranial hemorrhage or mass lesion.    The ventricles are normal without evidence of hydrocephalus. There are no   extra-axial fluid collections.    The visualized intraorbital contents are normal. The imaged portions of   the paranasal sinuses are clear. The mastoid air cells are clear. The   visualized soft tissues and osseous structures appear normal.    IMPRESSION:     No acute intracranial findings.                PAWEL BROWN, ATTENDING RADIOLOGIST  This document has been electronically signed. Nov 12 2019  7:03PM        < end of copied text > Patient is a 34y old  Female who presents with a chief complaint of Seizure (12 Nov 2019 22:21)      HPI:  Patient is a 33 y/o F with PMH of depression, ETOH use comes in with witnessed seizure by spouse, patient does not recall event.  As per EMS report pt had one tonic clonic seizure witnessed by  likely alcohol withdrawal seizure as pt drank alcohol for 4-5 days and stopped it, at the arrival of EMS pt was combative and was yelling, fighting and splitting, as pt was high risk to harm her self and others pt was given midazolam 10mg IM and pt was brought to the ED. In the ED pt got ativan, benadryl and haldol on examination pt was sedated. Responsive to verbal stimuli. Per ED note pt drinks 1 gallon of tequila daily. Pt was recently admitted to the [facility] ICU for management of alcohol withdrawal requiring Precedex. Currently pt denies any history of prior seizures.  Says that her last drink was 2-3 days ago.    Neurological Review of Systems:  No difficulty with language.  No vision loss or double vision.  No dizziness, vertigo or new hearing loss.  No difficulty with speech or swallowing.  No focal weakness.  No focal sensory changes.  No numbness or tingling in the bilateral lower extremities.  No difficulty with balance.  No difficulty with ambulation.        MEDICATIONS  (STANDING):  lactated ringers. 1000 milliLiter(s) (100 mL/Hr) IV Continuous <Continuous>  LORazepam   Injectable 2 milliGRAM(s) IV Push every 4 hours  multivitamin/thiamine/folic acid in sodium chloride 0.9% 1000 milliLiter(s) (100 mL/Hr) IV Continuous <Continuous>  pantoprazole  Injectable 40 milliGRAM(s) IV Push daily  thiamine IVPB 500 milliGRAM(s) IV Intermittent every 8 hours    MEDICATIONS  (PRN):  LORazepam   Injectable 2 milliGRAM(s) IV Push every 2 hours PRN CIWA-Ar score 8 or greater  LORazepam   Injectable 2 milliGRAM(s) IV Push every 2 hours PRN for break through seizure    Allergies    No Known Allergies    Intolerances      PAST MEDICAL & SURGICAL HISTORY:  Depression  Alcohol abuse  No significant past surgical history    FAMILY HISTORY: nc mother    SOCIAL HISTORY: non smoker    Review of Systems:  Constitutional: No fevers.                    Eyes, Ears, Mouth, Throat: No vision loss   Respiratory: No shortness of breath .                                Cardiovascular: No chest pain   Gastrointestinal: No vomiting.                                         Genitourinary: No urinary incontinence  Musculoskeletal: No joint pain.                                                           Dermatologic: No rash.  Neurological: as per HPI                                                                      Psychiatric: No behavioral problems.  Endocrine: No known hypoglycemia.               Hematologic/Lymphatic: No easy bleeding.    O:  Vital Signs Last 24 Hrs  T(C): 36.8 (13 Nov 2019 10:59), Max: 37.4 (13 Nov 2019 05:14)  T(F): 98.3 (13 Nov 2019 10:59), Max: 99.3 (13 Nov 2019 05:14)  HR: 106 (13 Nov 2019 10:59) (95 - 113)  BP: 128/61 (13 Nov 2019 10:59) (108/74 - 142/55)  BP(mean): --  RR: 20 (13 Nov 2019 10:59) (16 - 23)  SpO2: 98% (13 Nov 2019 10:59) (93% - 98%)    General Exam:   General appearance: No acute distress                 Cardiovascular: Pedal dorsalis pulses intact bilaterally    Mental Status: Orientated to self, date and place.  Attention intact.  No dysarthria, aphasia or neglect.  Knowledge intact.  Registration intact.  Short and long term memory grossly intact.      Cranial Nerves: CN I - not tested.  PERRL, EOMI, VFF, no nystagmus or diplopia.  No APD.  Fundi not visualized.  CN V1-3 intact to light touch.  No facial asymmetry.  Hearing intact to finger rub bilaterally.  Tongue, uvula and palate midline.  Sternocleidomastoid and Trapezius intact bilaterally.    Motor:   Tone: normal.                  Strength intact throughout  No pronator drift bilaterally                      No dysmetria on finger-nose-finger or heel-shin-heel  + bl hand tremors    Sensation: intact to light touch    Deep Tendon Reflexes: 1+ bilateral biceps, triceps, brachioradialis, knee and ankle  Toes flexor bilaterally    Gait: patient declines    Other:     LABS:                        13.9   9.89  )-----------( 251      ( 13 Nov 2019 05:47 )             42.1     11-13    143  |  111<H>  |  12  ----------------------------<  65<L>  4.0   |  24  |  0.54    Ca    8.0<L>      13 Nov 2019 05:47  Phos  2.6     11-13  Mg     1.6     11-13    TPro  6.4  /  Alb  3.2<L>  /  TBili  0.4  /  DBili  <0.1  /  AST  41<H>  /  ALT  41  /  AlkPhos  45  11-13      Utox -jackelyn      RADIOLOGY & ADDITIONAL STUDIES:    EKG: < from: 12 Lead ECG (10.16.19 @ 11:48) >  Diagnosis Line Sinus tachycardia  Otherwise normal ECG    < end of copied text >    < from: CT Head No Cont (11.12.19 @ 18:49) > (images reviwed)  EXAM:  CT BRAIN                            PROCEDURE DATE:  11/12/2019          INTERPRETATION:  CLINICAL INDICATION: Seizures.    TECHNIQUE: CT of the head was performed without the administration of   intravenous contrast.    COMPARISON: CT head 10/16/2019.    FINDINGS:    No evidence of acute infarction, intracranial hemorrhage or mass lesion.    The ventricles are normal without evidence of hydrocephalus. There are no   extra-axial fluid collections.    The visualized intraorbital contents are normal. The imaged portions of   the paranasal sinuses are clear. The mastoid air cells are clear. The   visualized soft tissues and osseous structures appear normal.    IMPRESSION:     No acute intracranial findings.                PAWEL BROWN, ATTENDING RADIOLOGIST  This document has been electronically signed. Nov 12 2019  7:03PM        < end of copied text >

## 2019-11-14 DIAGNOSIS — Z71.89 OTHER SPECIFIED COUNSELING: ICD-10-CM

## 2019-11-14 PROBLEM — Z78.9 OTHER SPECIFIED HEALTH STATUS: Chronic | Status: INACTIVE | Noted: 2019-05-23 | Resolved: 2019-11-12

## 2019-11-14 LAB
ALBUMIN SERPL ELPH-MCNC: 2.9 G/DL — LOW (ref 3.5–5)
ALP SERPL-CCNC: 46 U/L — SIGNIFICANT CHANGE UP (ref 40–120)
ALT FLD-CCNC: 38 U/L DA — SIGNIFICANT CHANGE UP (ref 10–60)
ANION GAP SERPL CALC-SCNC: 4 MMOL/L — LOW (ref 5–17)
AST SERPL-CCNC: 36 U/L — SIGNIFICANT CHANGE UP (ref 10–40)
BILIRUB SERPL-MCNC: 0.8 MG/DL — SIGNIFICANT CHANGE UP (ref 0.2–1.2)
BUN SERPL-MCNC: 8 MG/DL — SIGNIFICANT CHANGE UP (ref 7–18)
CALCIUM SERPL-MCNC: 9.1 MG/DL — SIGNIFICANT CHANGE UP (ref 8.4–10.5)
CHLORIDE SERPL-SCNC: 106 MMOL/L — SIGNIFICANT CHANGE UP (ref 96–108)
CO2 SERPL-SCNC: 27 MMOL/L — SIGNIFICANT CHANGE UP (ref 22–31)
CREAT SERPL-MCNC: 0.53 MG/DL — SIGNIFICANT CHANGE UP (ref 0.5–1.3)
GLUCOSE SERPL-MCNC: 90 MG/DL — SIGNIFICANT CHANGE UP (ref 70–99)
HCT VFR BLD CALC: 40.1 % — SIGNIFICANT CHANGE UP (ref 34.5–45)
HGB BLD-MCNC: 13.4 G/DL — SIGNIFICANT CHANGE UP (ref 11.5–15.5)
MAGNESIUM SERPL-MCNC: 2 MG/DL — SIGNIFICANT CHANGE UP (ref 1.6–2.6)
MCHC RBC-ENTMCNC: 30.5 PG — SIGNIFICANT CHANGE UP (ref 27–34)
MCHC RBC-ENTMCNC: 33.4 GM/DL — SIGNIFICANT CHANGE UP (ref 32–36)
MCV RBC AUTO: 91.3 FL — SIGNIFICANT CHANGE UP (ref 80–100)
NRBC # BLD: 0 /100 WBCS — SIGNIFICANT CHANGE UP (ref 0–0)
PHOSPHATE SERPL-MCNC: 3 MG/DL — SIGNIFICANT CHANGE UP (ref 2.5–4.5)
PLATELET # BLD AUTO: 254 K/UL — SIGNIFICANT CHANGE UP (ref 150–400)
POTASSIUM SERPL-MCNC: 3.9 MMOL/L — SIGNIFICANT CHANGE UP (ref 3.5–5.3)
POTASSIUM SERPL-SCNC: 3.9 MMOL/L — SIGNIFICANT CHANGE UP (ref 3.5–5.3)
PROT SERPL-MCNC: 6.4 G/DL — SIGNIFICANT CHANGE UP (ref 6–8.3)
RBC # BLD: 4.39 M/UL — SIGNIFICANT CHANGE UP (ref 3.8–5.2)
RBC # FLD: 13 % — SIGNIFICANT CHANGE UP (ref 10.3–14.5)
SODIUM SERPL-SCNC: 137 MMOL/L — SIGNIFICANT CHANGE UP (ref 135–145)
WBC # BLD: 8.19 K/UL — SIGNIFICANT CHANGE UP (ref 3.8–10.5)
WBC # FLD AUTO: 8.19 K/UL — SIGNIFICANT CHANGE UP (ref 3.8–10.5)

## 2019-11-14 PROCEDURE — 95819 EEG AWAKE AND ASLEEP: CPT | Mod: 26

## 2019-11-14 RX ORDER — ZOLPIDEM TARTRATE 10 MG/1
5 TABLET ORAL AT BEDTIME
Refills: 0 | Status: DISCONTINUED | OUTPATIENT
Start: 2019-11-14 | End: 2019-11-15

## 2019-11-14 RX ORDER — ALPRAZOLAM 0.25 MG
0.5 TABLET ORAL ONCE
Refills: 0 | Status: DISCONTINUED | OUTPATIENT
Start: 2019-11-14 | End: 2019-11-14

## 2019-11-14 RX ORDER — CITALOPRAM 10 MG/1
10 TABLET, FILM COATED ORAL DAILY
Refills: 0 | Status: DISCONTINUED | OUTPATIENT
Start: 2019-11-14 | End: 2019-11-15

## 2019-11-14 RX ADMIN — Medication 105 MILLIGRAM(S): at 17:54

## 2019-11-14 RX ADMIN — Medication 0.5 MILLIGRAM(S): at 15:58

## 2019-11-14 RX ADMIN — SODIUM CHLORIDE 100 MILLILITER(S): 9 INJECTION, SOLUTION INTRAVENOUS at 13:56

## 2019-11-14 RX ADMIN — Medication 2 MILLIGRAM(S): at 06:39

## 2019-11-14 RX ADMIN — Medication 105 MILLIGRAM(S): at 06:39

## 2019-11-14 RX ADMIN — Medication 2 MILLIGRAM(S): at 03:14

## 2019-11-14 RX ADMIN — ZOLPIDEM TARTRATE 5 MILLIGRAM(S): 10 TABLET ORAL at 21:50

## 2019-11-14 RX ADMIN — PANTOPRAZOLE SODIUM 40 MILLIGRAM(S): 20 TABLET, DELAYED RELEASE ORAL at 11:54

## 2019-11-14 RX ADMIN — Medication 105 MILLIGRAM(S): at 23:18

## 2019-11-14 RX ADMIN — Medication 2 MILLIGRAM(S): at 11:54

## 2019-11-14 NOTE — PROGRESS NOTE ADULT - SUBJECTIVE AND OBJECTIVE BOX
HPI:  Patient is a 35 y/o F with PMH of depression, ETOH use comes in with witnessed seizure by spouse.   per EMS report pt had one tonic clonic seizure witnessed by  likely alcohol withdrawal seizure as pt drank alcohol for 4-5 days and stopped it, at the arrival of EMS pt was combative and was yelling, fighting and splitting, as pt was high risk to harm her self and others pt was given midazolam 10mg IM and pt was brought to the ED. In the ED pt got ativan, benadryl and haldol on examination pt was sedated. Responsive to verbal stimuli. Per ED note pt drinks 1 gallon of tequila daily. Pt was recently admitted to the [facility] ICU for management of alcohol withdrawal requiring Precedex. Currently pt is not able to provide HPI. (12 Nov 2019 22:21)      Patient is a 34y old  Female who presents with a chief complaint of Seizure (13 Nov 2019 14:02)      INTERVAL HPI/OVERNIGHT EVENTS:  T(C): 36.8 (11-14-19 @ 11:03), Max: 36.8 (11-13-19 @ 15:52)  HR: 95 (11-14-19 @ 11:03) (71 - 97)  BP: 111/97 (11-14-19 @ 11:03) (111/97 - 152/78)  RR: 18 (11-14-19 @ 11:03) (17 - 18)  SpO2: 98% (11-14-19 @ 11:03) (97% - 100%)  Wt(kg): --  I&O's Summary    13 Nov 2019 07:01  -  14 Nov 2019 07:00  --------------------------------------------------------  IN: 700 mL / OUT: 0 mL / NET: 700 mL        REVIEW OF SYSTEMS: denies fever, chills, SOB, palpitations, chest pain, abdominal pain, nausea, vomitting, diarrhea, constipation, dizziness    MEDICATIONS  (STANDING):  influenza   Vaccine 0.5 milliLiter(s) IntraMuscular once  lactated ringers. 1000 milliLiter(s) (100 mL/Hr) IV Continuous <Continuous>  LORazepam   Injectable 2 milliGRAM(s) IV Push every 4 hours  multivitamin/thiamine/folic acid in sodium chloride 0.9% 1000 milliLiter(s) (100 mL/Hr) IV Continuous <Continuous>  pantoprazole  Injectable 40 milliGRAM(s) IV Push daily  thiamine IVPB 500 milliGRAM(s) IV Intermittent every 8 hours    MEDICATIONS  (PRN):  LORazepam   Injectable 2 milliGRAM(s) IV Push every 2 hours PRN CIWA-Ar score 8 or greater  LORazepam   Injectable 2 milliGRAM(s) IV Push every 2 hours PRN for break through seizure      PHYSICAL EXAM:  GENERAL: NAD, well-groomed, well-developed  HEAD:  Atraumatic, Normocephalic  EYES: EOMI, PERRLA, conjunctiva and sclera clear  ENMT: No tonsillar erythema, exudates, or enlargement; Moist mucous membranes, Good dentition, No lesions  NECK: Supple, No JVD, Normal thyroid  NERVOUS SYSTEM:  Alert & Oriented X3, Good concentration; Motor Strength 5/5 B/L upper and lower extremities; DTRs 2+ intact and symmetric  CHEST/LUNG: Clear to percussion bilaterally; No rales, rhonchi, wheezing, or rubs  HEART: Regular rate and rhythm; No murmurs, rubs, or gallops  ABDOMEN: Soft, Nontender, Nondistended; Bowel sounds present  EXTREMITIES:  2+ Peripheral Pulses, No clubbing, cyanosis, or edema  LYMPH: No lymphadenopathy noted  SKIN: No rashes or lesions  LABS:                        13.4   8.19  )-----------( 254      ( 14 Nov 2019 07:41 )             40.1     11-14    137  |  106  |  8   ----------------------------<  90  3.9   |  27  |  0.53    Ca    9.1      14 Nov 2019 07:41  Phos  3.0     11-14  Mg     2.0     11-14    TPro  6.4  /  Alb  2.9<L>  /  TBili  0.8  /  DBili  x   /  AST  36  /  ALT  38  /  AlkPhos  46  11-14        CAPILLARY BLOOD GLUCOSE

## 2019-11-14 NOTE — EEG REPORT - NS EEG TEXT BOX
EEG REPORT:   EEG Report:  · EEG Report		    [facility]  Report of Routine EEG     [facility]: [address], [address] [place] [address], Phone: [phone]  [facility]: [address], [place] [address], Phone: [phone]  Office: [address] [place] [address], [place] [address], [place] [address], Phone: [phone]    Patient Name: KENDRICK SANCHEZ    Age: 34 year  : 1985  Patient ID: -, MRN #: [id number],   Study Date: 2019		    Technical Information:					  On Instrument: -  Placement and Labeling of Electrodes:  The EEG was performed utilizing 20 channels referential EEG connections (coronal over temporal over parasagittal montage) using all standard 10-20 electrode placements with EKG.  Recording was at a sampling rate of 256 samples per second per channel.  Edward and seizure detection algorithms were utilized.    History:  Routine study..Performed bedside  Patient awake..agitated..uncooperative  33 Y/O female presents with alcohol abuse..ETOH..agitattion..   Concern for seizure    Medication	  Ativan (Lorazepam)	    Study Interpretation:    Findings: The background was continuous, spontaneously variable and reactive. During wakefulness, the posterior dominant rhythm consisted of symmetric, well-modulated 8.5 Hz activity, with amplitude to 30-40 uV, that attenuated to eye opening.  Low amplitude frontal beta was noted in wakefulness.    Background Slowing:  None    Focal Slowing:   None were present.    Sleep Background:  Drowsiness was characterized by fragmentation, attenuation, and slowing of the background activity.    Sleep was characterized by the presence of vertex waves, symmetric spindles and K-complexes.    Other Non-Epileptiform Findings:  Diffuse excess beta activity.    Interictal Epileptiform Activity:   None were present.    Events:  Clinical events: None recorded.  Seizures: None recorded.    Activation Procedures:   Hyperventilation was not performed.    Photic stimulation was not performed.    Artifacts:  Intermittent myogenic and movement artifacts were noted.    ECG:  The heart rate on single channel ECG was predominantly between 66 and 72 BPM.    EEG Summary/Classification:  Normal EEG in the awake state.  - Diffuse beta activity    EEG Impression/Clinical Correlate:    Diffuse excess beta activity may be seen with medication use such as benzodiazepines or barbiturates  No evidence of seizure tendency was identified.

## 2019-11-14 NOTE — PROGRESS NOTE ADULT - ASSESSMENT
seen and examined  vsstbale afebrile  physical unchaged  awake comfortable ao x 3  no tremers  labs noted    as per neuro needs eeg ( donE)   MRI pending  depression  but not suicidal, or homicidal depression  psych seen and examined  vsstbale afebrile  physical unchaged  awake comfortable ao x 3  no tremers  labs noted    as per neuro needs eeg ( donE)   MRI pending  pt is crying  looks depressed   was on celexa 20 mg daily  depression  but not suicidal, or homicidal depression  psych

## 2019-11-14 NOTE — PROGRESS NOTE ADULT - PROBLEM SELECTOR PLAN 3
pt si on ssris at home   pt seems to be depressed as is seen to be continuously crying  was on celexa 20 mg daily  psych consulted

## 2019-11-14 NOTE — PROGRESS NOTE ADULT - PROBLEM SELECTOR PLAN 1
likely alcohol withdrawal seizure as pt suddenly stopped alcohol   CT head neg, no hypoglycemia, no electrolytes abnormality   s/p 10mg midazolam, ativan, haldol, benadryl   EKG sinus tachy  will monitor on ciwa protocol   ciwa zero this morning  ativan 2 q4 prn + ativan 2 q2 prn for break trough seizure   banana bag, thiamine   had eeg on last admission - Generalized background slowing and Diffuse beta activity  EEG today  f/u MR head  monitor for signs of DTs  neurology consulted  seizure precaution, aspiration precaution

## 2019-11-14 NOTE — SBIRT NOTE ADULT - NSSBIRTOFTENALCOHOL_GEN_A_CORE
every other day, after work   states the weekends is where she picks up drinking or when she has a day off/4 or more times a week

## 2019-11-14 NOTE — PROGRESS NOTE ADULT - SUBJECTIVE AND OBJECTIVE BOX
PGY 1 Note discussed with supervising resident and primary attending    Patient is a 34y old  Female who presents with a chief complaint of Seizure (14 Nov 2019 13:28)      INTERVAL HPI/OVERNIGHT EVENTS: Patient seen and examined at the bedside. Pt is continuously crying and saying she is anxious.     MEDICATIONS  (STANDING):  ALPRAZolam 0.5 milliGRAM(s) Oral once  influenza   Vaccine 0.5 milliLiter(s) IntraMuscular once  lactated ringers. 1000 milliLiter(s) (100 mL/Hr) IV Continuous <Continuous>  LORazepam   Injectable 2 milliGRAM(s) IV Push every 4 hours  multivitamin/thiamine/folic acid in sodium chloride 0.9% 1000 milliLiter(s) (100 mL/Hr) IV Continuous <Continuous>  pantoprazole  Injectable 40 milliGRAM(s) IV Push daily  thiamine IVPB 500 milliGRAM(s) IV Intermittent every 8 hours    MEDICATIONS  (PRN):  LORazepam   Injectable 2 milliGRAM(s) IV Push every 2 hours PRN CIWA-Ar score 8 or greater  LORazepam   Injectable 2 milliGRAM(s) IV Push every 2 hours PRN for break through seizure      __________________________________________________  REVIEW OF SYSTEMS:    CONSTITUTIONAL: No fever,   EYES: no acute visual disturbances  NECK: No pain or stiffness  RESPIRATORY: No cough; No shortness of breath  CARDIOVASCULAR: No chest pain, no palpitations  GASTROINTESTINAL: No pain. No nausea or vomiting; No diarrhea   NEUROLOGICAL: No headache or numbness, no tremors  MUSCULOSKELETAL: No joint pain, no muscle pain  GENITOURINARY: no dysuria, no frequency, no hesitancy  PSYCHIATRY: no depression , no anxiety  ALL OTHER  ROS negative        Vital Signs Last 24 Hrs  T(C): 36.8 (14 Nov 2019 11:03), Max: 36.8 (13 Nov 2019 15:52)  T(F): 98.2 (14 Nov 2019 11:03), Max: 98.2 (13 Nov 2019 15:52)  HR: 95 (14 Nov 2019 11:03) (71 - 97)  BP: 111/97 (14 Nov 2019 11:03) (111/97 - 152/78)  BP(mean): --  RR: 18 (14 Nov 2019 11:03) (17 - 18)  SpO2: 98% (14 Nov 2019 11:03) (97% - 100%)    ________________________________________________  PHYSICAL EXAM:  GENERAL: NAD  HEENT: Normocephalic;  conjunctivae and sclerae clear; moist mucous membranes;   NECK : supple  CHEST/LUNG: Clear to auscultation bilaterally with good air entry   HEART: S1 S2  regular; no murmurs, gallops or rubs  ABDOMEN: Soft, Nontender, Nondistended; Bowel sounds present  EXTREMITIES: no cyanosis; no edema; no calf tenderness  SKIN: warm and dry; no rash  NERVOUS SYSTEM:  Awake and alert; Oriented  to place, person and time ; no new deficits    _________________________________________________  LABS:                        13.4   8.19  )-----------( 254      ( 14 Nov 2019 07:41 )             40.1     11-14    137  |  106  |  8   ----------------------------<  90  3.9   |  27  |  0.53    Ca    9.1      14 Nov 2019 07:41  Phos  3.0     11-14  Mg     2.0     11-14    TPro  6.4  /  Alb  2.9<L>  /  TBili  0.8  /  DBili  x   /  AST  36  /  ALT  38  /  AlkPhos  46  11-14        CAPILLARY BLOOD GLUCOSE            RADIOLOGY & ADDITIONAL TESTS:          EEG Summary/Classification:  Normal EEG in the awake state.  - Diffuse beta activity    EEG Impression/Clinical Correlate:    Diffuse excess beta activity may be seen with medication use such as benzodiazepines or barbiturates  No evidence of seizure tendency was identified.Imaging Personally Reviewed:  YES/NO    Consultant(s) Notes Reviewed:   YES/ No    Care Discussed with Consultants :     Plan of care was discussed with patient and /or primary care giver; all questions and concerns were addressed and care was aligned with patient's wishes.

## 2019-11-15 VITALS
RESPIRATION RATE: 18 BRPM | SYSTOLIC BLOOD PRESSURE: 145 MMHG | OXYGEN SATURATION: 98 % | TEMPERATURE: 98 F | DIASTOLIC BLOOD PRESSURE: 80 MMHG | HEART RATE: 71 BPM

## 2019-11-15 LAB
ALBUMIN SERPL ELPH-MCNC: 3.1 G/DL — LOW (ref 3.5–5)
ALP SERPL-CCNC: 48 U/L — SIGNIFICANT CHANGE UP (ref 40–120)
ALT FLD-CCNC: 69 U/L DA — HIGH (ref 10–60)
ANION GAP SERPL CALC-SCNC: 5 MMOL/L — SIGNIFICANT CHANGE UP (ref 5–17)
AST SERPL-CCNC: 50 U/L — HIGH (ref 10–40)
BILIRUB SERPL-MCNC: 0.5 MG/DL — SIGNIFICANT CHANGE UP (ref 0.2–1.2)
BUN SERPL-MCNC: 9 MG/DL — SIGNIFICANT CHANGE UP (ref 7–18)
CALCIUM SERPL-MCNC: 8.9 MG/DL — SIGNIFICANT CHANGE UP (ref 8.4–10.5)
CHLORIDE SERPL-SCNC: 108 MMOL/L — SIGNIFICANT CHANGE UP (ref 96–108)
CO2 SERPL-SCNC: 26 MMOL/L — SIGNIFICANT CHANGE UP (ref 22–31)
CREAT SERPL-MCNC: 0.53 MG/DL — SIGNIFICANT CHANGE UP (ref 0.5–1.3)
DRUG SCREEN, SERUM: SIGNIFICANT CHANGE UP
GLUCOSE SERPL-MCNC: 95 MG/DL — SIGNIFICANT CHANGE UP (ref 70–99)
HCT VFR BLD CALC: 41 % — SIGNIFICANT CHANGE UP (ref 34.5–45)
HGB BLD-MCNC: 13.7 G/DL — SIGNIFICANT CHANGE UP (ref 11.5–15.5)
MAGNESIUM SERPL-MCNC: 1.9 MG/DL — SIGNIFICANT CHANGE UP (ref 1.6–2.6)
MCHC RBC-ENTMCNC: 31 PG — SIGNIFICANT CHANGE UP (ref 27–34)
MCHC RBC-ENTMCNC: 33.4 GM/DL — SIGNIFICANT CHANGE UP (ref 32–36)
MCV RBC AUTO: 92.8 FL — SIGNIFICANT CHANGE UP (ref 80–100)
NRBC # BLD: 0 /100 WBCS — SIGNIFICANT CHANGE UP (ref 0–0)
PHOSPHATE SERPL-MCNC: 3.3 MG/DL — SIGNIFICANT CHANGE UP (ref 2.5–4.5)
PLATELET # BLD AUTO: 241 K/UL — SIGNIFICANT CHANGE UP (ref 150–400)
POTASSIUM SERPL-MCNC: 4.3 MMOL/L — SIGNIFICANT CHANGE UP (ref 3.5–5.3)
POTASSIUM SERPL-SCNC: 4.3 MMOL/L — SIGNIFICANT CHANGE UP (ref 3.5–5.3)
PROT SERPL-MCNC: 6.7 G/DL — SIGNIFICANT CHANGE UP (ref 6–8.3)
RBC # BLD: 4.42 M/UL — SIGNIFICANT CHANGE UP (ref 3.8–5.2)
RBC # FLD: 13 % — SIGNIFICANT CHANGE UP (ref 10.3–14.5)
SODIUM SERPL-SCNC: 139 MMOL/L — SIGNIFICANT CHANGE UP (ref 135–145)
WBC # BLD: 7.53 K/UL — SIGNIFICANT CHANGE UP (ref 3.8–10.5)
WBC # FLD AUTO: 7.53 K/UL — SIGNIFICANT CHANGE UP (ref 3.8–10.5)

## 2019-11-15 PROCEDURE — 82550 ASSAY OF CK (CPK): CPT

## 2019-11-15 PROCEDURE — 84100 ASSAY OF PHOSPHORUS: CPT

## 2019-11-15 PROCEDURE — 85027 COMPLETE CBC AUTOMATED: CPT

## 2019-11-15 PROCEDURE — 93005 ELECTROCARDIOGRAM TRACING: CPT

## 2019-11-15 PROCEDURE — 82962 GLUCOSE BLOOD TEST: CPT

## 2019-11-15 PROCEDURE — 96372 THER/PROPH/DIAG INJ SC/IM: CPT

## 2019-11-15 PROCEDURE — 95819 EEG AWAKE AND ASLEEP: CPT

## 2019-11-15 PROCEDURE — 80048 BASIC METABOLIC PNL TOTAL CA: CPT

## 2019-11-15 PROCEDURE — 99285 EMERGENCY DEPT VISIT HI MDM: CPT | Mod: 25

## 2019-11-15 PROCEDURE — 84702 CHORIONIC GONADOTROPIN TEST: CPT

## 2019-11-15 PROCEDURE — 36415 COLL VENOUS BLD VENIPUNCTURE: CPT

## 2019-11-15 PROCEDURE — 80053 COMPREHEN METABOLIC PANEL: CPT

## 2019-11-15 PROCEDURE — 80307 DRUG TEST PRSMV CHEM ANLYZR: CPT

## 2019-11-15 PROCEDURE — 83735 ASSAY OF MAGNESIUM: CPT

## 2019-11-15 PROCEDURE — 95957 EEG DIGITAL ANALYSIS: CPT

## 2019-11-15 PROCEDURE — 70450 CT HEAD/BRAIN W/O DYE: CPT

## 2019-11-15 PROCEDURE — 99232 SBSQ HOSP IP/OBS MODERATE 35: CPT

## 2019-11-15 PROCEDURE — 70551 MRI BRAIN STEM W/O DYE: CPT

## 2019-11-15 PROCEDURE — 80076 HEPATIC FUNCTION PANEL: CPT

## 2019-11-15 PROCEDURE — 70551 MRI BRAIN STEM W/O DYE: CPT | Mod: 26

## 2019-11-15 RX ORDER — ACETAMINOPHEN 500 MG
650 TABLET ORAL EVERY 6 HOURS
Refills: 0 | Status: DISCONTINUED | OUTPATIENT
Start: 2019-11-15 | End: 2019-11-15

## 2019-11-15 RX ADMIN — CITALOPRAM 10 MILLIGRAM(S): 10 TABLET, FILM COATED ORAL at 12:01

## 2019-11-15 RX ADMIN — PANTOPRAZOLE SODIUM 40 MILLIGRAM(S): 20 TABLET, DELAYED RELEASE ORAL at 11:55

## 2019-11-15 RX ADMIN — Medication 105 MILLIGRAM(S): at 07:01

## 2019-11-15 NOTE — DISCHARGE NOTE PROVIDER - NSDCCPCAREPLAN_GEN_ALL_CORE_FT
PRINCIPAL DISCHARGE DIAGNOSIS  Diagnosis: Alcohol withdrawal seizure without complication  Assessment and Plan of Treatment: You presented after having a seizure after drinking. Blood alochol levels were elevated. CT head was negative. You were given hyration and thiamine and monitored. EEG was done which showed generalized background slowing and diffuse beta activity which can be seen with drinking. Neurology was consulted. You got an MRI of the head. You are now stable for discharge. You were strongly advised to quit alcohol and counselled on its harmful effects. Help was offered to you and referral given. Please follow with your primary doctor and a neurologist after dc in a  week.      SECONDARY DISCHARGE DIAGNOSES  Diagnosis: Depression  Assessment and Plan of Treatment: Please continue your medicine and follow with a psyhciatrist. PRINCIPAL DISCHARGE DIAGNOSIS  Diagnosis: Alcohol withdrawal seizure without complication  Assessment and Plan of Treatment: You presented after having a seizure after drinking. Blood alochol levels were elevated. CT head was negative. You were given hyration and thiamine and monitored. EEG was done which showed generalized background slowing and diffuse beta activity which can be seen with drinking. Neurology was consulted. You got an MRI of the head. You are now stable for discharge. You are advised to not drive for a year after seizure. You did not receive any ativan within the last 24 hours. You were strongly advised to quit alcohol and counselled on its harmful effects. Help was offered to you and referral given. Please follow with your primary doctor and a neurologist after dc in a  week.      SECONDARY DISCHARGE DIAGNOSES  Diagnosis: Depression  Assessment and Plan of Treatment: Please continue your medicine and follow with a psyhciatrist.

## 2019-11-15 NOTE — DISCHARGE NOTE PROVIDER - NSDCMRMEDTOKEN_GEN_ALL_CORE_FT
citalopram 20 mg oral tablet: 1 tab(s) orally once a day  folic acid 0.4 mg oral tablet: 1 tab(s) orally once a day   thiamine 100 mg oral tablet: 1 tab(s) orally once a day

## 2019-11-15 NOTE — DISCHARGE NOTE PROVIDER - HOSPITAL COURSE
Patient is a 33 y/o F with PMH of depression, ETOH use comes in with witnessed seizure by spouse.     per EMS report pt had one tonic clonic seizure witnessed by  likely alcohol withdrawal seizure as pt drank alcohol for 4-5 days and stopped it, at the arrival of EMS pt was combative and was yelling, fighting and splitting, as pt was high risk to harm her self and others pt was given midazolam 10mg IM and pt was brought to the ED. In the ED pt got ativan, benadryl and haldol on examination pt was sedated. Responsive to verbal stimuli. Per ED note pt drinks 1 gallon of tequila daily. Pt was recently admitted to the [facility] ICU for management of alcohol withdrawal requiring Precedex. Currently pt is not able to provide HPI.           Pt was admitted for alcohol  intoxication. Blood alcohol  levels were 400.CTH was negative. Neuro was consulted. MR brain was done. Psych was also consulted given patient's h/o depression.    Patient is stable for discharge per attending and is advised to follow up with PCP as outpatient    Please refer to patient's complete medical chart with documents for a full hospital course, for this is only a brief summary. Patient is a 35 y/o F with PMH of depression, ETOH use comes in with witnessed seizure by spouse.     per EMS report pt had one tonic clonic seizure witnessed by  likely alcohol withdrawal seizure as pt drank alcohol for 4-5 days and stopped it, at the arrival of EMS pt was combative and was yelling, fighting and splitting, as pt was high risk to harm her self and others pt was given midazolam 10mg IM and pt was brought to the ED. In the ED pt got ativan, benadryl and haldol on examination pt was sedated. Responsive to verbal stimuli. Per ED note pt drinks 1 gallon of tequila daily. Pt was recently admitted to the [facility] ICU for management of alcohol withdrawal requiring Precedex. Currently pt is not able to provide HPI.           Pt was admitted for alcohol  intoxication. Blood alcohol  levels were 400.CTH was negative. Neuro was consulted. MR brain was done which was normal. Psych was also consulted given patient's h/o depression.    Patient is stable for discharge per attending and is advised to follow up with PCP as outpatient    Please refer to patient's complete medical chart with documents for a full hospital course, for this is only a brief summary.

## 2019-11-15 NOTE — PROGRESS NOTE ADULT - SUBJECTIVE AND OBJECTIVE BOX
HPI:  Patient is a 33 y/o F with PMH of depression, ETOH use comes in with witnessed seizure by spouse.   per EMS report pt had one tonic clonic seizure witnessed by  likely alcohol withdrawal seizure as pt drank alcohol for 4-5 days and stopped it, at the arrival of EMS pt was combative and was yelling, fighting and splitting, as pt was high risk to harm her self and others pt was given midazolam 10mg IM and pt was brought to the ED. In the ED pt got ativan, benadryl and haldol on examination pt was sedated. Responsive to verbal stimuli. Per ED note pt drinks 1 gallon of tequila daily. Pt was recently admitted to the [facility] ICU for management of alcohol withdrawal requiring Precedex. Currently pt is not able to provide HPI. (12 Nov 2019 22:21)      Patient is a 34y old  Female who presents with a chief complaint of Seizure (15 Nov 2019 15:11)      INTERVAL HPI/OVERNIGHT EVENTS:  T(C): 36.5 (11-15-19 @ 11:28), Max: 36.9 (11-14-19 @ 19:20)  HR: 71 (11-15-19 @ 11:28) (64 - 82)  BP: 145/80 (11-15-19 @ 11:28) (121/74 - 152/86)  RR: 18 (11-15-19 @ 11:28) (18 - 18)  SpO2: 98% (11-15-19 @ 11:28) (97% - 100%)  Wt(kg): --  I&O's Summary    14 Nov 2019 07:01  -  15 Nov 2019 07:00  --------------------------------------------------------  IN: 300 mL / OUT: 0 mL / NET: 300 mL        REVIEW OF SYSTEMS: denies fever, chills, SOB, palpitations, chest pain, abdominal pain, nausea, vomitting, diarrhea, constipation, dizziness    MEDICATIONS  (STANDING):  citalopram 10 milliGRAM(s) Oral daily  influenza   Vaccine 0.5 milliLiter(s) IntraMuscular once  lactated ringers. 1000 milliLiter(s) (100 mL/Hr) IV Continuous <Continuous>  multivitamin/thiamine/folic acid in sodium chloride 0.9% 1000 milliLiter(s) (100 mL/Hr) IV Continuous <Continuous>  pantoprazole  Injectable 40 milliGRAM(s) IV Push daily  thiamine IVPB 500 milliGRAM(s) IV Intermittent every 8 hours    MEDICATIONS  (PRN):  acetaminophen   Tablet .. 650 milliGRAM(s) Oral every 6 hours PRN Mild Pain (1 - 3)  LORazepam   Injectable 2 milliGRAM(s) IV Push every 2 hours PRN for break through seizure  LORazepam   Injectable 2 milliGRAM(s) IV Push every 4 hours PRN Agitation  zolpidem 5 milliGRAM(s) Oral at bedtime PRN Insomnia      PHYSICAL EXAM:  GENERAL: NAD, well-groomed, well-developed  HEAD:  Atraumatic, Normocephalic  EYES: EOMI, PERRLA, conjunctiva and sclera clear  ENMT: No tonsillar erythema, exudates, or enlargement; Moist mucous membranes, Good dentition, No lesions  NECK: Supple, No JVD, Normal thyroid  NERVOUS SYSTEM:  Alert & Oriented X3, Good concentration; Motor Strength 5/5 B/L upper and lower extremities; DTRs 2+ intact and symmetric  CHEST/LUNG: Clear to percussion bilaterally; No rales, rhonchi, wheezing, or rubs  HEART: Regular rate and rhythm; No murmurs, rubs, or gallops  ABDOMEN: Soft, Nontender, Nondistended; Bowel sounds present  EXTREMITIES:  2+ Peripheral Pulses, No clubbing, cyanosis, or edema  LYMPH: No lymphadenopathy noted  SKIN: No rashes or lesions  LABS:                        13.7   7.53  )-----------( 241      ( 15 Nov 2019 07:37 )             41.0     11-15    139  |  108  |  9   ----------------------------<  95  4.3   |  26  |  0.53    Ca    8.9      15 Nov 2019 07:37  Phos  3.3     11-15  Mg     1.9     11-15    TPro  6.7  /  Alb  3.1<L>  /  TBili  0.5  /  DBili  x   /  AST  50<H>  /  ALT  69<H>  /  AlkPhos  48  11-15        CAPILLARY BLOOD GLUCOSE

## 2019-11-15 NOTE — DISCHARGE NOTE NURSING/CASE MANAGEMENT/SOCIAL WORK - PATIENT PORTAL LINK FT
You can access the FollowMyHealth Patient Portal offered by [facility] by registering at the following website: [email or web address]. By joining [facility]’[facility] FollowMyHealth portal, you will also be able to view your health information using other applications (apps) compatible with our system.

## 2019-11-15 NOTE — PROGRESS NOTE ADULT - ASSESSMENT
Alcohol withdraw seizures    Recommend:  cw treatment for alcohol withdraw as per primary team  no driving until seizure free x 1 year as per [facility] law    luly singhnet

## 2019-11-15 NOTE — PROGRESS NOTE ADULT - SUBJECTIVE AND OBJECTIVE BOX
Neurology Follow up note    Name  KENDRICK SANCHEZ    Subjective:  no new neuro symptoms  no further seizures    Review of Systems:  Constitutional:      no fever  Respiratory:                     no cough           MEDICATIONS  (STANDING):  citalopram 10 milliGRAM(s) Oral daily  influenza   Vaccine 0.5 milliLiter(s) IntraMuscular once  lactated ringers. 1000 milliLiter(s) (100 mL/Hr) IV Continuous <Continuous>  multivitamin/thiamine/folic acid in sodium chloride 0.9% 1000 milliLiter(s) (100 mL/Hr) IV Continuous <Continuous>  pantoprazole  Injectable 40 milliGRAM(s) IV Push daily  thiamine IVPB 500 milliGRAM(s) IV Intermittent every 8 hours    MEDICATIONS  (PRN):  acetaminophen   Tablet .. 650 milliGRAM(s) Oral every 6 hours PRN Mild Pain (1 - 3)  LORazepam   Injectable 2 milliGRAM(s) IV Push every 2 hours PRN for break through seizure  LORazepam   Injectable 2 milliGRAM(s) IV Push every 4 hours PRN Agitation  zolpidem 5 milliGRAM(s) Oral at bedtime PRN Insomnia      Allergies    No Known Allergies    Intolerances        Objective:   Vital Signs Last 24 Hrs  T(C): 36.5 (15 Nov 2019 11:28), Max: 36.9 (14 Nov 2019 19:20)  T(F): 97.7 (15 Nov 2019 11:28), Max: 98.5 (14 Nov 2019 19:20)  HR: 71 (15 Nov 2019 11:28) (64 - 82)  BP: 145/80 (15 Nov 2019 11:28) (121/74 - 152/86)  BP(mean): --  RR: 18 (15 Nov 2019 11:28) (18 - 18)  SpO2: 98% (15 Nov 2019 11:28) (97% - 100%)    General Exam:   General appearance: No acute distress                   Neurological Exam:  Mental Status: Orientated to self, date and place.  Attention intact.  No dysarthria, aphasia or neglect.      Cranial Nerves: CN I - not tested.  PERRL, EOMI, VFF  No facial asymmetry.  Hearing intact to finger rub bilaterally.  Tongue, uvula and palate midline.  Sternocleidomastoid and Trapezius intact bilaterally.    Other:    11-15    139  |  108  |  9   ----------------------------<  95  4.3   |  26  |  0.53    Ca    8.9      15 Nov 2019 07:37  Phos  3.3     11-15  Mg     1.9     11-15    TPro  6.7  /  Alb  3.1<L>  /  TBili  0.5  /  DBili  x   /  AST  50<H>  /  ALT  69<H>  /  AlkPhos  48  11-15    11-15    139  |  108  |  9   ----------------------------<  95  4.3   |  26  |  0.53    Ca    8.9      15 Nov 2019 07:37  Phos  3.3     11-15  Mg     1.9     11-15    TPro  6.7  /  Alb  3.1<L>  /  TBili  0.5  /  DBili  x   /  AST  50<H>  /  ALT  69<H>  /  AlkPhos  48  11-15    LIVER FUNCTIONS - ( 15 Nov 2019 07:37 )  Alb: 3.1 g/dL / Pro: 6.7 g/dL / ALK PHOS: 48 U/L / ALT: 69 U/L DA / AST: 50 U/L / GGT: x             Radiology      EEG Impression/Clinical Correlate:    Diffuse excess beta activity may be seen with medication use such as benzodiazepines or barbiturates  No evidence of seizure tendency was identified.    < from: MR Head No Cont (11.15.19 @ 10:56) >    EXAM:  MR BRAIN                            PROCEDURE DATE:  11/15/2019          INTERPRETATION:  MRI brain without contrast    History seizure, alcohol withdrawal    Comparison CT performed 3 days ago    There is no mass effect, cortical edema or hydrocephalus. Cortical volume   and white matter signal are preserved. There is no evidence of acute   infarct or previous parenchymal hemorrhage. The orbital and sellar   contents and cerebellar tonsils are within normal limits. The medial   temporallobes are symmetric in volume and signal.    IMPRESSION:    Normal brain                PAULINE GOMEZ M.D., ATTENDING RADIOLOGIST  This document has been electronically signed. Nov 15 2019 11:39AM        < end of copied text >

## 2019-11-15 NOTE — PROGRESS NOTE ADULT - ASSESSMENT
seen and examined vs stable afebrile physical unchanged    no cp or sob or palp.  no seizure noted    no headache  alert awake non focal   labs noted  eeg   resident d/w neuro  ok to be d/cd  mri  nml  etoh, first episode of seizure  d/c home with PCP, neuro, psych as out pt  yesterday psych called me that consult is not necessary.

## 2019-11-18 ENCOUNTER — INPATIENT (INPATIENT)
Facility: HOSPITAL | Age: 34
LOS: 1 days | Discharge: HOME | DRG: 897 | End: 2019-11-20
Attending: INTERNAL MEDICINE | Admitting: INTERNAL MEDICINE
Payer: MEDICAID

## 2019-11-18 VITALS — WEIGHT: 169.98 LBS | HEIGHT: 67 IN

## 2019-11-18 DIAGNOSIS — F10.239 ALCOHOL DEPENDENCE WITH WITHDRAWAL, UNSPECIFIED: ICD-10-CM

## 2019-11-18 LAB
ALBUMIN SERPL ELPH-MCNC: 3.8 G/DL — SIGNIFICANT CHANGE UP (ref 3.5–5)
ALP SERPL-CCNC: 55 U/L — SIGNIFICANT CHANGE UP (ref 40–120)
ALT FLD-CCNC: 85 U/L DA — HIGH (ref 10–60)
ANION GAP SERPL CALC-SCNC: 12 MMOL/L — SIGNIFICANT CHANGE UP (ref 5–17)
APAP SERPL-MCNC: <2 UG/ML — LOW (ref 10–30)
AST SERPL-CCNC: 34 U/L — SIGNIFICANT CHANGE UP (ref 10–40)
BASOPHILS # BLD AUTO: 0.03 K/UL — SIGNIFICANT CHANGE UP (ref 0–0.2)
BASOPHILS NFR BLD AUTO: 0.3 % — SIGNIFICANT CHANGE UP (ref 0–2)
BILIRUB SERPL-MCNC: 0.2 MG/DL — SIGNIFICANT CHANGE UP (ref 0.2–1.2)
BUN SERPL-MCNC: 8 MG/DL — SIGNIFICANT CHANGE UP (ref 7–18)
CALCIUM SERPL-MCNC: 8.2 MG/DL — LOW (ref 8.4–10.5)
CHLORIDE SERPL-SCNC: 110 MMOL/L — HIGH (ref 96–108)
CO2 SERPL-SCNC: 21 MMOL/L — LOW (ref 22–31)
CREAT SERPL-MCNC: 0.66 MG/DL — SIGNIFICANT CHANGE UP (ref 0.5–1.3)
EOSINOPHIL # BLD AUTO: 0.18 K/UL — SIGNIFICANT CHANGE UP (ref 0–0.5)
EOSINOPHIL NFR BLD AUTO: 1.8 % — SIGNIFICANT CHANGE UP (ref 0–6)
ETHANOL SERPL-MCNC: 456 MG/DL — HIGH (ref 0–10)
GLUCOSE SERPL-MCNC: 104 MG/DL — HIGH (ref 70–99)
HCG SERPL-ACNC: <1 MIU/ML — SIGNIFICANT CHANGE UP
HCT VFR BLD CALC: 46.9 % — HIGH (ref 34.5–45)
HGB BLD-MCNC: 15.6 G/DL — HIGH (ref 11.5–15.5)
IMM GRANULOCYTES NFR BLD AUTO: 0.3 % — SIGNIFICANT CHANGE UP (ref 0–1.5)
LYMPHOCYTES # BLD AUTO: 3.21 K/UL — SIGNIFICANT CHANGE UP (ref 1–3.3)
LYMPHOCYTES # BLD AUTO: 31.6 % — SIGNIFICANT CHANGE UP (ref 13–44)
MCHC RBC-ENTMCNC: 31.1 PG — SIGNIFICANT CHANGE UP (ref 27–34)
MCHC RBC-ENTMCNC: 33.3 GM/DL — SIGNIFICANT CHANGE UP (ref 32–36)
MCV RBC AUTO: 93.4 FL — SIGNIFICANT CHANGE UP (ref 80–100)
MONOCYTES # BLD AUTO: 0.66 K/UL — SIGNIFICANT CHANGE UP (ref 0–0.9)
MONOCYTES NFR BLD AUTO: 6.5 % — SIGNIFICANT CHANGE UP (ref 2–14)
NEUTROPHILS # BLD AUTO: 6.04 K/UL — SIGNIFICANT CHANGE UP (ref 1.8–7.4)
NEUTROPHILS NFR BLD AUTO: 59.5 % — SIGNIFICANT CHANGE UP (ref 43–77)
NRBC # BLD: 0 /100 WBCS — SIGNIFICANT CHANGE UP (ref 0–0)
PLATELET # BLD AUTO: 297 K/UL — SIGNIFICANT CHANGE UP (ref 150–400)
POTASSIUM SERPL-MCNC: 3.8 MMOL/L — SIGNIFICANT CHANGE UP (ref 3.5–5.3)
POTASSIUM SERPL-SCNC: 3.8 MMOL/L — SIGNIFICANT CHANGE UP (ref 3.5–5.3)
PROT SERPL-MCNC: 7.9 G/DL — SIGNIFICANT CHANGE UP (ref 6–8.3)
RBC # BLD: 5.02 M/UL — SIGNIFICANT CHANGE UP (ref 3.8–5.2)
RBC # FLD: 13.6 % — SIGNIFICANT CHANGE UP (ref 10.3–14.5)
SALICYLATES SERPL-MCNC: 2 MG/DL — LOW (ref 2.8–20)
SODIUM SERPL-SCNC: 143 MMOL/L — SIGNIFICANT CHANGE UP (ref 135–145)
WBC # BLD: 10.15 K/UL — SIGNIFICANT CHANGE UP (ref 3.8–10.5)
WBC # FLD AUTO: 10.15 K/UL — SIGNIFICANT CHANGE UP (ref 3.8–10.5)

## 2019-11-18 PROCEDURE — 71045 X-RAY EXAM CHEST 1 VIEW: CPT | Mod: 26

## 2019-11-18 PROCEDURE — 99285 EMERGENCY DEPT VISIT HI MDM: CPT

## 2019-11-18 RX ORDER — SODIUM CHLORIDE 9 MG/ML
1000 INJECTION INTRAMUSCULAR; INTRAVENOUS; SUBCUTANEOUS ONCE
Refills: 0 | Status: COMPLETED | OUTPATIENT
Start: 2019-11-18 | End: 2019-11-18

## 2019-11-18 RX ORDER — DIPHENHYDRAMINE HCL 50 MG
50 CAPSULE ORAL ONCE
Refills: 0 | Status: COMPLETED | OUTPATIENT
Start: 2019-11-18 | End: 2019-11-18

## 2019-11-18 RX ORDER — HALOPERIDOL DECANOATE 100 MG/ML
5 INJECTION INTRAMUSCULAR ONCE
Refills: 0 | Status: COMPLETED | OUTPATIENT
Start: 2019-11-18 | End: 2019-11-18

## 2019-11-18 RX ORDER — MIDAZOLAM HYDROCHLORIDE 1 MG/ML
4 INJECTION, SOLUTION INTRAMUSCULAR; INTRAVENOUS ONCE
Refills: 0 | Status: DISCONTINUED | OUTPATIENT
Start: 2019-11-18 | End: 2019-11-18

## 2019-11-18 RX ADMIN — MIDAZOLAM HYDROCHLORIDE 4 MILLIGRAM(S): 1 INJECTION, SOLUTION INTRAMUSCULAR; INTRAVENOUS at 16:52

## 2019-11-18 RX ADMIN — MIDAZOLAM HYDROCHLORIDE 4 MILLIGRAM(S): 1 INJECTION, SOLUTION INTRAMUSCULAR; INTRAVENOUS at 17:17

## 2019-11-18 RX ADMIN — SODIUM CHLORIDE 1000 MILLILITER(S): 9 INJECTION INTRAMUSCULAR; INTRAVENOUS; SUBCUTANEOUS at 22:21

## 2019-11-18 RX ADMIN — Medication 2 MILLIGRAM(S): at 22:20

## 2019-11-18 RX ADMIN — Medication 50 MILLIGRAM(S): at 16:52

## 2019-11-18 RX ADMIN — SODIUM CHLORIDE 1000 MILLILITER(S): 9 INJECTION INTRAMUSCULAR; INTRAVENOUS; SUBCUTANEOUS at 22:02

## 2019-11-18 RX ADMIN — HALOPERIDOL DECANOATE 5 MILLIGRAM(S): 100 INJECTION INTRAMUSCULAR at 16:52

## 2019-11-18 NOTE — ED ADULT NURSE NOTE - OBJECTIVE STATEMENT
brought from home with  for alcohol intoxication , very agitated   , running around in ER , on 1;1 observation since arrival

## 2019-11-18 NOTE — ED PROVIDER NOTE - CLINICAL SUMMARY MEDICAL DECISION MAKING FREE TEXT BOX
Patient presenting with agitation, brought for intox. requred sedation for safety. will obtain lab, monitoring vital, monitor for sobriety

## 2019-11-18 NOTE — ED PROVIDER NOTE - PROGRESS NOTE DETAILS
patient now awake, alert, hr still tachycardic despite fluid. no focal deficit. mild tremor noted to tongue, concern for withdrawel, given ativan, discussed with dr ignacio, admitted for withdrawel

## 2019-11-18 NOTE — ED ADULT NURSE NOTE - ED STAT RN HANDOFF DETAILS 2
report given to WALDO Barron cont. care,  pt sleeping,  not in distress with side rails up on yellow gown , on remote tele monitor.

## 2019-11-18 NOTE — ED ADULT NURSE NOTE - CHIEF COMPLAINT
Chief Complaint   Patient presents with     Urgent Care     UTI     burning and itching in genital area,24 weeks pregnant        Initial /67  Pulse 97  Temp 97.8  F (36.6  C) (Oral)  Wt 210 lb (95.3 kg)  SpO2 99% There is no height or weight on file to calculate BMI.  Medication Reconciliation: complete   Shahrzad THORNE MA       
The patient is a 34y Female complaining of alcohol intoxication.

## 2019-11-18 NOTE — ED PROVIDER NOTE - OBJECTIVE STATEMENT
34 year old female with history of EtOH abuse BIB  for EtOH intoxication today. As per , she has been drinking bottles daily for the past week. On arrival to the ER she is combative. Unable to verbally deescalate. Patient was sedated for prevention of harm to patient and to others. Patient is moving all extremities on arrival.

## 2019-11-19 DIAGNOSIS — F32.9 MAJOR DEPRESSIVE DISORDER, SINGLE EPISODE, UNSPECIFIED: ICD-10-CM

## 2019-11-19 DIAGNOSIS — F10.230 ALCOHOL DEPENDENCE WITH WITHDRAWAL, UNCOMPLICATED: ICD-10-CM

## 2019-11-19 DIAGNOSIS — F10.24 ALCOHOL DEPENDENCE WITH ALCOHOL-INDUCED MOOD DISORDER: ICD-10-CM

## 2019-11-19 DIAGNOSIS — Z29.9 ENCOUNTER FOR PROPHYLACTIC MEASURES, UNSPECIFIED: ICD-10-CM

## 2019-11-19 DIAGNOSIS — E86.0 DEHYDRATION: ICD-10-CM

## 2019-11-19 DIAGNOSIS — F10.929 ALCOHOL USE, UNSPECIFIED WITH INTOXICATION, UNSPECIFIED: ICD-10-CM

## 2019-11-19 PROBLEM — F10.10 ALCOHOL ABUSE, UNCOMPLICATED: Chronic | Status: ACTIVE | Noted: 2019-11-12

## 2019-11-19 LAB
24R-OH-CALCIDIOL SERPL-MCNC: 7.2 NG/ML — LOW (ref 30–80)
ALBUMIN SERPL ELPH-MCNC: 3.2 G/DL — LOW (ref 3.5–5)
ALP SERPL-CCNC: 46 U/L — SIGNIFICANT CHANGE UP (ref 40–120)
ALT FLD-CCNC: 62 U/L DA — HIGH (ref 10–60)
AMYLASE P1 CFR SERPL: 39 U/L — SIGNIFICANT CHANGE UP (ref 25–115)
ANION GAP SERPL CALC-SCNC: 7 MMOL/L — SIGNIFICANT CHANGE UP (ref 5–17)
AST SERPL-CCNC: 30 U/L — SIGNIFICANT CHANGE UP (ref 10–40)
BASOPHILS # BLD AUTO: 0.04 K/UL — SIGNIFICANT CHANGE UP (ref 0–0.2)
BASOPHILS NFR BLD AUTO: 0.4 % — SIGNIFICANT CHANGE UP (ref 0–2)
BILIRUB SERPL-MCNC: 0.4 MG/DL — SIGNIFICANT CHANGE UP (ref 0.2–1.2)
BUN SERPL-MCNC: 9 MG/DL — SIGNIFICANT CHANGE UP (ref 7–18)
CALCIUM SERPL-MCNC: 7.5 MG/DL — LOW (ref 8.4–10.5)
CHLORIDE SERPL-SCNC: 109 MMOL/L — HIGH (ref 96–108)
CHOLEST SERPL-MCNC: 283 MG/DL — HIGH (ref 10–199)
CO2 SERPL-SCNC: 24 MMOL/L — SIGNIFICANT CHANGE UP (ref 22–31)
CREAT SERPL-MCNC: 0.56 MG/DL — SIGNIFICANT CHANGE UP (ref 0.5–1.3)
EOSINOPHIL # BLD AUTO: 0.16 K/UL — SIGNIFICANT CHANGE UP (ref 0–0.5)
EOSINOPHIL NFR BLD AUTO: 1.6 % — SIGNIFICANT CHANGE UP (ref 0–6)
GLUCOSE SERPL-MCNC: 76 MG/DL — SIGNIFICANT CHANGE UP (ref 70–99)
HBA1C BLD-MCNC: 5.4 % — SIGNIFICANT CHANGE UP (ref 4–5.6)
HCT VFR BLD CALC: 39.7 % — SIGNIFICANT CHANGE UP (ref 34.5–45)
HDLC SERPL-MCNC: 38 MG/DL — LOW
HGB BLD-MCNC: 13.1 G/DL — SIGNIFICANT CHANGE UP (ref 11.5–15.5)
IMM GRANULOCYTES NFR BLD AUTO: 0.2 % — SIGNIFICANT CHANGE UP (ref 0–1.5)
LIPID PNL WITH DIRECT LDL SERPL: 135 MG/DL — SIGNIFICANT CHANGE UP
LYMPHOCYTES # BLD AUTO: 3.13 K/UL — SIGNIFICANT CHANGE UP (ref 1–3.3)
LYMPHOCYTES # BLD AUTO: 30.5 % — SIGNIFICANT CHANGE UP (ref 13–44)
MAGNESIUM SERPL-MCNC: 1.7 MG/DL — SIGNIFICANT CHANGE UP (ref 1.6–2.6)
MCHC RBC-ENTMCNC: 30.9 PG — SIGNIFICANT CHANGE UP (ref 27–34)
MCHC RBC-ENTMCNC: 33 GM/DL — SIGNIFICANT CHANGE UP (ref 32–36)
MCV RBC AUTO: 93.6 FL — SIGNIFICANT CHANGE UP (ref 80–100)
MONOCYTES # BLD AUTO: 0.86 K/UL — SIGNIFICANT CHANGE UP (ref 0–0.9)
MONOCYTES NFR BLD AUTO: 8.4 % — SIGNIFICANT CHANGE UP (ref 2–14)
NEUTROPHILS # BLD AUTO: 6.05 K/UL — SIGNIFICANT CHANGE UP (ref 1.8–7.4)
NEUTROPHILS NFR BLD AUTO: 58.9 % — SIGNIFICANT CHANGE UP (ref 43–77)
NRBC # BLD: 0 /100 WBCS — SIGNIFICANT CHANGE UP (ref 0–0)
PHOSPHATE SERPL-MCNC: 2.5 MG/DL — SIGNIFICANT CHANGE UP (ref 2.5–4.5)
PLATELET # BLD AUTO: 237 K/UL — SIGNIFICANT CHANGE UP (ref 150–400)
POTASSIUM SERPL-MCNC: 3.9 MMOL/L — SIGNIFICANT CHANGE UP (ref 3.5–5.3)
POTASSIUM SERPL-SCNC: 3.9 MMOL/L — SIGNIFICANT CHANGE UP (ref 3.5–5.3)
PROT SERPL-MCNC: 6.5 G/DL — SIGNIFICANT CHANGE UP (ref 6–8.3)
RBC # BLD: 4.24 M/UL — SIGNIFICANT CHANGE UP (ref 3.8–5.2)
RBC # FLD: 13.9 % — SIGNIFICANT CHANGE UP (ref 10.3–14.5)
SODIUM SERPL-SCNC: 140 MMOL/L — SIGNIFICANT CHANGE UP (ref 135–145)
TOTAL CHOLESTEROL/HDL RATIO MEASUREMENT: 7.4 RATIO — HIGH (ref 3.3–7.1)
TRIGL SERPL-MCNC: 550 MG/DL — HIGH (ref 10–149)
TSH SERPL-MCNC: 1.03 UU/ML — SIGNIFICANT CHANGE UP (ref 0.34–4.82)
VIT B12 SERPL-MCNC: 420 PG/ML — SIGNIFICANT CHANGE UP (ref 232–1245)
WBC # BLD: 10.26 K/UL — SIGNIFICANT CHANGE UP (ref 3.8–10.5)
WBC # FLD AUTO: 10.26 K/UL — SIGNIFICANT CHANGE UP (ref 3.8–10.5)

## 2019-11-19 PROCEDURE — 99223 1ST HOSP IP/OBS HIGH 75: CPT

## 2019-11-19 RX ORDER — THIAMINE MONONITRATE (VIT B1) 100 MG
500 TABLET ORAL EVERY 8 HOURS
Refills: 0 | Status: DISCONTINUED | OUTPATIENT
Start: 2019-11-19 | End: 2019-11-19

## 2019-11-19 RX ORDER — SODIUM CHLORIDE 9 MG/ML
1000 INJECTION, SOLUTION INTRAVENOUS
Refills: 0 | Status: DISCONTINUED | OUTPATIENT
Start: 2019-11-19 | End: 2019-11-20

## 2019-11-19 RX ORDER — FOLIC ACID 0.8 MG
1 TABLET ORAL DAILY
Refills: 0 | Status: DISCONTINUED | OUTPATIENT
Start: 2019-11-19 | End: 2019-11-20

## 2019-11-19 RX ORDER — THIAMINE MONONITRATE (VIT B1) 100 MG
500 TABLET ORAL EVERY 8 HOURS
Refills: 0 | Status: DISCONTINUED | OUTPATIENT
Start: 2019-11-19 | End: 2019-11-20

## 2019-11-19 RX ORDER — ERGOCALCIFEROL 1.25 MG/1
50000 CAPSULE ORAL
Refills: 0 | Status: DISCONTINUED | OUTPATIENT
Start: 2019-11-19 | End: 2019-11-20

## 2019-11-19 RX ORDER — SODIUM CHLORIDE 9 MG/ML
1000 INJECTION, SOLUTION INTRAVENOUS
Refills: 0 | Status: DISCONTINUED | OUTPATIENT
Start: 2019-11-19 | End: 2019-11-19

## 2019-11-19 RX ORDER — PANTOPRAZOLE SODIUM 20 MG/1
40 TABLET, DELAYED RELEASE ORAL
Refills: 0 | Status: DISCONTINUED | OUTPATIENT
Start: 2019-11-19 | End: 2019-11-20

## 2019-11-19 RX ORDER — CITALOPRAM 10 MG/1
20 TABLET, FILM COATED ORAL DAILY
Refills: 0 | Status: DISCONTINUED | OUTPATIENT
Start: 2019-11-19 | End: 2019-11-20

## 2019-11-19 RX ORDER — ENOXAPARIN SODIUM 100 MG/ML
40 INJECTION SUBCUTANEOUS DAILY
Refills: 0 | Status: DISCONTINUED | OUTPATIENT
Start: 2019-11-19 | End: 2019-11-20

## 2019-11-19 RX ADMIN — SODIUM CHLORIDE 100 MILLILITER(S): 9 INJECTION, SOLUTION INTRAVENOUS at 21:17

## 2019-11-19 RX ADMIN — Medication 1 MILLIGRAM(S): at 13:14

## 2019-11-19 RX ADMIN — Medication 105 MILLIGRAM(S): at 15:12

## 2019-11-19 RX ADMIN — ERGOCALCIFEROL 50000 UNIT(S): 1.25 CAPSULE ORAL at 15:13

## 2019-11-19 RX ADMIN — Medication 2 MILLIGRAM(S): at 09:37

## 2019-11-19 RX ADMIN — ENOXAPARIN SODIUM 40 MILLIGRAM(S): 100 INJECTION SUBCUTANEOUS at 13:14

## 2019-11-19 RX ADMIN — Medication 2 MILLIGRAM(S): at 06:19

## 2019-11-19 RX ADMIN — PANTOPRAZOLE SODIUM 40 MILLIGRAM(S): 20 TABLET, DELAYED RELEASE ORAL at 08:25

## 2019-11-19 RX ADMIN — Medication 105 MILLIGRAM(S): at 08:25

## 2019-11-19 RX ADMIN — Medication 105 MILLIGRAM(S): at 22:13

## 2019-11-19 RX ADMIN — SODIUM CHLORIDE 100 MILLILITER(S): 9 INJECTION, SOLUTION INTRAVENOUS at 13:13

## 2019-11-19 NOTE — H&P ADULT - ASSESSMENT
34 y F from home  with history of EtOH abuse and depression was BIB  for EtOH intoxication    Pt will admitted to tele  for alcohol intoxication

## 2019-11-19 NOTE — DISCHARGE NOTE PROVIDER - NSDCQMAMI_CARD_ALL_CORE
Patient called and wanted Esperanza to know when he received his flu shot 10- Fluzone quad at the [facility]  Pneumonia shot prevnar 13 8-3-2017 at the [facility] and he received the Shingles vaccine on 8-3-2017 at the [facility]       No

## 2019-11-19 NOTE — ED ADULT NURSE REASSESSMENT NOTE - NS ED NURSE REASSESS COMMENT FT1
received pt from WALDO Kim, IKER/Betty, yellow gown & RA in place. pt is calm, cooperative, telly box G in place. [id number]. Breathing unlabored. NAD.

## 2019-11-19 NOTE — DISCHARGE NOTE PROVIDER - NSDCMRMEDTOKEN_GEN_ALL_CORE_FT
citalopram 20 mg oral tablet: 1 tab(s) orally once a day  folic acid 0.4 mg oral tablet: 1 tab(s) orally once a day   thiamine 100 mg oral tablet: 1 tab(s) orally once a day citalopram 20 mg oral tablet: 1 tab(s) orally once a day  Drisdol 50,000 intl units (1.25 mg) oral capsule: 1 cap(s) orally once a week  folic acid 0.4 mg oral tablet: 1 tab(s) orally once a day   pantoprazole 40 mg oral delayed release tablet: 1 tab(s) orally once a day (before a meal)  thiamine 100 mg oral tablet: 1 tab(s) orally once a day

## 2019-11-19 NOTE — H&P ADULT - NSHPPHYSICALEXAM_GEN_ALL_CORE
GENERAL: Sedated   EYES:  PERRLA,   NECK: Supple, No JVD  CHEST/LUNG: Clear to auscultation b/l  No rales, rhonchi, wheezing   HEART: Regular rate and rhythm; No murmurs, +ve S1 S2   ABDOMEN: Soft,, Nondistended; Bowel sounds present  NERVOUS SYSTEM:  Sedated     EXTREMITIES:   No clubbing, cyanosis, or edema  LYMPH NODES : non palpable   SKIN : warm

## 2019-11-19 NOTE — DISCHARGE NOTE PROVIDER - CARE PROVIDER_API CALL
Dylon Vines)  Internal Medicine  [address], [address]  Credentialing Department  [place], [place] [address]  Phone: [phone]  Fax: [phone]  Follow Up Time: 1 week

## 2019-11-19 NOTE — BEHAVIORAL HEALTH ASSESSMENT NOTE - NSBHCHARTREVIEWVS_PSY_A_CORE FT
Vital Signs Last 24 Hrs  T(C): 37.1 (19 Nov 2019 11:04), Max: 37.1 (18 Nov 2019 23:15)  T(F): 98.8 (19 Nov 2019 11:04), Max: 98.8 (19 Nov 2019 11:04)  HR: 101 (19 Nov 2019 11:04) (101 - 130)  BP: 142/86 (19 Nov 2019 11:04) (108/66 - 142/86)  BP(mean): --  RR: 17 (19 Nov 2019 11:04) (17 - 20)  SpO2: 99% (19 Nov 2019 11:04) (96% - 100%)

## 2019-11-19 NOTE — BEHAVIORAL HEALTH ASSESSMENT NOTE - SUMMARY
35 yo Polish-American F,  and living with  and 15 yo son and working as [place]  for [employer], with PHx of depression and recurrent severe alcohol use DO with multiple recent admissions for alcohol withdrawal at this hospital, BIB  on 11/18 for alcohol intoxication and admitted for treatment of withdrawal. Psych consult was requested for med management, given pt's report that she takes Celexa for depression at home. On exam, pt endorses h/o depression and alcohol use DO, but remains precontemplative about IP rehab, despite recent h/o repeated admissions to this hospital for alcohol withdrawal. Pt describes home Celexa as helpful and states that she wishes to continue it while admitted. Pt does not appear to present an acute risk of harm to self or others at the time of assessment, and does not appear to be in need of admission to IP psych at the time of assessment.

## 2019-11-19 NOTE — BEHAVIORAL HEALTH ASSESSMENT NOTE - NSBHCHARTREVIEWINVESTIGATE_PSY_A_CORE FT
< from: 12 Lead ECG (11.18.19 @ 20:51) >    QTC Calculation(Bezet) 418 ms    P Axis 60 degrees    R Axis 65 degrees    T Axis 42 degrees    Diagnosis Line Sinus tachycardia  Anterior infarct , age undetermined  Abnormal ECG    < end of copied text >

## 2019-11-19 NOTE — H&P ADULT - ATTENDING COMMENTS
Pt seen and case discussed with housestaff.   34 year old woman with hx of alcohol abuse/ clinical depression on celexa brought in on account of acute alcohol intoxication and agitation. Similar hx in the past. Spoke with  who claims she has been using her Celexa with alcohol which causes further depressed moods and agitation.  In the ED she was tachycardic /combative and had to be sedated.    Vital Signs Last 24 Hrs  T(C): 37.1 (18 Nov 2019 23:15), Max: 37.1 (18 Nov 2019 23:15)  T(F): 98.7 (18 Nov 2019 23:15), Max: 98.7 (18 Nov 2019 23:15)  HR: 123 (18 Nov 2019 23:15) (115 - 130)  BP: 108/66 (18 Nov 2019 23:15) (108/66 - 130/71)  RR: 17 (18 Nov 2019 23:15) (17 - 21)  SpO2: 96% (18 Nov 2019 23:15) (96% - 98%)    Young woman, lying in bed sleeping, rousable to name but goes back to sleep  No agitation, warm to touch but no fevers   CTA B/l; RRR S1S2 only, no tachycardia  Soft NT ND BS +  No pedal edema    Labs                        13.1   10.26 )-----------( 237      ( 19 Nov 2019 06:04 )             39.7     11-19    140  |  109  |  9   ---------------------<  76  3.9   |  24  |  0.56    Ca    7.5<L>      19 Nov 2019 06:04  Phos  2.5     11-19  Mg     1.7     11-19  TPro  6.5  /  Alb  3.2<L>  /  TBili  0.4  /  DBili  x   /  AST  30  /  ALT  62<H>  /  AlkPhos  46  11-19    Trig - 550  chol - 283  ca - 7.5  alc - 456    Impression  34 year old woman - pt of Dr Enriquez presenting with acute alcohol intoxication with acute agitation and combativeness.  Pt on Celexa which she has been using with alcohol with can potentiate depression and anxiety disorder. Will need counselling once improved    A/P  Alcohol intoxication  Clinical depression  Elevated triglyceride  - Admit to tele  - Aggressive [facility]  - [facility] protocol  - Psychiatry consult - re - evaluate / r/o suicidal attempt or ideation  - Continue observation - enhanced/constant  - Thiamine 250 mg PO TID  - Check lipase level  -  consult

## 2019-11-19 NOTE — PROGRESS NOTE ADULT - SUBJECTIVE AND OBJECTIVE BOX
HPI:  34 y F from home  with history of EtOH abuse and depression was BIB  for EtOH intoxication today.History obtained from ED provider note , no family at bedside present. Pt has multiple admissions in the last few months for same problem.As per , she has been drinking bottles daily for the past week. Pt was combative on arrival to ED so pt was sedated and put on restraints by The ED physician. Pt had EEG and MRI brain done on the last adm which were normal     ED course : Pt persistently tachycardic to 120s .. Afebrile , no wbc count , Labs noted for  Hb 15.4 , HCO3 21 . CXR : clear . Pt got ativan IV , versed IM x 2 , Benadryl , haldol in the ED . UTox +ve for BZD . Pt protecting airway (19 Nov 2019 03:04)      Patient is a 34y old  Female who presents with a chief complaint of Alcohol intoxication (19 Nov 2019 03:04)      INTERVAL HPI/OVERNIGHT EVENTS:  T(C): 37.1 (11-19-19 @ 11:04), Max: 37.1 (11-18-19 @ 23:15)  HR: 101 (11-19-19 @ 11:04) (101 - 130)  BP: 142/86 (11-19-19 @ 11:04) (108/66 - 142/86)  RR: 17 (11-19-19 @ 11:04) (17 - 21)  SpO2: 99% (11-19-19 @ 11:04) (96% - 100%)  Wt(kg): --  I&O's Summary      REVIEW OF SYSTEMS: denies fever, chills, SOB, palpitations, chest pain, abdominal pain, nausea, vomitting, diarrhea, constipation, dizziness    MEDICATIONS  (STANDING):  enoxaparin Injectable 40 milliGRAM(s) SubCutaneous daily  ergocalciferol 75403 Unit(s) Oral <User Schedule>  folic acid 1 milliGRAM(s) Oral daily  lactated ringers. 1000 milliLiter(s) (100 mL/Hr) IV Continuous <Continuous>  LORazepam   Injectable 2 milliGRAM(s) IntraMuscular every 4 hours  multivitamin/thiamine/folic acid in sodium chloride 0.9% 1000 milliLiter(s) (100 mL/Hr) IV Continuous <Continuous>  pantoprazole    Tablet 40 milliGRAM(s) Oral before breakfast  thiamine IVPB 500 milliGRAM(s) IV Intermittent every 8 hours    MEDICATIONS  (PRN):  LORazepam   Injectable 2 milliGRAM(s) IV Push every 2 hours PRN CIWA > 7      PHYSICAL EXAM:  GENERAL: NAD, well-groomed, well-developed  HEAD:  Atraumatic, Normocephalic  EYES: EOMI, PERRLA, conjunctiva and sclera clear  ENMT: No tonsillar erythema, exudates, or enlargement; Moist mucous membranes, Good dentition, No lesions  NECK: Supple, No JVD, Normal thyroid  NERVOUS SYSTEM:  Alert & Oriented X3, Good concentration; Motor Strength 5/5 B/L upper and lower extremities; DTRs 2+ intact and symmetric  CHEST/LUNG: Clear to percussion bilaterally; No rales, rhonchi, wheezing, or rubs  HEART: Regular rate and rhythm; No murmurs, rubs, or gallops  ABDOMEN: Soft, Nontender, Nondistended; Bowel sounds present  EXTREMITIES:  2+ Peripheral Pulses, No clubbing, cyanosis, or edema  LYMPH: No lymphadenopathy noted  SKIN: No rashes or lesions  LABS:                        13.1   10.26 )-----------( 237      ( 19 Nov 2019 06:04 )             39.7     11-19    140  |  109<H>  |  9   ----------------------------<  76  3.9   |  24  |  0.56    Ca    7.5<L>      19 Nov 2019 06:04  Phos  2.5     11-19  Mg     1.7     11-19    TPro  6.5  /  Alb  3.2<L>  /  TBili  0.4  /  DBili  x   /  AST  30  /  ALT  62<H>  /  AlkPhos  46  11-19        CAPILLARY BLOOD GLUCOSE      POCT Blood Glucose.: 99 mg/dL (18 Nov 2019 16:38)

## 2019-11-19 NOTE — PROGRESS NOTE ADULT - ASSESSMENT
pt was recently d/cd home with etoh abuse and depression reecentlu was brought in sec to agitation and elevated etoh level over 400  was given short acting benzo pt got better was tachycardic.  no fall  pt was on celexa  pt also had seizure last admission  ct head , mri head was done both negative  was also seen by neuro.  but psych refused to see pt       alt 62 chol 283  tg 550 pt was recently d/cd home with etoh abuse and depression reecentlu was brought in sec to agitation and elevated etoh level over 400  was given short acting benzo pt got better was tachycardic.  no fall  pt was on celexa  pt also had seizure last admission  ct head , mri head was done both negative  was also seen by neuro.  but psych refused to see pt     as per her she started drinking again as she was feeling depressed  and couldnt resist.  no fall  no seizure  , no hemetesis   pt was tachycardic  still without cp or palp or sob.  no abd pain, no nausea.now awake alert x 3  physical done  heart no murmur  tavhy 120b/m reg regular    abd soft bs nml non tender  perrla eomi  no icterus , no nystagmus   cns  power reflex  nml  no tremers  alt 62 chol 283  tg 550 ( possible sec to etoh)   d/w psych  about the pt    social service for inpt rehab  watch labs

## 2019-11-19 NOTE — CHART NOTE - NSCHARTNOTEFT_GEN_A_CORE
Consult received for alcohol abuse,  positive SBIRT and positive depression  Pt is a 34 y old female  from home  with history of EtOH abuse and depression. Pt was brought to the ED for EtOH intoxication today. History obtained from ED provider note.  Pt has multiple admissions in the last few months for same problem. As per , she has been drinking bottles daily for the past week. Pt was combative on arrival to ED so Pt was sedated and put on restraints by The ED physician. Katia met with Pt at bedside after she had sobered up,  was present. Pt permitted to stay during the interview. Pt was not able to be screened for positive SBIRT and positive Depression due to acuity of illness. Pt refused inpt rehab at this time due to her job. She stated that she would be on seasonal layoff starting 12/13, at which time she would  consider going to rehab, but does not feel able to do so now. 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

## 2019-11-19 NOTE — BEHAVIORAL HEALTH ASSESSMENT NOTE - NSBHCHARTREVIEWIMAGING_PSY_A_CORE FT
< from: Xray Chest 1 View-PORTABLE IMMEDIATE (11.18.19 @ 22:09) >    IMPRESSION:    Clear lungs.      < end of copied text >

## 2019-11-19 NOTE — H&P ADULT - PROBLEM SELECTOR PLAN 1
p/w alcohol intoxication  -   -started on CIWA protocol for withdrawal  -Ativan IV 2mg q4 standing and 2mg IV q2 prn for CIWA >7  -Banana bag, PO folic acid and thiamine IV  mg q8   - constant observation and restraints   - NPO for now , advance diet when mental status improves   -SW consult

## 2019-11-19 NOTE — BEHAVIORAL HEALTH ASSESSMENT NOTE - SUICIDE PROTECTIVE FACTORS
Responsibility to family and others/Identifies reasons for living/Has future plans/Supportive social network of family or friends/Positive therapeutic relationships/Cultural, spiritual and/or moral attitudes against suicide/Engaged in work or school

## 2019-11-19 NOTE — BEHAVIORAL HEALTH ASSESSMENT NOTE - HPI (INCLUDE ILLNESS QUALITY, SEVERITY, DURATION, TIMING, CONTEXT, MODIFYING FACTORS, ASSOCIATED SIGNS AND SYMPTOMS)
33 yo Polish-American F,  and living with  and 15 yo son and working as [place]  for [employer], with PHx of depression and recurrent severe alcohol use DO with multiple recent admissions for alcohol withdrawal at this hospital, BIB  on 11/18 for alcohol intoxication and admitted for treatment of withdrawal. Psych consult was requested for med management, given pt's report that she takes Celexa for depression at home. Pt seen in ED for interview, lying in stretcher on her side dozing. Pt confirms that she is here for alcohol withdrawal, adding that she has been drinking heavily since getting  from first  4-5 years ago ("They say it's like a PTSD, but I just drank"). 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, working on roads and bridges). Pt says she and  will be on seasonal layoff starting 12/13, at which time she will consider going to rehab, but does not feel able to do so now. Pt also endorses longstanding h/o depression, and reports that she takes Celexa "on and off" at home. Per pt, it used to be prescribed by PMD, but is now being managed by a psychiatrist she started seeing at [facility] a month ago, who recommended she increase her dose of Celexa from 10 mg to 20 mg. Pt says she considers Celexa helpful and wishes to resume taking it while she is here. Pt describes mood as "OK" and denies SI/HI/AVH.

## 2019-11-19 NOTE — DISCHARGE NOTE PROVIDER - HOSPITAL COURSE
33 yo female  from home  with history of EtOH abuse and depression who was BIB  for EtOH intoxication. Pt . CIWA protocol initiated. Celexa 20mg po daily resumed. Psyche and SW following . Pt is a 34 y old female  from home  with history of EtOH abuse and depression. Pt was brought to the ED for EtOH intoxication today. Pt . CIWA protocol initiated. Celexa 20mg po daily resumed. Silvina and DIANDRA salas.   Pt has multiple admissions in the last few months for same problem. As per , she has been drinking bottles daily for the past week. Pt was combative on arrival to ED so Pt was sedated and put on restraints by The ED physician. Katia met with Pt at bedside after she had sobered up,  was present. Pt permitted to stay during the interview. Pt was not able to be screened for positive SBIRT and positive Depression due to acuity of illness. Pt refused inpt rehab at this time due to her job. She stated that she would be on seasonal layoff starting 12/13, at which time she would  consider going to rehab, but does not feel able to do so now. 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    Pt was admitted to tele floor as she was persistently tachycardic. EKG NSR.     Psych recommended C/w home antidepressant, Celexa 20 mg qd and exploring options for rehab     Pt persistent about leave the hospital for work.         Given patient's improved clinical status and current hemodynamic stability, decision was made to discharge.    Please refer to patient's complete medical chart with documents for a full hospital course, for this is only a brief summary.

## 2019-11-19 NOTE — BEHAVIORAL HEALTH ASSESSMENT NOTE - NSBHSOCIALHXDETAILSFT_PSY_A_CORE
B/R in [place], came to [place] with family @age 12. Formerly  but  in 2014. Now remarried, lives with  and 15 yo son in [place]. 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

## 2019-11-19 NOTE — DISCHARGE NOTE PROVIDER - NSDCCPCAREPLAN_GEN_ALL_CORE_FT
PRINCIPAL DISCHARGE DIAGNOSIS  Diagnosis: Alcohol withdrawal syndrome with complication  Assessment and Plan of Treatment: You presented with alcohol intoxication and agitation you were managed in the emergency department and brought to the medicine floor when you were persistently tachycardic. EKG showed normal sinus rhythm and you were medications to calm you and manage your state. You were conseled by .   Please try to stop drinking alcohol. You were offered help but you declined rehab at the moment you have a appointment please follow up with your doctor. Continue taking yor medication. Whenever you are interested and you need help, please inform your doctor.

## 2019-11-19 NOTE — BEHAVIORAL HEALTH ASSESSMENT NOTE - RISK ASSESSMENT
Low Acute Suicide Risk Risk factor: Alcohol use DO. Protective factors: Stable domicile with supportive family, stable employment, future orientation, engagement in  mental health treatment. Risk level appears low at the time of assessment.

## 2019-11-19 NOTE — H&P ADULT - PROBLEM SELECTOR PLAN 4
IMPROVE VTE Individual Risk Assessment  RISK                                                                Points  [  ] Previous VTE                                                  3  [  ] Thrombophilia                                               2  [  ] Lower limb paralysis                                      2  [  ] Current Cancer                                              2         (within 6 months)  [  ] Immobilization > 24 hrs                                1  [  ] ICU/CCU stay > 24 hours                              1  [  ] Age > 60                                                      1  IMPROVE VTE Score ___1______  DVT ppx : Lovenox   GI ppx : PPI

## 2019-11-20 VITALS
DIASTOLIC BLOOD PRESSURE: 80 MMHG | RESPIRATION RATE: 18 BRPM | SYSTOLIC BLOOD PRESSURE: 150 MMHG | TEMPERATURE: 98 F | OXYGEN SATURATION: 98 % | HEART RATE: 75 BPM

## 2019-11-20 LAB
ANION GAP SERPL CALC-SCNC: 7 MMOL/L — SIGNIFICANT CHANGE UP (ref 5–17)
BUN SERPL-MCNC: 9 MG/DL — SIGNIFICANT CHANGE UP (ref 7–18)
CALCIUM SERPL-MCNC: 8.5 MG/DL — SIGNIFICANT CHANGE UP (ref 8.4–10.5)
CHLORIDE SERPL-SCNC: 108 MMOL/L — SIGNIFICANT CHANGE UP (ref 96–108)
CO2 SERPL-SCNC: 25 MMOL/L — SIGNIFICANT CHANGE UP (ref 22–31)
CREAT SERPL-MCNC: 0.45 MG/DL — LOW (ref 0.5–1.3)
GLUCOSE SERPL-MCNC: 89 MG/DL — SIGNIFICANT CHANGE UP (ref 70–99)
POTASSIUM SERPL-MCNC: 3.5 MMOL/L — SIGNIFICANT CHANGE UP (ref 3.5–5.3)
POTASSIUM SERPL-SCNC: 3.5 MMOL/L — SIGNIFICANT CHANGE UP (ref 3.5–5.3)
SODIUM SERPL-SCNC: 140 MMOL/L — SIGNIFICANT CHANGE UP (ref 135–145)
TRIGL SERPL-MCNC: 225 MG/DL — HIGH (ref 10–149)

## 2019-11-20 PROCEDURE — 80061 LIPID PANEL: CPT

## 2019-11-20 PROCEDURE — 71045 X-RAY EXAM CHEST 1 VIEW: CPT

## 2019-11-20 PROCEDURE — 84100 ASSAY OF PHOSPHORUS: CPT

## 2019-11-20 PROCEDURE — 93005 ELECTROCARDIOGRAM TRACING: CPT

## 2019-11-20 PROCEDURE — 36415 COLL VENOUS BLD VENIPUNCTURE: CPT

## 2019-11-20 PROCEDURE — 83690 ASSAY OF LIPASE: CPT

## 2019-11-20 PROCEDURE — 80048 BASIC METABOLIC PNL TOTAL CA: CPT

## 2019-11-20 PROCEDURE — 84702 CHORIONIC GONADOTROPIN TEST: CPT

## 2019-11-20 PROCEDURE — 82150 ASSAY OF AMYLASE: CPT

## 2019-11-20 PROCEDURE — 82962 GLUCOSE BLOOD TEST: CPT

## 2019-11-20 PROCEDURE — 80053 COMPREHEN METABOLIC PANEL: CPT

## 2019-11-20 PROCEDURE — 84478 ASSAY OF TRIGLYCERIDES: CPT

## 2019-11-20 PROCEDURE — 82306 VITAMIN D 25 HYDROXY: CPT

## 2019-11-20 PROCEDURE — 96372 THER/PROPH/DIAG INJ SC/IM: CPT | Mod: XU

## 2019-11-20 PROCEDURE — 93306 TTE W/DOPPLER COMPLETE: CPT

## 2019-11-20 PROCEDURE — 99285 EMERGENCY DEPT VISIT HI MDM: CPT | Mod: 25

## 2019-11-20 PROCEDURE — 96374 THER/PROPH/DIAG INJ IV PUSH: CPT

## 2019-11-20 PROCEDURE — 80307 DRUG TEST PRSMV CHEM ANLYZR: CPT

## 2019-11-20 PROCEDURE — 83036 HEMOGLOBIN GLYCOSYLATED A1C: CPT

## 2019-11-20 PROCEDURE — 82607 VITAMIN B-12: CPT

## 2019-11-20 PROCEDURE — 83735 ASSAY OF MAGNESIUM: CPT

## 2019-11-20 PROCEDURE — 85027 COMPLETE CBC AUTOMATED: CPT

## 2019-11-20 PROCEDURE — 84443 ASSAY THYROID STIM HORMONE: CPT

## 2019-11-20 RX ORDER — PANTOPRAZOLE SODIUM 20 MG/1
1 TABLET, DELAYED RELEASE ORAL
Qty: 30 | Refills: 0
Start: 2019-11-20 | End: 2019-12-19

## 2019-11-20 RX ORDER — ERGOCALCIFEROL 1.25 MG/1
1 CAPSULE ORAL
Qty: 1 | Refills: 0
Start: 2019-11-20 | End: 2019-11-27

## 2019-11-20 RX ADMIN — Medication 105 MILLIGRAM(S): at 06:10

## 2019-11-20 RX ADMIN — SODIUM CHLORIDE 75 MILLILITER(S): 9 INJECTION, SOLUTION INTRAVENOUS at 01:38

## 2019-11-20 RX ADMIN — Medication 1 MILLIGRAM(S): at 12:17

## 2019-11-20 RX ADMIN — ENOXAPARIN SODIUM 40 MILLIGRAM(S): 100 INJECTION SUBCUTANEOUS at 12:19

## 2019-11-20 RX ADMIN — Medication 1 MILLIGRAM(S): at 06:06

## 2019-11-20 RX ADMIN — CITALOPRAM 20 MILLIGRAM(S): 10 TABLET, FILM COATED ORAL at 12:17

## 2019-11-20 RX ADMIN — PANTOPRAZOLE SODIUM 40 MILLIGRAM(S): 20 TABLET, DELAYED RELEASE ORAL at 06:10

## 2019-11-20 RX ADMIN — Medication 1 TABLET(S): at 12:17

## 2019-11-20 RX ADMIN — Medication 105 MILLIGRAM(S): at 13:03

## 2019-11-20 NOTE — PROGRESS NOTE ADULT - ASSESSMENT
seen and examined vsstable afebrile physical unchaged  no abd pain.  still depressed  crying  seen by psych yesterday  recommended celexa to be cont outpt psych f/u  pt is refusing for in etoh rehab   pt is awake alert  cleared by psych  high lipids  possibly sec to etoh  d/w pt risks explained  advised lipid panel in 4 weeks  low fat , low chol diet  and of course avoid etoh  risks associated expalined to pt again including death

## 2019-11-20 NOTE — PROGRESS NOTE ADULT - SUBJECTIVE AND OBJECTIVE BOX
PGY 1 Note discussed with supervising resident and primary attending    Patient is a 34y old  Female who presents with a chief complaint of Alcohol intoxication (20 Nov 2019 14:09)      INTERVAL HPI/OVERNIGHT EVENTS: offers no new complaints; current symptoms resolving    MEDICATIONS  (STANDING):  citalopram 20 milliGRAM(s) Oral daily  enoxaparin Injectable 40 milliGRAM(s) SubCutaneous daily  ergocalciferol 04351 Unit(s) Oral <User Schedule>  folic acid 1 milliGRAM(s) Oral daily  lactated ringers. 1000 milliLiter(s) (75 mL/Hr) IV Continuous <Continuous>  LORazepam   Injectable 1 milliGRAM(s) IV Push every 6 hours  multivitamin 1 Tablet(s) Oral daily  pantoprazole    Tablet 40 milliGRAM(s) Oral before breakfast  thiamine IVPB 500 milliGRAM(s) IV Intermittent every 8 hours    MEDICATIONS  (PRN):  LORazepam   Injectable 2 milliGRAM(s) IV Push every 2 hours PRN CIWA > 7      __________________________________________________  REVIEW OF SYSTEMS:    CONSTITUTIONAL: No fever,   EYES: no acute visual disturbances  NECK: No pain or stiffness  RESPIRATORY: No cough; No shortness of breath  CARDIOVASCULAR: No chest pain, no palpitations  GASTROINTESTINAL: No pain. No nausea or vomiting; No diarrhea   NEUROLOGICAL: No headache or numbness, no tremors  MUSCULOSKELETAL: No joint pain, no muscle pain  GENITOURINARY: no dysuria, no frequency, no hesitancy  PSYCHIATRY: no depression , no anxiety  ALL OTHER  ROS negative        Vital Signs Last 24 Hrs  T(C): 36.8 (20 Nov 2019 15:46), Max: 37.1 (19 Nov 2019 18:50)  T(F): 98.3 (20 Nov 2019 15:46), Max: 98.7 (19 Nov 2019 18:50)  HR: 75 (20 Nov 2019 15:46) (72 - 88)  BP: 150/80 (20 Nov 2019 15:46) (129/83 - 150/80)  BP(mean): --  RR: 18 (20 Nov 2019 15:46) (18 - 18)  SpO2: 98% (20 Nov 2019 15:46) (97% - 100%)    ________________________________________________  PHYSICAL EXAM:  GENERAL: NAD  HEENT: Normocephalic;  conjunctivae and sclerae clear; moist mucous membranes;   NECK : supple  CHEST/LUNG: Clear to auscultation bilaterally with good air entry   HEART: S1 S2  regular; no murmurs, gallops or rubs  ABDOMEN: Soft, Nontender, Nondistended; Bowel sounds present  EXTREMITIES: no cyanosis; no edema; no calf tenderness  SKIN: warm and dry; no rash  NERVOUS SYSTEM:  Awake and alert; Oriented  to place, person and time ; no new deficits    _________________________________________________  LABS:                        13.1   10.26 )-----------( 237      ( 19 Nov 2019 06:04 )             39.7     11-20    140  |  108  |  9   ----------------------------<  89  3.5   |  25  |  0.45<L>    Ca    8.5      20 Nov 2019 07:05  Phos  2.5     11-19  Mg     1.7     11-19    TPro  6.5  /  Alb  3.2<L>  /  TBili  0.4  /  DBili  x   /  AST  30  /  ALT  62<H>  /  AlkPhos  46  11-19        CAPILLARY BLOOD GLUCOSE            RADIOLOGY & ADDITIONAL TESTS:    Imaging Personally Reviewed:  YES/NO    Consultant(s) Notes Reviewed:   YES/ No    Care Discussed with Consultants :     Plan of care was discussed with patient and /or primary care giver; all questions and concerns were addressed and care was aligned with patient's wishes.

## 2019-11-20 NOTE — DISCHARGE NOTE NURSING/CASE MANAGEMENT/SOCIAL WORK - PATIENT PORTAL LINK FT
You can access the FollowMyHealth Patient Portal offered by [facility] by registering at the following website: [email or web address]. By joining [employer]’s FollowMyHealth portal, you will also be able to view your health information using other applications (apps) compatible with our system.

## 2019-11-20 NOTE — PROGRESS NOTE ADULT - SUBJECTIVE AND OBJECTIVE BOX
HPI:  34 y F from home  with history of EtOH abuse and depression was BIB  for EtOH intoxication today.History obtained from ED provider note , no family at bedside present. Pt has multiple admissions in the last few months for same problem.As per , she has been drinking bottles daily for the past week. Pt was combative on arrival to ED so pt was sedated and put on restraints by The ED physician. Pt had EEG and MRI brain done on the last adm which were normal     ED course : Pt persistently tachycardic to 120s .. Afebrile , no wbc count , Labs noted for  Hb 15.4 , HCO3 21 . CXR : clear . Pt got ativan IV , versed IM x 2 , Benadryl , haldol in the ED . UTox +ve for BZD . Pt protecting airway (19 Nov 2019 03:04)      Patient is a 34y old  Female who presents with a chief complaint of Alcohol intoxication (19 Nov 2019 19:11)      INTERVAL HPI/OVERNIGHT EVENTS:  T(C): 36.4 (11-20-19 @ 11:15), Max: 37.1 (11-19-19 @ 18:50)  HR: 72 (11-20-19 @ 11:15) (72 - 88)  BP: 148/83 (11-20-19 @ 11:15) (129/83 - 148/83)  RR: 18 (11-20-19 @ 11:15) (18 - 18)  SpO2: 100% (11-20-19 @ 11:15) (97% - 100%)  Wt(kg): --  I&O's Summary    19 Nov 2019 07:01  -  20 Nov 2019 07:00  --------------------------------------------------------  IN: 1650 mL / OUT: 0 mL / NET: 1650 mL        REVIEW OF SYSTEMS: denies fever, chills, SOB, palpitations, chest pain, abdominal pain, nausea, vomitting, diarrhea, constipation, dizziness    MEDICATIONS  (STANDING):  citalopram 20 milliGRAM(s) Oral daily  enoxaparin Injectable 40 milliGRAM(s) SubCutaneous daily  ergocalciferol 19399 Unit(s) Oral <User Schedule>  folic acid 1 milliGRAM(s) Oral daily  lactated ringers. 1000 milliLiter(s) (75 mL/Hr) IV Continuous <Continuous>  LORazepam   Injectable 1 milliGRAM(s) IV Push every 6 hours  multivitamin 1 Tablet(s) Oral daily  pantoprazole    Tablet 40 milliGRAM(s) Oral before breakfast  thiamine IVPB 500 milliGRAM(s) IV Intermittent every 8 hours    MEDICATIONS  (PRN):  LORazepam   Injectable 2 milliGRAM(s) IV Push every 2 hours PRN CIWA > 7      PHYSICAL EXAM:  GENERAL: NAD, well-groomed, well-developed  HEAD:  Atraumatic, Normocephalic  EYES: EOMI, PERRLA, conjunctiva and sclera clear  ENMT: No tonsillar erythema, exudates, or enlargement; Moist mucous membranes, Good dentition, No lesions  NECK: Supple, No JVD, Normal thyroid  NERVOUS SYSTEM:  Alert & Oriented X3, Good concentration; Motor Strength 5/5 B/L upper and lower extremities; DTRs 2+ intact and symmetric  CHEST/LUNG: Clear to percussion bilaterally; No rales, rhonchi, wheezing, or rubs  HEART: Regular rate and rhythm; No murmurs, rubs, or gallops  ABDOMEN: Soft, Nontender, Nondistended; Bowel sounds present  EXTREMITIES:  2+ Peripheral Pulses, No clubbing, cyanosis, or edema  LYMPH: No lymphadenopathy noted  SKIN: No rashes or lesions  LABS:                        13.1   10.26 )-----------( 237      ( 19 Nov 2019 06:04 )             39.7     11-20    140  |  108  |  9   ----------------------------<  89  3.5   |  25  |  0.45<L>    Ca    8.5      20 Nov 2019 07:05  Phos  2.5     11-19  Mg     1.7     11-19    TPro  6.5  /  Alb  3.2<L>  /  TBili  0.4  /  DBili  x   /  AST  30  /  ALT  62<H>  /  AlkPhos  46  11-19        CAPILLARY BLOOD GLUCOSE

## 2019-11-25 ENCOUNTER — EMERGENCY (EMERGENCY)
Facility: HOSPITAL | Age: 34
LOS: 1 days | Discharge: HOME | End: 2019-11-25
Attending: EMERGENCY MEDICINE
Payer: MEDICAID

## 2019-11-25 VITALS
OXYGEN SATURATION: 96 % | WEIGHT: 169.98 LBS | RESPIRATION RATE: 18 BRPM | DIASTOLIC BLOOD PRESSURE: 85 MMHG | HEART RATE: 94 BPM | HEIGHT: 62 IN | SYSTOLIC BLOOD PRESSURE: 148 MMHG | TEMPERATURE: 98 F

## 2019-11-25 LAB
ALBUMIN SERPL ELPH-MCNC: 3.5 G/DL — SIGNIFICANT CHANGE UP (ref 3.5–5)
ALP SERPL-CCNC: 48 U/L — SIGNIFICANT CHANGE UP (ref 40–120)
ALT FLD-CCNC: 64 U/L DA — HIGH (ref 10–60)
ANION GAP SERPL CALC-SCNC: 7 MMOL/L — SIGNIFICANT CHANGE UP (ref 5–17)
AST SERPL-CCNC: 26 U/L — SIGNIFICANT CHANGE UP (ref 10–40)
BASOPHILS # BLD AUTO: 0.07 K/UL — SIGNIFICANT CHANGE UP (ref 0–0.2)
BASOPHILS NFR BLD AUTO: 0.8 % — SIGNIFICANT CHANGE UP (ref 0–2)
BILIRUB SERPL-MCNC: 0.2 MG/DL — SIGNIFICANT CHANGE UP (ref 0.2–1.2)
BUN SERPL-MCNC: 9 MG/DL — SIGNIFICANT CHANGE UP (ref 7–18)
CALCIUM SERPL-MCNC: 8.8 MG/DL — SIGNIFICANT CHANGE UP (ref 8.4–10.5)
CHLORIDE SERPL-SCNC: 110 MMOL/L — HIGH (ref 96–108)
CO2 SERPL-SCNC: 29 MMOL/L — SIGNIFICANT CHANGE UP (ref 22–31)
CREAT SERPL-MCNC: 0.63 MG/DL — SIGNIFICANT CHANGE UP (ref 0.5–1.3)
EOSINOPHIL # BLD AUTO: 0.27 K/UL — SIGNIFICANT CHANGE UP (ref 0–0.5)
EOSINOPHIL NFR BLD AUTO: 3.1 % — SIGNIFICANT CHANGE UP (ref 0–6)
ETHANOL SERPL-MCNC: 427 MG/DL — HIGH (ref 0–10)
GLUCOSE SERPL-MCNC: 92 MG/DL — SIGNIFICANT CHANGE UP (ref 70–99)
HCG SERPL-ACNC: <1 MIU/ML — SIGNIFICANT CHANGE UP
HCT VFR BLD CALC: 42.3 % — SIGNIFICANT CHANGE UP (ref 34.5–45)
HGB BLD-MCNC: 14 G/DL — SIGNIFICANT CHANGE UP (ref 11.5–15.5)
IMM GRANULOCYTES NFR BLD AUTO: 0.5 % — SIGNIFICANT CHANGE UP (ref 0–1.5)
LYMPHOCYTES # BLD AUTO: 3.59 K/UL — HIGH (ref 1–3.3)
LYMPHOCYTES # BLD AUTO: 41.4 % — SIGNIFICANT CHANGE UP (ref 13–44)
MAGNESIUM SERPL-MCNC: 1.9 MG/DL — SIGNIFICANT CHANGE UP (ref 1.6–2.6)
MCHC RBC-ENTMCNC: 31.2 PG — SIGNIFICANT CHANGE UP (ref 27–34)
MCHC RBC-ENTMCNC: 33.1 GM/DL — SIGNIFICANT CHANGE UP (ref 32–36)
MCV RBC AUTO: 94.2 FL — SIGNIFICANT CHANGE UP (ref 80–100)
MONOCYTES # BLD AUTO: 0.62 K/UL — SIGNIFICANT CHANGE UP (ref 0–0.9)
MONOCYTES NFR BLD AUTO: 7.2 % — SIGNIFICANT CHANGE UP (ref 2–14)
NEUTROPHILS # BLD AUTO: 4.08 K/UL — SIGNIFICANT CHANGE UP (ref 1.8–7.4)
NEUTROPHILS NFR BLD AUTO: 47 % — SIGNIFICANT CHANGE UP (ref 43–77)
NRBC # BLD: 0 /100 WBCS — SIGNIFICANT CHANGE UP (ref 0–0)
PHOSPHATE SERPL-MCNC: 4.2 MG/DL — SIGNIFICANT CHANGE UP (ref 2.5–4.5)
PLATELET # BLD AUTO: 291 K/UL — SIGNIFICANT CHANGE UP (ref 150–400)
POTASSIUM SERPL-MCNC: 4 MMOL/L — SIGNIFICANT CHANGE UP (ref 3.5–5.3)
POTASSIUM SERPL-SCNC: 4 MMOL/L — SIGNIFICANT CHANGE UP (ref 3.5–5.3)
PROT SERPL-MCNC: 7 G/DL — SIGNIFICANT CHANGE UP (ref 6–8.3)
RBC # BLD: 4.49 M/UL — SIGNIFICANT CHANGE UP (ref 3.8–5.2)
RBC # FLD: 13.9 % — SIGNIFICANT CHANGE UP (ref 10.3–14.5)
SODIUM SERPL-SCNC: 146 MMOL/L — HIGH (ref 135–145)
WBC # BLD: 8.67 K/UL — SIGNIFICANT CHANGE UP (ref 3.8–10.5)
WBC # FLD AUTO: 8.67 K/UL — SIGNIFICANT CHANGE UP (ref 3.8–10.5)

## 2019-11-25 PROCEDURE — 99285 EMERGENCY DEPT VISIT HI MDM: CPT

## 2019-11-25 RX ORDER — HALOPERIDOL DECANOATE 100 MG/ML
5 INJECTION INTRAMUSCULAR ONCE
Refills: 0 | Status: COMPLETED | OUTPATIENT
Start: 2019-11-25 | End: 2019-11-25

## 2019-11-25 RX ORDER — DIPHENHYDRAMINE HCL 50 MG
25 CAPSULE ORAL ONCE
Refills: 0 | Status: COMPLETED | OUTPATIENT
Start: 2019-11-25 | End: 2019-11-25

## 2019-11-25 RX ADMIN — Medication 2 MILLIGRAM(S): at 20:32

## 2019-11-25 RX ADMIN — Medication 25 MILLIGRAM(S): at 20:32

## 2019-11-25 RX ADMIN — HALOPERIDOL DECANOATE 5 MILLIGRAM(S): 100 INJECTION INTRAMUSCULAR at 20:32

## 2019-11-25 NOTE — ED ADULT NURSE NOTE - OBJECTIVE STATEMENT
Pt. BIB EMS in police custody for ETOH.  called 911 because pt "drinking too much". Pt. is combative, screaming, threatening staff. and using inappropriate language.

## 2019-11-25 NOTE — ED PROVIDER NOTE - OBJECTIVE STATEMENT
35 y/o F with a significant PMHx of EtOH abuse was brought into the ED by EMS for alcohol intoxication. Patient is in police custody after being combative. Patient denies any drug use or any other complaints

## 2019-11-25 NOTE — ED ADULT NURSE NOTE - NSIMPLEMENTINTERV_GEN_ALL_ED
Implemented All Universal Safety Interventions:  [place] to call system. Call bell, personal items and telephone within reach. Instruct patient to call for assistance. Room bathroom lighting operational. Non-slip footwear when patient is off stretcher. Physically safe environment: no spills, clutter or unnecessary equipment. Stretcher in lowest position, wheels locked, appropriate side rails in place. Implemented All Fall Risk Interventions:  [place] to call system. Call bell, personal items and telephone within reach. Instruct patient to call for assistance. Room bathroom lighting operational. Non-slip footwear when patient is off stretcher. Physically safe environment: no spills, clutter or unnecessary equipment. Stretcher in lowest position, wheels locked, appropriate side rails in place. Provide visual cue, wrist band, yellow gown, etc. Monitor gait and stability. Monitor for mental status changes and reorient to person, place, and time. Review medications for side effects contributing to fall risk. Reinforce activity limits and safety measures with patient and family.

## 2019-11-25 NOTE — CHART NOTE - NSCHARTNOTEFT_GEN_A_CORE
Pt is a  34 year old female with a significant PMHx of EtOH abuse. Pt is well known to this ED.  Pt was  brought into the ED today  by EMS for alcohol intoxication. Patient was in police custody after being combative, her hands and ankles cuffed. Pt was aggressive , yelling, cursing and using all sorts of profanities at everyone.  Pt had to be sedated for further evaluation. Labs to done, and she is being observed at this time pending sobriety. Pt's  was at bedside and he took Pt's gold like necklace with him. D/C disposition not determined at this time. SBIRT screening could not be completed with Pt at this time due to acuity of illness.

## 2019-11-25 NOTE — ED PROVIDER NOTE - PATIENT PORTAL LINK FT
You can access the FollowMyHealth Patient Portal offered by [facility] by registering at the following website: [email or web address]. By joining [employer]’s FollowMyHealth portal, you will also be able to view your health information using other applications (apps) compatible with our system.

## 2019-11-26 VITALS
HEART RATE: 96 BPM | RESPIRATION RATE: 18 BRPM | DIASTOLIC BLOOD PRESSURE: 82 MMHG | TEMPERATURE: 99 F | SYSTOLIC BLOOD PRESSURE: 133 MMHG | OXYGEN SATURATION: 95 %

## 2019-11-26 LAB
APAP SERPL-MCNC: <2 UG/ML — LOW (ref 10–30)
ETHANOL SERPL-MCNC: 232 MG/DL — HIGH (ref 0–10)
SALICYLATES SERPL-MCNC: 2.2 MG/DL — LOW (ref 2.8–20)

## 2019-11-26 PROCEDURE — 99285 EMERGENCY DEPT VISIT HI MDM: CPT | Mod: 25

## 2019-11-26 PROCEDURE — 96372 THER/PROPH/DIAG INJ SC/IM: CPT

## 2019-11-26 PROCEDURE — 83735 ASSAY OF MAGNESIUM: CPT

## 2019-11-26 PROCEDURE — 36415 COLL VENOUS BLD VENIPUNCTURE: CPT

## 2019-11-26 PROCEDURE — 84702 CHORIONIC GONADOTROPIN TEST: CPT

## 2019-11-26 PROCEDURE — 96374 THER/PROPH/DIAG INJ IV PUSH: CPT

## 2019-11-26 PROCEDURE — 80053 COMPREHEN METABOLIC PANEL: CPT

## 2019-11-26 PROCEDURE — 84100 ASSAY OF PHOSPHORUS: CPT

## 2019-11-26 PROCEDURE — 82962 GLUCOSE BLOOD TEST: CPT

## 2019-11-26 PROCEDURE — 85027 COMPLETE CBC AUTOMATED: CPT

## 2019-11-26 PROCEDURE — 80307 DRUG TEST PRSMV CHEM ANLYZR: CPT

## 2019-11-26 RX ADMIN — Medication 25 MILLIGRAM(S): at 07:24

## 2019-11-26 NOTE — CHART NOTE - NSCHARTNOTEFT_GEN_A_CORE
Care Coordination Assessment       Last Saved By:     Jeb Moura     Last Saved On:     11/26/2019 10:55  (ET)       Created By:     Jeb oMura     Created On:     11/26/2019 10:53  (ET)                                   DEMOGRAPHIC             Patient’s alternate phone number     None       Sources of information     Answers:     Patient               In what language do you prefer to discuss your healthcare?     Answers:     English               COGNITIVE/LEARNING             Mental Status     Answers:     Alert                   Oriented: Person                   Oriented: Place                   Oriented: Time               Factors that impact ability to learn     Answers:     None               Learning Preferences     Answers:     Verbal instruction               Other learners     Answers:     Spouse               ADMISSION HISTORY             Admitted From     Answers:     Home               Significant Prior Medical/Surgical History     Answers:     Mental Health Issues                   Other               Notes: Prior Medical/Surgical History         ETOH abuse       Functional Status Prior to Admission     Answers:     Independent               Services Present on Admission     Answers:     None               FINANCIAL             Does patient have financial concerns for discharge?     Answers:     None               LIVING ARRANGEMENTS/SUPPORT             Housing Environment     Answers:     Apartment               Sources of support/caregivers     Answers:     Spouse/Significant Other               DISCHARGE PLANNING             Potential Discharge Plan and Services     Answers:     Behavioral Health Services               ADVANCED DIRECTIVES             Does patient have Advanced Directives?     Answers:     No – Patient declines information               Advanced Directives Placed in Medical Record     Answers:     None               Advanced Directives requested from patient/other     Answers:     Not applicable               EMERGENCY CONTACTS OUTSIDE HOME             Emergency Contact     Answers:     Emergency Contact Name         Notes:     CALVIN CARTER           Emergency Contact Relationship         Notes:     Spouse           Emergency Contact Phone #         Notes:     [phone]       Notes: Emergency Contact:         No caregiver designated.       SCREENING             Patient completion of PHQ-2?     Answers:     Declined               Social Work Screen and Referral     Answers:     Current Substance Abuse                            Progress Notes       Last Saved By:     Jeb Moura     Last Saved On:     11/26/2019 10:46  (ET)       Created By:     Jeb Moura     Created On:     11/26/2019 10:46  (ET)                                   PROGRESS NOTE             Date & Time of Note     11/26/2019 10:46  (ET)       Notes         Per ED attending, patient medically cleared for discharge.  No need for psych consult; no risk for DTs.  LCSW met with patient, who was perseverative on discharge.  SBIRT screening completed.  Patient initially declined outpatient substance treatment referral.  Resource list provided.  LCSW contacted patient's spouse, Calvin Carter/ [phone] with patient.  Spouse agreeable to patient discharging, and will  patient.  Patient made aware of how she was admitted to the ED.  Patient stated she "blacked out", and did not recall the incident.  Patient agreeable to outpatient rehab referral.  Referral made to [facility]: [address] [place], [address], [phone] for 12/04/2019, 7:00 PM, with Yajaira.  Patient agreeable to plan.

## 2019-12-16 ENCOUNTER — EMERGENCY (EMERGENCY)
Facility: HOSPITAL | Age: 34
LOS: 1 days | Discharge: HOME | End: 2019-12-16
Attending: EMERGENCY MEDICINE
Payer: MEDICAID

## 2019-12-16 VITALS
HEIGHT: 67 IN | OXYGEN SATURATION: 98 % | TEMPERATURE: 98 F | RESPIRATION RATE: 18 BRPM | WEIGHT: 184.97 LBS | HEART RATE: 118 BPM | SYSTOLIC BLOOD PRESSURE: 156 MMHG | DIASTOLIC BLOOD PRESSURE: 99 MMHG

## 2019-12-16 LAB
ALBUMIN SERPL ELPH-MCNC: 4 G/DL — SIGNIFICANT CHANGE UP (ref 3.5–5)
ALP SERPL-CCNC: 56 U/L — SIGNIFICANT CHANGE UP (ref 40–120)
ALT FLD-CCNC: 63 U/L DA — HIGH (ref 10–60)
ANION GAP SERPL CALC-SCNC: 12 MMOL/L — SIGNIFICANT CHANGE UP (ref 5–17)
APAP SERPL-MCNC: <2 UG/ML — LOW (ref 10–30)
AST SERPL-CCNC: 36 U/L — SIGNIFICANT CHANGE UP (ref 10–40)
BASE EXCESS BLDV CALC-SCNC: -1 MMOL/L — SIGNIFICANT CHANGE UP (ref -2–2)
BASOPHILS # BLD AUTO: 0.07 K/UL — SIGNIFICANT CHANGE UP (ref 0–0.2)
BASOPHILS NFR BLD AUTO: 0.6 % — SIGNIFICANT CHANGE UP (ref 0–2)
BILIRUB SERPL-MCNC: 0.2 MG/DL — SIGNIFICANT CHANGE UP (ref 0.2–1.2)
BLOOD GAS COMMENTS, VENOUS: SIGNIFICANT CHANGE UP
BUN SERPL-MCNC: 9 MG/DL — SIGNIFICANT CHANGE UP (ref 7–18)
CALCIUM SERPL-MCNC: 8.3 MG/DL — LOW (ref 8.4–10.5)
CHLORIDE SERPL-SCNC: 108 MMOL/L — SIGNIFICANT CHANGE UP (ref 96–108)
CK SERPL-CCNC: 133 U/L — SIGNIFICANT CHANGE UP (ref 21–215)
CO2 SERPL-SCNC: 23 MMOL/L — SIGNIFICANT CHANGE UP (ref 22–31)
CREAT SERPL-MCNC: 0.61 MG/DL — SIGNIFICANT CHANGE UP (ref 0.5–1.3)
EOSINOPHIL # BLD AUTO: 0.19 K/UL — SIGNIFICANT CHANGE UP (ref 0–0.5)
EOSINOPHIL NFR BLD AUTO: 1.7 % — SIGNIFICANT CHANGE UP (ref 0–6)
ETHANOL SERPL-MCNC: 403 MG/DL — HIGH (ref 0–10)
GLUCOSE SERPL-MCNC: 91 MG/DL — SIGNIFICANT CHANGE UP (ref 70–99)
HCG SERPL-ACNC: <1 MIU/ML — SIGNIFICANT CHANGE UP
HCO3 BLDV-SCNC: 25 MMOL/L — SIGNIFICANT CHANGE UP (ref 21–29)
HCT VFR BLD CALC: 47.6 % — HIGH (ref 34.5–45)
HGB BLD-MCNC: 15.9 G/DL — HIGH (ref 11.5–15.5)
HOROWITZ INDEX BLDV+IHG-RTO: 21 — SIGNIFICANT CHANGE UP
IMM GRANULOCYTES NFR BLD AUTO: 0.3 % — SIGNIFICANT CHANGE UP (ref 0–1.5)
LACTATE SERPL-SCNC: 3.7 MMOL/L — HIGH (ref 0.7–2)
LYMPHOCYTES # BLD AUTO: 39.4 % — SIGNIFICANT CHANGE UP (ref 13–44)
LYMPHOCYTES # BLD AUTO: 4.52 K/UL — HIGH (ref 1–3.3)
MCHC RBC-ENTMCNC: 31.4 PG — SIGNIFICANT CHANGE UP (ref 27–34)
MCHC RBC-ENTMCNC: 33.4 GM/DL — SIGNIFICANT CHANGE UP (ref 32–36)
MCV RBC AUTO: 93.9 FL — SIGNIFICANT CHANGE UP (ref 80–100)
MONOCYTES # BLD AUTO: 0.69 K/UL — SIGNIFICANT CHANGE UP (ref 0–0.9)
MONOCYTES NFR BLD AUTO: 6 % — SIGNIFICANT CHANGE UP (ref 2–14)
NEUTROPHILS # BLD AUTO: 5.96 K/UL — SIGNIFICANT CHANGE UP (ref 1.8–7.4)
NEUTROPHILS NFR BLD AUTO: 52 % — SIGNIFICANT CHANGE UP (ref 43–77)
NRBC # BLD: 0 /100 WBCS — SIGNIFICANT CHANGE UP (ref 0–0)
PCO2 BLDV: 47 MMHG — SIGNIFICANT CHANGE UP (ref 35–50)
PH BLDV: 7.34 — LOW (ref 7.35–7.45)
PLATELET # BLD AUTO: 367 K/UL — SIGNIFICANT CHANGE UP (ref 150–400)
PO2 BLDV: 45 MMHG — SIGNIFICANT CHANGE UP (ref 25–45)
POTASSIUM SERPL-MCNC: 3.7 MMOL/L — SIGNIFICANT CHANGE UP (ref 3.5–5.3)
POTASSIUM SERPL-SCNC: 3.7 MMOL/L — SIGNIFICANT CHANGE UP (ref 3.5–5.3)
PROT SERPL-MCNC: 8.2 G/DL — SIGNIFICANT CHANGE UP (ref 6–8.3)
RBC # BLD: 5.07 M/UL — SIGNIFICANT CHANGE UP (ref 3.8–5.2)
RBC # FLD: 13.8 % — SIGNIFICANT CHANGE UP (ref 10.3–14.5)
SALICYLATES SERPL-MCNC: 4 MG/DL — SIGNIFICANT CHANGE UP (ref 2.8–20)
SAO2 % BLDV: 73 % — SIGNIFICANT CHANGE UP (ref 67–88)
SODIUM SERPL-SCNC: 143 MMOL/L — SIGNIFICANT CHANGE UP (ref 135–145)
WBC # BLD: 11.46 K/UL — HIGH (ref 3.8–10.5)
WBC # FLD AUTO: 11.46 K/UL — HIGH (ref 3.8–10.5)

## 2019-12-16 PROCEDURE — 70450 CT HEAD/BRAIN W/O DYE: CPT | Mod: 26

## 2019-12-16 PROCEDURE — 99285 EMERGENCY DEPT VISIT HI MDM: CPT

## 2019-12-16 RX ORDER — MAGNESIUM SULFATE 500 MG/ML
2 VIAL (ML) INJECTION ONCE
Refills: 0 | Status: COMPLETED | OUTPATIENT
Start: 2019-12-16 | End: 2019-12-16

## 2019-12-16 RX ORDER — SODIUM CHLORIDE 9 MG/ML
1000 INJECTION, SOLUTION INTRAVENOUS ONCE
Refills: 0 | Status: COMPLETED | OUTPATIENT
Start: 2019-12-16 | End: 2019-12-16

## 2019-12-16 RX ORDER — HALOPERIDOL DECANOATE 100 MG/ML
5 INJECTION INTRAMUSCULAR ONCE
Refills: 0 | Status: COMPLETED | OUTPATIENT
Start: 2019-12-16 | End: 2019-12-16

## 2019-12-16 RX ORDER — KETAMINE HYDROCHLORIDE 100 MG/ML
300 INJECTION INTRAMUSCULAR; INTRAVENOUS ONCE
Refills: 0 | Status: DISCONTINUED | OUTPATIENT
Start: 2019-12-16 | End: 2019-12-16

## 2019-12-16 RX ORDER — DIPHENHYDRAMINE HCL 50 MG
50 CAPSULE ORAL ONCE
Refills: 0 | Status: COMPLETED | OUTPATIENT
Start: 2019-12-16 | End: 2019-12-16

## 2019-12-16 RX ADMIN — KETAMINE HYDROCHLORIDE 300 MILLIGRAM(S): 100 INJECTION INTRAMUSCULAR; INTRAVENOUS at 17:40

## 2019-12-16 RX ADMIN — Medication 50 MILLIGRAM(S): at 18:20

## 2019-12-16 RX ADMIN — HALOPERIDOL DECANOATE 5 MILLIGRAM(S): 100 INJECTION INTRAMUSCULAR at 19:00

## 2019-12-16 RX ADMIN — Medication 50 GRAM(S): at 18:33

## 2019-12-16 RX ADMIN — SODIUM CHLORIDE 1000 MILLILITER(S): 9 INJECTION, SOLUTION INTRAVENOUS at 17:15

## 2019-12-16 RX ADMIN — Medication 2 MILLIGRAM(S): at 18:18

## 2019-12-16 NOTE — ED ADULT NURSE NOTE - NSIMPLEMENTINTERV_GEN_ALL_ED
Implemented All Fall with Harm Risk Interventions:  [place] to call system. Call bell, personal items and telephone within reach. Instruct patient to call for assistance. Room bathroom lighting operational. Non-slip footwear when patient is off stretcher. Physically safe environment: no spills, clutter or unnecessary equipment. Stretcher in lowest position, wheels locked, appropriate side rails in place. Provide visual cue, wrist band, yellow gown, etc. Monitor gait and stability. Monitor for mental status changes and reorient to person, place, and time. Review medications for side effects contributing to fall risk. Reinforce activity limits and safety measures with patient and family. Provide visual clues: red socks.

## 2019-12-16 NOTE — CHART NOTE - NSCHARTNOTEFT_GEN_A_CORE
Pt is a 34 yr old female with hx of seizure, depression and etoh abuse. Pt was brought to the ED via EMS and [facility] for severe agitation, intoxication  and seizure. As per Pt's , Pt has been drinking for the past 5 days and today she fell and hit her head after having seizure. Pt was very combative.. Katia is not able to assess due to agitation. Pt is currently sleeping . Resources left in Pt's chart . Pt 's d/c disposition is unknown at this time.

## 2019-12-16 NOTE — ED PROVIDER NOTE - NSFOLLOWUPCLINICS_GEN_ALL_ED_FT
[facility]  [address] [place] [address]  Phone: [phone]  Fax: [phone]  Follow Up Time:

## 2019-12-16 NOTE — ED PROVIDER NOTE - OBJECTIVE STATEMENT
34 yr old female with hx of seizure d/o, depression and etoh abuse presents to ed via ems and [employer] for severe agitation, intox and seizure. per family drinking past 5 days and today fell hit head after having her typical seizure where eyes roll back.  pt very combative.  last drink tequila 1 hr pta. no od, unable to obtain hx of due to agitation

## 2019-12-16 NOTE — ED PROVIDER NOTE - PATIENT PORTAL LINK FT
You can access the FollowMyHealth Patient Portal offered by [facility] by registering at the following website: [email or web address]. By joining [facility]’s FollowMyHealth portal, you will also be able to view your health information using other applications (apps) compatible with our system.

## 2019-12-16 NOTE — ED PROVIDER NOTE - CLINICAL SUMMARY MEDICAL DECISION MAKING FREE TEXT BOX
34 yr old female with hx of seizure d/o, depression and etoh abuse presents to ed via ems and [employer] for severe agitation, intox and seizure. per family drinking past 5 days and today fell hit head after having her typical seizure where eyes roll back.  pt very combative.  last drink tequila 1 hr pta. no od, unable to obtain hx of due to agitation    pt with etoh intox fall and seziure r/o ICH vs intox vs od, vs psych.  due to severe agitation limited exam and history will give ketamine IM 4mg/kg, cardiac montior, ct head, labs, ua, utox, fluids, monitor.

## 2019-12-16 NOTE — ED ADULT NURSE REASSESSMENT NOTE - NS ED NURSE REASSESS COMMENT FT1
sleeping, breathing normal, with side rails up , on yellow gown, pt close to nursed station. Will cont monitor .

## 2019-12-16 NOTE — ED PROVIDER NOTE - PROGRESS NOTE DETAILS
Dominguez: initially pt require ketamine to calm pt down.  all return retun and etoh level elevated.  then became agitated again.  ativan haldol and benadryl cocktail given.  no longer agitatedd.  hemodynamically stable.  continue to monitor.  s/o to dr rao for re-assessment and sobriety Samuel DO: Pt is clinically sober, awake, normal agit. A&O x 3. Will repeat lactate. Baker DO: Pt is oriented to self, , current date, Pt is clinically sober. Denies suicidal ideation, refused detox, denies being un-domiciled, refused  consult.  is in attendance and threatened me with litigation to obtain psych consult. Pt denies feeling depressed or anxious or has any intentions to hurt herself or others and is refusing to have consultation with psych.  See your doctor as soon as possible (within 1-2 days).   If you need further assistance for appointments you can contact the [facility] Care Coordinator at [phone] or the [facility] Patient Access Services helpline at [phone] to find names/contact #s for a practitioner to follow up with.  Bring your discharge papers / test results with you to any follow up appointments.   In addition our outpatient [facility] is located at [address] [place] [address], tel: [phone]. Baker DO: Pt is oriented to self, , current date, Pt is clinically sober. Denies suicidal ideation, refused detox, denies being un-domiciled, refused  consult.  is in attendance and threatened me with litigation to obtain psych consult. Pt denies feeling depressed or anxious or has any intentions to hurt herself or others and is refusing to have consultation with psych. Sonia HAAS ED nursing director present.  See your doctor as soon as possible (within 1-2 days).   If you need further assistance for appointments you can contact the [facility] Care Coordinator at [phone] or the [facility] Patient Access Services helpline at [phone] to find names/contact #s for a practitioner to follow up with.  Bring your discharge papers / test results with you to any follow up appointments.   In addition our outpatient [facility] is located at [address] [place] [address], tel: [phone].

## 2019-12-17 VITALS
RESPIRATION RATE: 18 BRPM | TEMPERATURE: 98 F | DIASTOLIC BLOOD PRESSURE: 94 MMHG | HEART RATE: 89 BPM | OXYGEN SATURATION: 97 % | SYSTOLIC BLOOD PRESSURE: 140 MMHG

## 2019-12-17 LAB
AMPHET UR-MCNC: NEGATIVE — SIGNIFICANT CHANGE UP
APPEARANCE UR: CLEAR — SIGNIFICANT CHANGE UP
BACTERIA # UR AUTO: ABNORMAL /HPF
BARBITURATES UR SCN-MCNC: NEGATIVE — SIGNIFICANT CHANGE UP
BENZODIAZ UR-MCNC: NEGATIVE — SIGNIFICANT CHANGE UP
BILIRUB UR-MCNC: NEGATIVE — SIGNIFICANT CHANGE UP
COCAINE METAB.OTHER UR-MCNC: NEGATIVE — SIGNIFICANT CHANGE UP
COLOR SPEC: YELLOW — SIGNIFICANT CHANGE UP
DIFF PNL FLD: NEGATIVE — SIGNIFICANT CHANGE UP
EPI CELLS # UR: ABNORMAL /HPF
GLUCOSE UR QL: NEGATIVE — SIGNIFICANT CHANGE UP
KETONES UR-MCNC: NEGATIVE — SIGNIFICANT CHANGE UP
LACTATE SERPL-SCNC: 2.6 MMOL/L — HIGH (ref 0.7–2)
LEUKOCYTE ESTERASE UR-ACNC: NEGATIVE — SIGNIFICANT CHANGE UP
METHADONE UR-MCNC: NEGATIVE — SIGNIFICANT CHANGE UP
NITRITE UR-MCNC: NEGATIVE — SIGNIFICANT CHANGE UP
OPIATES UR-MCNC: NEGATIVE — SIGNIFICANT CHANGE UP
PCP SPEC-MCNC: SIGNIFICANT CHANGE UP
PCP UR-MCNC: NEGATIVE — SIGNIFICANT CHANGE UP
PH UR: 5 — SIGNIFICANT CHANGE UP (ref 5–8)
PROT UR-MCNC: 30 MG/DL
RBC CASTS # UR COMP ASSIST: SIGNIFICANT CHANGE UP /HPF (ref 0–2)
SP GR SPEC: 1.01 — SIGNIFICANT CHANGE UP (ref 1.01–1.02)
THC UR QL: NEGATIVE — SIGNIFICANT CHANGE UP
UROBILINOGEN FLD QL: NEGATIVE — SIGNIFICANT CHANGE UP
WBC UR QL: SIGNIFICANT CHANGE UP /HPF (ref 0–5)

## 2019-12-17 PROCEDURE — 87086 URINE CULTURE/COLONY COUNT: CPT

## 2019-12-17 PROCEDURE — 83605 ASSAY OF LACTIC ACID: CPT

## 2019-12-17 PROCEDURE — 82803 BLOOD GASES ANY COMBINATION: CPT

## 2019-12-17 PROCEDURE — 99285 EMERGENCY DEPT VISIT HI MDM: CPT | Mod: 25

## 2019-12-17 PROCEDURE — 96375 TX/PRO/DX INJ NEW DRUG ADDON: CPT

## 2019-12-17 PROCEDURE — 80307 DRUG TEST PRSMV CHEM ANLYZR: CPT

## 2019-12-17 PROCEDURE — 85027 COMPLETE CBC AUTOMATED: CPT

## 2019-12-17 PROCEDURE — 36415 COLL VENOUS BLD VENIPUNCTURE: CPT

## 2019-12-17 PROCEDURE — 81001 URINALYSIS AUTO W/SCOPE: CPT

## 2019-12-17 PROCEDURE — 80053 COMPREHEN METABOLIC PANEL: CPT

## 2019-12-17 PROCEDURE — 96374 THER/PROPH/DIAG INJ IV PUSH: CPT

## 2019-12-17 PROCEDURE — 82550 ASSAY OF CK (CPK): CPT

## 2019-12-17 PROCEDURE — 70450 CT HEAD/BRAIN W/O DYE: CPT

## 2019-12-17 PROCEDURE — 84702 CHORIONIC GONADOTROPIN TEST: CPT

## 2019-12-17 RX ORDER — SODIUM CHLORIDE 9 MG/ML
1000 INJECTION INTRAMUSCULAR; INTRAVENOUS; SUBCUTANEOUS ONCE
Refills: 0 | Status: COMPLETED | OUTPATIENT
Start: 2019-12-17 | End: 2019-12-17

## 2019-12-17 RX ADMIN — SODIUM CHLORIDE 1000 MILLILITER(S): 9 INJECTION INTRAMUSCULAR; INTRAVENOUS; SUBCUTANEOUS at 00:41

## 2019-12-17 NOTE — ED ADULT NURSE REASSESSMENT NOTE - NS ED NURSE REASSESS COMMENT FT1
fully awake , alert , oriented x 3 drinking water able to consumed 2 pitchers, walking with steady gait. fully awake , alert , oriented x 3 drinking water able to consumed 2 pitchers, walking with steady gait. When ask about her alcohol use pt states drinks socially during weekends with friends, able to consumed 20 shots. Denies any suicidal thought or depression. fully awake , alert , oriented x 3 drinking water able to consumed 2 pitchers, walking with steady gait. No tremors noted to fingers, skin dry and warm. When ask about her alcohol use pt states drinks socially during weekends with friends, able to consumed 20 shots at times like once or twice a month.  Denies any suicidal thought or depression.

## 2019-12-18 LAB
CULTURE RESULTS: SIGNIFICANT CHANGE UP
SPECIMEN SOURCE: SIGNIFICANT CHANGE UP

## 2020-02-07 ENCOUNTER — INPATIENT (INPATIENT)
Facility: HOSPITAL | Age: 35
LOS: 2 days | Discharge: LEFT BEFORE DISCHARGE | DRG: 894 | End: 2020-02-10
Attending: INTERNAL MEDICINE | Admitting: INTERNAL MEDICINE
Payer: MEDICAID

## 2020-02-07 VITALS — HEIGHT: 67 IN | WEIGHT: 250 LBS

## 2020-02-07 RX ORDER — DIPHENHYDRAMINE HCL 50 MG
50 CAPSULE ORAL ONCE
Refills: 0 | Status: COMPLETED | OUTPATIENT
Start: 2020-02-07 | End: 2020-02-07

## 2020-02-07 RX ORDER — HALOPERIDOL DECANOATE 100 MG/ML
5 INJECTION INTRAMUSCULAR ONCE
Refills: 0 | Status: COMPLETED | OUTPATIENT
Start: 2020-02-07 | End: 2020-02-07

## 2020-02-07 RX ADMIN — Medication 50 MILLIGRAM(S): at 20:46

## 2020-02-07 RX ADMIN — Medication 2 MILLIGRAM(S): at 20:46

## 2020-02-07 RX ADMIN — HALOPERIDOL DECANOATE 5 MILLIGRAM(S): 100 INJECTION INTRAMUSCULAR at 20:46

## 2020-02-07 NOTE — ED ADULT NURSE NOTE - NSIMPLEMENTINTERV_GEN_ALL_ED
Implemented All Fall Risk Interventions:  [place] to call system. Call bell, personal items and telephone within reach. Instruct patient to call for assistance. Room bathroom lighting operational. Non-slip footwear when patient is off stretcher. Physically safe environment: no spills, clutter or unnecessary equipment. Stretcher in lowest position, wheels locked, appropriate side rails in place. Provide visual cue, wrist band, yellow gown, etc. Monitor gait and stability. Monitor for mental status changes and reorient to person, place, and time. Review medications for side effects contributing to fall risk. Reinforce activity limits and safety measures with patient and family.

## 2020-02-08 DIAGNOSIS — E86.0 DEHYDRATION: ICD-10-CM

## 2020-02-08 DIAGNOSIS — D69.6 THROMBOCYTOPENIA, UNSPECIFIED: ICD-10-CM

## 2020-02-08 DIAGNOSIS — R74.8 ABNORMAL LEVELS OF OTHER SERUM ENZYMES: ICD-10-CM

## 2020-02-08 DIAGNOSIS — E16.2 HYPOGLYCEMIA, UNSPECIFIED: ICD-10-CM

## 2020-02-08 DIAGNOSIS — F10.239 ALCOHOL DEPENDENCE WITH WITHDRAWAL, UNSPECIFIED: ICD-10-CM

## 2020-02-08 DIAGNOSIS — E87.1 HYPO-OSMOLALITY AND HYPONATREMIA: ICD-10-CM

## 2020-02-08 DIAGNOSIS — F32.9 MAJOR DEPRESSIVE DISORDER, SINGLE EPISODE, UNSPECIFIED: ICD-10-CM

## 2020-02-08 DIAGNOSIS — Z29.9 ENCOUNTER FOR PROPHYLACTIC MEASURES, UNSPECIFIED: ICD-10-CM

## 2020-02-08 DIAGNOSIS — F10.929 ALCOHOL USE, UNSPECIFIED WITH INTOXICATION, UNSPECIFIED: ICD-10-CM

## 2020-02-08 LAB
ALBUMIN SERPL ELPH-MCNC: 3.4 G/DL — LOW (ref 3.5–5)
ALBUMIN SERPL ELPH-MCNC: 3.6 G/DL — SIGNIFICANT CHANGE UP (ref 3.5–5)
ALP SERPL-CCNC: 48 U/L — SIGNIFICANT CHANGE UP (ref 40–120)
ALP SERPL-CCNC: 53 U/L — SIGNIFICANT CHANGE UP (ref 40–120)
ALT FLD-CCNC: 83 U/L DA — HIGH (ref 10–60)
ALT FLD-CCNC: 92 U/L DA — HIGH (ref 10–60)
ANION GAP SERPL CALC-SCNC: 12 MMOL/L — SIGNIFICANT CHANGE UP (ref 5–17)
ANION GAP SERPL CALC-SCNC: 13 MMOL/L — SIGNIFICANT CHANGE UP (ref 5–17)
APAP SERPL-MCNC: <2 UG/ML — LOW (ref 10–30)
AST SERPL-CCNC: 83 U/L — HIGH (ref 10–40)
AST SERPL-CCNC: 95 U/L — HIGH (ref 10–40)
BASOPHILS # BLD AUTO: 0.02 K/UL — SIGNIFICANT CHANGE UP (ref 0–0.2)
BASOPHILS NFR BLD AUTO: 0.2 % — SIGNIFICANT CHANGE UP (ref 0–2)
BILIRUB DIRECT SERPL-MCNC: 0.1 MG/DL — SIGNIFICANT CHANGE UP (ref 0–0.2)
BILIRUB INDIRECT FLD-MCNC: 0.6 MG/DL — SIGNIFICANT CHANGE UP (ref 0.2–1)
BILIRUB SERPL-MCNC: 0.7 MG/DL — SIGNIFICANT CHANGE UP (ref 0.2–1.2)
BILIRUB SERPL-MCNC: 0.7 MG/DL — SIGNIFICANT CHANGE UP (ref 0.2–1.2)
BUN SERPL-MCNC: 12 MG/DL — SIGNIFICANT CHANGE UP (ref 7–18)
BUN SERPL-MCNC: 14 MG/DL — SIGNIFICANT CHANGE UP (ref 7–18)
CALCIUM SERPL-MCNC: 7.9 MG/DL — LOW (ref 8.4–10.5)
CALCIUM SERPL-MCNC: 8 MG/DL — LOW (ref 8.4–10.5)
CHLORIDE SERPL-SCNC: 101 MMOL/L — SIGNIFICANT CHANGE UP (ref 96–108)
CHLORIDE SERPL-SCNC: 102 MMOL/L — SIGNIFICANT CHANGE UP (ref 96–108)
CO2 SERPL-SCNC: 19 MMOL/L — LOW (ref 22–31)
CO2 SERPL-SCNC: 21 MMOL/L — LOW (ref 22–31)
CREAT SERPL-MCNC: 0.42 MG/DL — LOW (ref 0.5–1.3)
CREAT SERPL-MCNC: 0.5 MG/DL — SIGNIFICANT CHANGE UP (ref 0.5–1.3)
EOSINOPHIL # BLD AUTO: 0.16 K/UL — SIGNIFICANT CHANGE UP (ref 0–0.5)
EOSINOPHIL NFR BLD AUTO: 1.4 % — SIGNIFICANT CHANGE UP (ref 0–6)
ETHANOL SERPL-MCNC: 175 MG/DL — HIGH (ref 0–10)
GLUCOSE SERPL-MCNC: 125 MG/DL — HIGH (ref 70–99)
GLUCOSE SERPL-MCNC: 62 MG/DL — LOW (ref 70–99)
HCG SERPL-ACNC: <1 MIU/ML — SIGNIFICANT CHANGE UP
HCT VFR BLD CALC: 41 % — SIGNIFICANT CHANGE UP (ref 34.5–45)
HGB BLD-MCNC: 13.8 G/DL — SIGNIFICANT CHANGE UP (ref 11.5–15.5)
IMM GRANULOCYTES NFR BLD AUTO: 0.3 % — SIGNIFICANT CHANGE UP (ref 0–1.5)
LYMPHOCYTES # BLD AUTO: 2.33 K/UL — SIGNIFICANT CHANGE UP (ref 1–3.3)
LYMPHOCYTES # BLD AUTO: 20.3 % — SIGNIFICANT CHANGE UP (ref 13–44)
MAGNESIUM SERPL-MCNC: 1.8 MG/DL — SIGNIFICANT CHANGE UP (ref 1.6–2.6)
MCHC RBC-ENTMCNC: 32.2 PG — SIGNIFICANT CHANGE UP (ref 27–34)
MCHC RBC-ENTMCNC: 33.7 GM/DL — SIGNIFICANT CHANGE UP (ref 32–36)
MCV RBC AUTO: 95.6 FL — SIGNIFICANT CHANGE UP (ref 80–100)
MONOCYTES # BLD AUTO: 0.55 K/UL — SIGNIFICANT CHANGE UP (ref 0–0.9)
MONOCYTES NFR BLD AUTO: 4.8 % — SIGNIFICANT CHANGE UP (ref 2–14)
NEUTROPHILS # BLD AUTO: 8.36 K/UL — HIGH (ref 1.8–7.4)
NEUTROPHILS NFR BLD AUTO: 73 % — SIGNIFICANT CHANGE UP (ref 43–77)
NRBC # BLD: 0 /100 WBCS — SIGNIFICANT CHANGE UP (ref 0–0)
OSMOLALITY SERPL: 283 MOSMOL/KG — SIGNIFICANT CHANGE UP (ref 275–300)
PHOSPHATE SERPL-MCNC: 2.6 MG/DL — SIGNIFICANT CHANGE UP (ref 2.5–4.5)
PLATELET # BLD AUTO: 117 K/UL — LOW (ref 150–400)
POTASSIUM SERPL-MCNC: 3.6 MMOL/L — SIGNIFICANT CHANGE UP (ref 3.5–5.3)
POTASSIUM SERPL-MCNC: 3.8 MMOL/L — SIGNIFICANT CHANGE UP (ref 3.5–5.3)
POTASSIUM SERPL-SCNC: 3.6 MMOL/L — SIGNIFICANT CHANGE UP (ref 3.5–5.3)
POTASSIUM SERPL-SCNC: 3.8 MMOL/L — SIGNIFICANT CHANGE UP (ref 3.5–5.3)
PROT SERPL-MCNC: 6.5 G/DL — SIGNIFICANT CHANGE UP (ref 6–8.3)
PROT SERPL-MCNC: 7 G/DL — SIGNIFICANT CHANGE UP (ref 6–8.3)
RBC # BLD: 4.29 M/UL — SIGNIFICANT CHANGE UP (ref 3.8–5.2)
RBC # FLD: 15 % — HIGH (ref 10.3–14.5)
SALICYLATES SERPL-MCNC: 4.6 MG/DL — SIGNIFICANT CHANGE UP (ref 2.8–20)
SODIUM SERPL-SCNC: 134 MMOL/L — LOW (ref 135–145)
SODIUM SERPL-SCNC: 134 MMOL/L — LOW (ref 135–145)
WBC # BLD: 11.46 K/UL — HIGH (ref 3.8–10.5)
WBC # FLD AUTO: 11.46 K/UL — HIGH (ref 3.8–10.5)

## 2020-02-08 PROCEDURE — 99283 EMERGENCY DEPT VISIT LOW MDM: CPT

## 2020-02-08 PROCEDURE — 99223 1ST HOSP IP/OBS HIGH 75: CPT | Mod: GC

## 2020-02-08 RX ORDER — FOLIC ACID 0.8 MG
1 TABLET ORAL DAILY
Refills: 0 | Status: DISCONTINUED | OUTPATIENT
Start: 2020-02-08 | End: 2020-02-10

## 2020-02-08 RX ORDER — MAGNESIUM SULFATE 500 MG/ML
1 VIAL (ML) INJECTION ONCE
Refills: 0 | Status: COMPLETED | OUTPATIENT
Start: 2020-02-08 | End: 2020-02-08

## 2020-02-08 RX ORDER — PANTOPRAZOLE SODIUM 20 MG/1
40 TABLET, DELAYED RELEASE ORAL
Refills: 0 | Status: DISCONTINUED | OUTPATIENT
Start: 2020-02-08 | End: 2020-02-10

## 2020-02-08 RX ORDER — SODIUM CHLORIDE 9 MG/ML
1000 INJECTION INTRAMUSCULAR; INTRAVENOUS; SUBCUTANEOUS ONCE
Refills: 0 | Status: COMPLETED | OUTPATIENT
Start: 2020-02-08 | End: 2020-02-08

## 2020-02-08 RX ORDER — ENOXAPARIN SODIUM 100 MG/ML
40 INJECTION SUBCUTANEOUS DAILY
Refills: 0 | Status: DISCONTINUED | OUTPATIENT
Start: 2020-02-08 | End: 2020-02-10

## 2020-02-08 RX ORDER — SODIUM CHLORIDE 9 MG/ML
1000 INJECTION INTRAMUSCULAR; INTRAVENOUS; SUBCUTANEOUS
Refills: 0 | Status: DISCONTINUED | OUTPATIENT
Start: 2020-02-08 | End: 2020-02-10

## 2020-02-08 RX ORDER — THIAMINE MONONITRATE (VIT B1) 100 MG
500 TABLET ORAL DAILY
Refills: 0 | Status: COMPLETED | OUTPATIENT
Start: 2020-02-08 | End: 2020-02-10

## 2020-02-08 RX ORDER — CITALOPRAM 10 MG/1
20 TABLET, FILM COATED ORAL DAILY
Refills: 0 | Status: DISCONTINUED | OUTPATIENT
Start: 2020-02-08 | End: 2020-02-10

## 2020-02-08 RX ADMIN — Medication 50 MILLIGRAM(S): at 08:04

## 2020-02-08 RX ADMIN — Medication 100 GRAM(S): at 21:45

## 2020-02-08 RX ADMIN — Medication 50 MILLIGRAM(S): at 21:45

## 2020-02-08 RX ADMIN — SODIUM CHLORIDE 1000 MILLILITER(S): 9 INJECTION INTRAMUSCULAR; INTRAVENOUS; SUBCUTANEOUS at 08:04

## 2020-02-08 RX ADMIN — SODIUM CHLORIDE 1000 MILLILITER(S): 9 INJECTION INTRAMUSCULAR; INTRAVENOUS; SUBCUTANEOUS at 09:04

## 2020-02-08 RX ADMIN — Medication 2 MILLIGRAM(S): at 16:13

## 2020-02-08 RX ADMIN — Medication 2 MILLIGRAM(S): at 08:04

## 2020-02-08 RX ADMIN — Medication 2 MILLIGRAM(S): at 21:45

## 2020-02-08 NOTE — H&P ADULT - PROBLEM SELECTOR PLAN 7
IMPROVE VTE Individual Risk Assessment   RISK                                                          Points  [  ] Previous VTE                                                3  [  ] Thrombophilia                                             2  [  ] Lower limb paralysis                                    2        (unable to hold up >15 seconds)    [  ] Current Cancer                                             2         (within 6 months)  [  x] Immobilization > 24 hrs                              1  [  ] ICU/CCU stay > 24 hours                            1  [ ] Age > 60                                                    1  IMPROVE VTE Score _________1   Lovenox for DVT prophylaxis Pt denies suicidal ideation  Continue home dose of escitalopram

## 2020-02-08 NOTE — ED PROVIDER NOTE - PROGRESS NOTE DETAILS
ambulatory in the ed with steady gait. drinking water.  at bedside. states patient has been having suicidal ideation. asking for her to be evaluated by psych. states pt has history of suicidal attempts in the past. meza: patient signed out by Dr. kitchen at 8AM. ETOH level 175. Patient given librium and ativan. Tremulous on exam. concern for etoh withdrawal. Will admit. Denies any SI/HI. Agreeable for admission. PMD: Clayton Shepard.

## 2020-02-08 NOTE — H&P ADULT - PROBLEM SELECTOR PLAN 6
Pt denies suicidal ideation  Continue home dose of escitalopram Platelet level:  117  Likely secondary to chronic alcohol abuse  Monitor platelet level

## 2020-02-08 NOTE — ED PROVIDER NOTE - CLINICAL SUMMARY MEDICAL DECISION MAKING FREE TEXT BOX
35 year old female with etoh intoxication. PE as above.  no signs of trauma. ambulatory with steady gait at time of discharge. f/u with PMD. advised etoh cessation. return precautions discussed.

## 2020-02-08 NOTE — H&P ADULT - PROBLEM SELECTOR PLAN 5
Platelet level:  117  Likely secondary to chronic alcohol abuse  Monitor platelet level AST:95  ALT:92  F/u US abdomen  F/u hepatitis panel

## 2020-02-08 NOTE — H&P ADULT - PROBLEM SELECTOR PLAN 4
AST:95  ALT:92  F/u US abdomen  F/u hepatitis panel Blood glucose: 65  Start thiamin before supplementing glucose  Monitor Blood glucose

## 2020-02-08 NOTE — H&P ADULT - NEUROLOGICAL DETAILS
sensation intact/superficial reflexes intact/normal strength/deep reflexes intact/cranial nerves intact/alert and oriented x 3

## 2020-02-08 NOTE — ED PROVIDER NOTE - PATIENT PORTAL LINK FT
You can access the FollowMyHealth Patient Portal offered by [facility] by registering at the following website: [email or web address]. By joining [facility]’s FollowMyHealth portal, you will also be able to view your health information using other applications (apps) compatible with our system.

## 2020-02-08 NOTE — H&P ADULT - PROBLEM SELECTOR PLAN 8
IMPROVE VTE Individual Risk Assessment   RISK                                                          Points  [  ] Previous VTE                                                3  [  ] Thrombophilia                                             2  [  ] Lower limb paralysis                                    2        (unable to hold up >15 seconds)    [  ] Current Cancer                                             2         (within 6 months)  [  x] Immobilization > 24 hrs                              1  [  ] ICU/CCU stay > 24 hours                            1  [ ] Age > 60                                                    1  IMPROVE VTE Score _________1   Lovenox for DVT prophylaxis

## 2020-02-08 NOTE — H&P ADULT - NSHPSOCIALHISTORY_GEN_ALL_CORE
Pt lives with her . She stated that she has been binge drinking every week since long time. She continuously drinks for 3-4 days when she binge drinks. She is an active smoker. She has been smoking about 10-12 sticks a day for more than 15 years.  works as a  at transportation. Pt lives with her . She stated that she has been binge drinking every week since long time. She continuously drinks for 3-4 days when she binge drinks. She is an active smoker. She has been smoking about 10-12 sticks a day for more than 15 years.  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

## 2020-02-08 NOTE — ED PROVIDER NOTE - OBJECTIVE STATEMENT
35 year old female denies [facility] BIBA for etoh intoxication. denies all complaints at this time.

## 2020-02-08 NOTE — H&P ADULT - NSHPLABSRESULTS_GEN_ALL_CORE
13.8   11.46 )-----------( 117      ( 08 Feb 2020 07:29 )             41.0       02-08    134<L>  |  101  |  14  ----------------------------<  62<L>  3.6   |  21<L>  |  0.42<L>    Ca    7.9<L>      08 Feb 2020 07:29    TPro  7.0  /  Alb  3.6  /  TBili  0.7  /  DBili  x   /  AST  95<H>  /  ALT  92<H>  /  AlkPhos  53  02-08

## 2020-02-08 NOTE — H&P ADULT - HISTORY OF PRESENT ILLNESS
Pt is a 34 y/o F with PMH of chronic alcohol abuse, Depression who presented with alcohol intoxication. Pt stated that she was brought by her  to ED as she was drinking alcohol heavily .  She stated that she has been binge drinking alcohol for a long time. Her last drink was last night. She was drinking a bottle of tequila continuously  since 3-4 days. She has feeling of guilt for drinking alcohol and has been trying to cut down. She mentioned that she has been going to psychiatrist and alcohol cessation program to get help for cutting down alcohol. She frequently feels depressed, has difficulty sleeping  and has been taking medication for depression. Denies suicidal ideation.  Pt denies all review of systems including headache, chest pain, shortness of breath, nausea, vomiting, abdominal pain, fever, chills. Pt is a 36 y/o F with PMH of chronic alcohol abuse ( multiple admissions for alcohol intoxication) , Depression who presented with alcohol intoxication. Pt stated that she was brought by her  to ED as she was drinking alcohol heavily .  She stated that she has been binge drinking alcohol for a long time. Her last drink was last night. She was drinking a bottle of tequila continuously  since 3-4 days. She has feeling of guilt for drinking alcohol and has been trying to cut down. She mentioned that she has been going to psychiatrist and alcohol cessation program to get help for cutting down alcohol. She frequently feels depressed, has difficulty sleeping  and has been taking medication for depression. Denies suicidal ideation.  Pt denies all review of systems including headache, chest pain, shortness of breath, nausea, vomiting, abdominal pain, fever, chills. Pt is a 36 y/o F with PMH of chronic alcohol abuse ( multiple admissions for alcohol intoxication) , alcohol withdrawal seizure, Depression who presented with alcohol intoxication. Pt stated that she was brought by her  to ED as she was drinking alcohol heavily.  She stated that she has been binge drinking alcohol for a long time. Her last drink was last night. She was drinking a bottle of tequila continuously  since 3-4 days. She has feeling of guilt for drinking alcohol and has been trying to cut down. She mentioned that she has been going to psychiatrist and alcohol cessation program to get help for cutting down alcohol. She frequently feels depressed, has difficulty sleeping  and has been taking medication for depression. Denies suicidal ideation.  Pt denies all review of systems including headache, chest pain, shortness of breath, nausea, vomiting, abdominal pain, fever, chills.

## 2020-02-08 NOTE — H&P ADULT - ATTENDING COMMENTS
Patient seen/evaluated at bedside 2/8/2020. I agree with the resident progress note/outlined plan of care. My independent findings and conclusions are documented.    Vital Signs Last 24 Hrs  T(C): 37.7 (08 Feb 2020 16:11), Max: 37.7 (08 Feb 2020 16:11)  T(F): 99.9 (08 Feb 2020 16:11), Max: 99.9 (08 Feb 2020 16:11)  HR: 95 (08 Feb 2020 16:11) (95 - 117)  BP: 141/76 (08 Feb 2020 16:11) (134/72 - 143/83)  RR: 17 (08 Feb 2020 16:11) (17 - 20)  SpO2: 98% (08 Feb 2020 16:11) (95% - 98%)

## 2020-02-08 NOTE — H&P ADULT - RS GEN PE MLT RESP DETAILS PC
airway patent/good air movement/clear to auscultation bilaterally/no chest wall tenderness/no intercostal retractions

## 2020-02-08 NOTE — ED PROVIDER NOTE - CARE PLAN
Principal Discharge DX:	Alcoholic intoxication without complication Principal Discharge DX:	Alcohol withdrawal  Secondary Diagnosis:	Alcoholic intoxication without complication

## 2020-02-08 NOTE — H&P ADULT - ASSESSMENT
Pt is a 36 y/o F with PMH of chronic alcohol abuse ( multiple admissions for alcohol intoxication) , alcohol withdrawal seizure, Depression who presented with alcohol intoxication.

## 2020-02-08 NOTE — H&P ADULT - PROBLEM SELECTOR PLAN 1
Monitor for alcohol withdrawal  Continue ativan prn 2mg IV q2 as per ANABELLA and ativan 2mg iv q4   Continue Thiamin, folic acid and IV fluid  Seizure precaution Monitor for alcohol withdrawal  Continue ativan prn 2mg IV q2 as per CIWA   Continue Librium 50 q8  Continue Thiamin, folic acid and IV fluid  Seizure precaution

## 2020-02-09 LAB
24R-OH-CALCIDIOL SERPL-MCNC: 8.4 NG/ML — LOW (ref 30–80)
ALBUMIN SERPL ELPH-MCNC: 3.1 G/DL — LOW (ref 3.5–5)
ALP SERPL-CCNC: 49 U/L — SIGNIFICANT CHANGE UP (ref 40–120)
ALT FLD-CCNC: 72 U/L DA — HIGH (ref 10–60)
ANION GAP SERPL CALC-SCNC: 9 MMOL/L — SIGNIFICANT CHANGE UP (ref 5–17)
APPEARANCE UR: CLEAR — SIGNIFICANT CHANGE UP
AST SERPL-CCNC: 71 U/L — HIGH (ref 10–40)
BILIRUB SERPL-MCNC: 1 MG/DL — SIGNIFICANT CHANGE UP (ref 0.2–1.2)
BILIRUB UR-MCNC: NEGATIVE — SIGNIFICANT CHANGE UP
BUN SERPL-MCNC: 13 MG/DL — SIGNIFICANT CHANGE UP (ref 7–18)
CALCIUM SERPL-MCNC: 8.3 MG/DL — LOW (ref 8.4–10.5)
CHLORIDE SERPL-SCNC: 101 MMOL/L — SIGNIFICANT CHANGE UP (ref 96–108)
CHOLEST SERPL-MCNC: 261 MG/DL — HIGH (ref 10–199)
CO2 SERPL-SCNC: 25 MMOL/L — SIGNIFICANT CHANGE UP (ref 22–31)
COLOR SPEC: YELLOW — SIGNIFICANT CHANGE UP
CREAT ?TM UR-MCNC: 179 MG/DL — SIGNIFICANT CHANGE UP
CREAT SERPL-MCNC: 0.38 MG/DL — LOW (ref 0.5–1.3)
DIFF PNL FLD: ABNORMAL
GLUCOSE SERPL-MCNC: 75 MG/DL — SIGNIFICANT CHANGE UP (ref 70–99)
GLUCOSE UR QL: NEGATIVE — SIGNIFICANT CHANGE UP
HAV IGM SER-ACNC: SIGNIFICANT CHANGE UP
HBA1C BLD-MCNC: 5.3 % — SIGNIFICANT CHANGE UP (ref 4–5.6)
HBV CORE IGM SER-ACNC: SIGNIFICANT CHANGE UP
HBV SURFACE AG SER-ACNC: SIGNIFICANT CHANGE UP
HCT VFR BLD CALC: 38.1 % — SIGNIFICANT CHANGE UP (ref 34.5–45)
HCV AB S/CO SERPL IA: 0.22 S/CO — SIGNIFICANT CHANGE UP (ref 0–0.99)
HCV AB SERPL-IMP: SIGNIFICANT CHANGE UP
HDLC SERPL-MCNC: 81 MG/DL — SIGNIFICANT CHANGE UP
HGB BLD-MCNC: 12.8 G/DL — SIGNIFICANT CHANGE UP (ref 11.5–15.5)
KETONES UR-MCNC: ABNORMAL
LEUKOCYTE ESTERASE UR-ACNC: NEGATIVE — SIGNIFICANT CHANGE UP
LIPID PNL WITH DIRECT LDL SERPL: 159 MG/DL — SIGNIFICANT CHANGE UP
MAGNESIUM SERPL-MCNC: 2.2 MG/DL — SIGNIFICANT CHANGE UP (ref 1.6–2.6)
MCHC RBC-ENTMCNC: 32 PG — SIGNIFICANT CHANGE UP (ref 27–34)
MCHC RBC-ENTMCNC: 33.6 GM/DL — SIGNIFICANT CHANGE UP (ref 32–36)
MCV RBC AUTO: 95.3 FL — SIGNIFICANT CHANGE UP (ref 80–100)
NITRITE UR-MCNC: NEGATIVE — SIGNIFICANT CHANGE UP
NRBC # BLD: 0 /100 WBCS — SIGNIFICANT CHANGE UP (ref 0–0)
OSMOLALITY UR: 936 MOS/KG — SIGNIFICANT CHANGE UP (ref 50–1200)
PH UR: 6 — SIGNIFICANT CHANGE UP (ref 5–8)
PHOSPHATE SERPL-MCNC: 2.9 MG/DL — SIGNIFICANT CHANGE UP (ref 2.5–4.5)
PLATELET # BLD AUTO: 91 K/UL — LOW (ref 150–400)
POTASSIUM SERPL-MCNC: 3.4 MMOL/L — LOW (ref 3.5–5.3)
POTASSIUM SERPL-SCNC: 3.4 MMOL/L — LOW (ref 3.5–5.3)
PROT ?TM UR-MCNC: 79 MG/DL — HIGH (ref 0–12)
PROT SERPL-MCNC: 6.3 G/DL — SIGNIFICANT CHANGE UP (ref 6–8.3)
PROT UR-MCNC: 30 MG/DL
RBC # BLD: 4 M/UL — SIGNIFICANT CHANGE UP (ref 3.8–5.2)
RBC # FLD: 14.6 % — HIGH (ref 10.3–14.5)
SODIUM SERPL-SCNC: 135 MMOL/L — SIGNIFICANT CHANGE UP (ref 135–145)
SODIUM UR-SCNC: 88 MMOL/L — SIGNIFICANT CHANGE UP
SP GR SPEC: 1.02 — SIGNIFICANT CHANGE UP (ref 1.01–1.02)
TOTAL CHOLESTEROL/HDL RATIO MEASUREMENT: 3.2 RATIO — LOW (ref 3.3–7.1)
TRIGL SERPL-MCNC: 106 MG/DL — SIGNIFICANT CHANGE UP (ref 10–149)
TSH SERPL-MCNC: 2.27 UU/ML — SIGNIFICANT CHANGE UP (ref 0.34–4.82)
UROBILINOGEN FLD QL: 1
VIT B12 SERPL-MCNC: 528 PG/ML — SIGNIFICANT CHANGE UP (ref 232–1245)
WBC # BLD: 8.11 K/UL — SIGNIFICANT CHANGE UP (ref 3.8–10.5)
WBC # FLD AUTO: 8.11 K/UL — SIGNIFICANT CHANGE UP (ref 3.8–10.5)

## 2020-02-09 PROCEDURE — 99233 SBSQ HOSP IP/OBS HIGH 50: CPT | Mod: GC

## 2020-02-09 RX ORDER — LANOLIN ALCOHOL/MO/W.PET/CERES
5 CREAM (GRAM) TOPICAL AT BEDTIME
Refills: 0 | Status: DISCONTINUED | OUTPATIENT
Start: 2020-02-09 | End: 2020-02-10

## 2020-02-09 RX ADMIN — Medication 2 MILLIGRAM(S): at 21:15

## 2020-02-09 RX ADMIN — Medication 1 MILLIGRAM(S): at 13:33

## 2020-02-09 RX ADMIN — Medication 50 MILLIGRAM(S): at 06:17

## 2020-02-09 RX ADMIN — Medication 105 MILLIGRAM(S): at 13:33

## 2020-02-09 RX ADMIN — Medication 30 MILLIGRAM(S): at 21:15

## 2020-02-09 RX ADMIN — Medication 50 MILLIGRAM(S): at 13:32

## 2020-02-09 RX ADMIN — ENOXAPARIN SODIUM 40 MILLIGRAM(S): 100 INJECTION SUBCUTANEOUS at 13:33

## 2020-02-09 RX ADMIN — CITALOPRAM 20 MILLIGRAM(S): 10 TABLET, FILM COATED ORAL at 13:33

## 2020-02-09 RX ADMIN — PANTOPRAZOLE SODIUM 40 MILLIGRAM(S): 20 TABLET, DELAYED RELEASE ORAL at 06:17

## 2020-02-09 RX ADMIN — Medication 2 MILLIGRAM(S): at 11:09

## 2020-02-09 RX ADMIN — Medication 5 MILLIGRAM(S): at 21:15

## 2020-02-09 RX ADMIN — Medication 2 MILLIGRAM(S): at 18:14

## 2020-02-09 NOTE — PATIENT PROFILE ADULT - NSPROEXTENSIONSOFSELF_GEN_A_NUR
----- Message from Yanna Mendes sent at 6/6/2019 11:14 AM EDT -----  Regarding: Dr. Wesley Paez  Patient is scheduled for an annual Medicare Visit on 11/13/19. Patient is due to be scheduled for annual visit after 10/15/19. She is requesting to keep the appointment in the month of October. Best contact number is [phone].
PSR called patient and notified of complete physical - medicare wellness appointment w/ Dr. Lindsey Culver. Patient was very thankful for the call and appointment.
Please notify she has been scheduled for 10/16/19 at 10:30 for her medicare wellness.
none

## 2020-02-09 NOTE — PROGRESS NOTE ADULT - SUBJECTIVE AND OBJECTIVE BOX
Patient is a 35y old  Female who presents with a chief complaint of Alcohol intoxication (10 Feb 2020 06:14)      INTERVAL HPI/OVERNIGHT EVENTS: feels much better today. no nausea/vomiting/ abdominal pain. Ciwa has decreased from 11-->7--->7  She is enrolled in an outpt substance use rehab.  Discussed plan to titrate down librium w/ patient to 30mg q 8 from 50mg q 8.     MEDICATIONS  (STANDING):  chlordiazePOXIDE 30 milliGRAM(s) Oral every 8 hours  citalopram 20 milliGRAM(s) Oral daily  enoxaparin Injectable 40 milliGRAM(s) SubCutaneous daily  folic acid 1 milliGRAM(s) Oral daily  pantoprazole    Tablet 40 milliGRAM(s) Oral before breakfast  sodium chloride 0.9%. 1000 milliLiter(s) (100 mL/Hr) IV Continuous <Continuous>  thiamine IVPB 500 milliGRAM(s) IV Intermittent daily    MEDICATIONS  (PRN):  LORazepam   Injectable 2 milliGRAM(s) IV Push every 2 hours PRN CIWA>7  melatonin 5 milliGRAM(s) Oral at bedtime PRN Insomnia      __________________________________________________  REVIEW OF SYSTEMS:    CONSTITUTIONAL: No fever,   EYES: no acute visual disturbances  NECK: No pain or stiffness  RESPIRATORY: No cough; No shortness of breath  CARDIOVASCULAR: No chest pain, no palpitations  GASTROINTESTINAL: No pain. No nausea or vomiting; No diarrhea   NEUROLOGICAL: No headache or numbness, no tremors  MUSCULOSKELETAL: No joint pain, no muscle pain  GENITOURINARY: no dysuria, no frequency, no hesitancy  PSYCHIATRY: no depression , no anxiety  ALL OTHER  ROS negative        Vital Signs Last 24 Hrs  T(C): 36.6 (10 Feb 2020 04:53), Max: 37.1 (2020 13:41)  T(F): 97.9 (10 Feb 2020 04:53), Max: 98.7 (2020 13:41)  HR: 70 (10 Feb 2020 04:53) (69 - 90)  BP: 133/85 (10 Feb 2020 04:53) (133/85 - 143/90)  BP(mean): --  RR: 17 (10 Feb 2020 04:53) (16 - 18)  SpO2: 98% (10 Feb 2020 04:53) (97% - 100%)    ________________________________________________  PHYSICAL EXAM:  GENERAL: NAD  HEENT: Normocephalic;  conjunctivae and sclerae clear; moist mucous membranes;   NECK : supple  CHEST/LUNG: Clear to auscultation bilaterally with good air entry   HEART: S1 S2  regular; no murmurs, gallops or rubs  ABDOMEN: Soft, Nontender, Nondistended; Bowel sounds present  EXTREMITIES: no cyanosis; no edema; no calf tenderness  SKIN: warm and dry; no rash  NERVOUS SYSTEM:  Awake and alert; Oriented  to place, person and time ; no new deficits          Urinalysis Basic - ( 2020 09:23 )    Color: Yellow / Appearance: Clear / S.020 / pH: x  Gluc: x / Ketone: Moderate  / Bili: Negative / Urobili: 1   Blood: x / Protein: 30 mg/dL / Nitrite: Negative   Leuk Esterase: Negative / RBC: 0-2 /HPF / WBC 0-2 /HPF   Sq Epi: x / Non Sq Epi: Few /HPF / Bacteria: Trace /HPF      CAPILLARY BLOOD GLUCOSE            RADIOLOGY & ADDITIONAL TESTS:    Imaging Personally Reviewed:  YES/NO    Consultant(s) Notes Reviewed:   YES/ No    Care Discussed with Consultants :     Plan of care was discussed with patient and /or primary care giver; all questions and concerns were addressed and care was aligned with patient's wishes.

## 2020-02-09 NOTE — PROGRESS NOTE ADULT - ASSESSMENT
Pt is a 34 y/o F with PMH of chronic alcohol abuse ( multiple admissions for alcohol intoxication) , alcohol withdrawal seizure, Depression who presented with alcohol intoxication.

## 2020-02-09 NOTE — PROGRESS NOTE ADULT - PROBLEM SELECTOR PLAN 1
symptomatically improved  decrease librium to 30mg q 8 hours today  Continue ativan prn 2mg IV q2 as per JALEELWA   social work consult  Continue Thiamin, folic acid and IV fluid  Seizure precaution

## 2020-02-10 VITALS
RESPIRATION RATE: 18 BRPM | HEART RATE: 100 BPM | OXYGEN SATURATION: 100 % | DIASTOLIC BLOOD PRESSURE: 93 MMHG | SYSTOLIC BLOOD PRESSURE: 137 MMHG | TEMPERATURE: 98 F

## 2020-02-10 LAB
ALBUMIN SERPL ELPH-MCNC: 3.3 G/DL — LOW (ref 3.5–5)
ALP SERPL-CCNC: 48 U/L — SIGNIFICANT CHANGE UP (ref 40–120)
ALT FLD-CCNC: 148 U/L DA — HIGH (ref 10–60)
ANION GAP SERPL CALC-SCNC: 6 MMOL/L — SIGNIFICANT CHANGE UP (ref 5–17)
AST SERPL-CCNC: 171 U/L — HIGH (ref 10–40)
BILIRUB SERPL-MCNC: 1 MG/DL — SIGNIFICANT CHANGE UP (ref 0.2–1.2)
BUN SERPL-MCNC: 8 MG/DL — SIGNIFICANT CHANGE UP (ref 7–18)
CALCIUM SERPL-MCNC: 9.1 MG/DL — SIGNIFICANT CHANGE UP (ref 8.4–10.5)
CHLORIDE SERPL-SCNC: 100 MMOL/L — SIGNIFICANT CHANGE UP (ref 96–108)
CO2 SERPL-SCNC: 26 MMOL/L — SIGNIFICANT CHANGE UP (ref 22–31)
CREAT SERPL-MCNC: 0.44 MG/DL — LOW (ref 0.5–1.3)
GLUCOSE SERPL-MCNC: 106 MG/DL — HIGH (ref 70–99)
HCT VFR BLD CALC: 38.9 % — SIGNIFICANT CHANGE UP (ref 34.5–45)
HGB BLD-MCNC: 13 G/DL — SIGNIFICANT CHANGE UP (ref 11.5–15.5)
MCHC RBC-ENTMCNC: 31.9 PG — SIGNIFICANT CHANGE UP (ref 27–34)
MCHC RBC-ENTMCNC: 33.4 GM/DL — SIGNIFICANT CHANGE UP (ref 32–36)
MCV RBC AUTO: 95.6 FL — SIGNIFICANT CHANGE UP (ref 80–100)
NRBC # BLD: 0 /100 WBCS — SIGNIFICANT CHANGE UP (ref 0–0)
PLATELET # BLD AUTO: 83 K/UL — LOW (ref 150–400)
POTASSIUM SERPL-MCNC: 3.1 MMOL/L — LOW (ref 3.5–5.3)
POTASSIUM SERPL-SCNC: 3.1 MMOL/L — LOW (ref 3.5–5.3)
PROT SERPL-MCNC: 6.8 G/DL — SIGNIFICANT CHANGE UP (ref 6–8.3)
RBC # BLD: 4.07 M/UL — SIGNIFICANT CHANGE UP (ref 3.8–5.2)
RBC # FLD: 14.5 % — SIGNIFICANT CHANGE UP (ref 10.3–14.5)
SODIUM SERPL-SCNC: 132 MMOL/L — LOW (ref 135–145)
WBC # BLD: 7.63 K/UL — SIGNIFICANT CHANGE UP (ref 3.8–10.5)
WBC # FLD AUTO: 7.63 K/UL — SIGNIFICANT CHANGE UP (ref 3.8–10.5)

## 2020-02-10 PROCEDURE — 84300 ASSAY OF URINE SODIUM: CPT

## 2020-02-10 PROCEDURE — 85027 COMPLETE CBC AUTOMATED: CPT

## 2020-02-10 PROCEDURE — 93005 ELECTROCARDIOGRAM TRACING: CPT

## 2020-02-10 PROCEDURE — 99285 EMERGENCY DEPT VISIT HI MDM: CPT | Mod: 25

## 2020-02-10 PROCEDURE — 80053 COMPREHEN METABOLIC PANEL: CPT

## 2020-02-10 PROCEDURE — 84443 ASSAY THYROID STIM HORMONE: CPT

## 2020-02-10 PROCEDURE — 83930 ASSAY OF BLOOD OSMOLALITY: CPT

## 2020-02-10 PROCEDURE — 83036 HEMOGLOBIN GLYCOSYLATED A1C: CPT

## 2020-02-10 PROCEDURE — 82306 VITAMIN D 25 HYDROXY: CPT

## 2020-02-10 PROCEDURE — 76700 US EXAM ABDOM COMPLETE: CPT | Mod: 26

## 2020-02-10 PROCEDURE — 83735 ASSAY OF MAGNESIUM: CPT

## 2020-02-10 PROCEDURE — 80074 ACUTE HEPATITIS PANEL: CPT

## 2020-02-10 PROCEDURE — 99239 HOSP IP/OBS DSCHRG MGMT >30: CPT | Mod: GC

## 2020-02-10 PROCEDURE — 80048 BASIC METABOLIC PNL TOTAL CA: CPT

## 2020-02-10 PROCEDURE — 84100 ASSAY OF PHOSPHORUS: CPT

## 2020-02-10 PROCEDURE — 76700 US EXAM ABDOM COMPLETE: CPT

## 2020-02-10 PROCEDURE — 82570 ASSAY OF URINE CREATININE: CPT

## 2020-02-10 PROCEDURE — 80307 DRUG TEST PRSMV CHEM ANLYZR: CPT

## 2020-02-10 PROCEDURE — 84702 CHORIONIC GONADOTROPIN TEST: CPT

## 2020-02-10 PROCEDURE — 80076 HEPATIC FUNCTION PANEL: CPT

## 2020-02-10 PROCEDURE — 83935 ASSAY OF URINE OSMOLALITY: CPT

## 2020-02-10 PROCEDURE — 82607 VITAMIN B-12: CPT

## 2020-02-10 PROCEDURE — 80061 LIPID PANEL: CPT

## 2020-02-10 PROCEDURE — 81001 URINALYSIS AUTO W/SCOPE: CPT

## 2020-02-10 PROCEDURE — 96372 THER/PROPH/DIAG INJ SC/IM: CPT

## 2020-02-10 PROCEDURE — 84156 ASSAY OF PROTEIN URINE: CPT

## 2020-02-10 PROCEDURE — 36415 COLL VENOUS BLD VENIPUNCTURE: CPT

## 2020-02-10 RX ORDER — ERGOCALCIFEROL 1.25 MG/1
50000 CAPSULE ORAL
Refills: 0 | Status: DISCONTINUED | OUTPATIENT
Start: 2020-02-10 | End: 2020-02-10

## 2020-02-10 RX ORDER — POTASSIUM CHLORIDE 20 MEQ
40 PACKET (EA) ORAL EVERY 4 HOURS
Refills: 0 | Status: DISCONTINUED | OUTPATIENT
Start: 2020-02-10 | End: 2020-02-10

## 2020-02-10 RX ORDER — SODIUM CHLORIDE 9 MG/ML
500 INJECTION INTRAMUSCULAR; INTRAVENOUS; SUBCUTANEOUS ONCE
Refills: 0 | Status: COMPLETED | OUTPATIENT
Start: 2020-02-10 | End: 2020-02-10

## 2020-02-10 RX ADMIN — ERGOCALCIFEROL 50000 UNIT(S): 1.25 CAPSULE ORAL at 12:10

## 2020-02-10 RX ADMIN — Medication 1 TABLET(S): at 12:10

## 2020-02-10 RX ADMIN — ENOXAPARIN SODIUM 40 MILLIGRAM(S): 100 INJECTION SUBCUTANEOUS at 12:10

## 2020-02-10 RX ADMIN — Medication 40 MILLIEQUIVALENT(S): at 15:05

## 2020-02-10 RX ADMIN — PANTOPRAZOLE SODIUM 40 MILLIGRAM(S): 20 TABLET, DELAYED RELEASE ORAL at 05:06

## 2020-02-10 RX ADMIN — Medication 40 MILLIEQUIVALENT(S): at 10:18

## 2020-02-10 RX ADMIN — Medication 30 MILLIGRAM(S): at 05:06

## 2020-02-10 RX ADMIN — Medication 1 MILLIGRAM(S): at 12:10

## 2020-02-10 RX ADMIN — CITALOPRAM 20 MILLIGRAM(S): 10 TABLET, FILM COATED ORAL at 12:10

## 2020-02-10 RX ADMIN — Medication 25 MILLIGRAM(S): at 15:05

## 2020-02-10 NOTE — PROGRESS NOTE ADULT - PROBLEM SELECTOR PLAN 1
Monitor for alcohol withdrawal  Continue ativan prn 2mg IV q2 as per CIWA   Continue Librium 50 q8  Continue Thiamin, folic acid and IV fluid  Seizure precaution

## 2020-02-10 NOTE — DISCHARGE NOTE PROVIDER - NSDCFUADDAPPT_GEN_ALL_CORE_FT
Patient wishes to leave against medical advice. Careful explanation is given to the patient/family members about the dangers of leaving. This dangers include and are not limited to  respiratory decompensation, chest pain leading to cardiac arrest and even death. Even after discussing this patient decided to leave AMA.

## 2020-02-10 NOTE — DISCHARGE NOTE NURSING/CASE MANAGEMENT/SOCIAL WORK - PATIENT PORTAL LINK FT
You can access the FollowMyHealth Patient Portal offered by [facility] by registering at the following website: [email or web address]. By joining [facility]’s FollowMyHealth portal, you will also be able to view your health information using other applications (apps) compatible with our system.

## 2020-02-10 NOTE — PROGRESS NOTE ADULT - ATTENDING COMMENTS
Patient was interviewed and examined at the bedside and discussed with Dr. Gomez.     She is feeling well--no anxiety, at present.  She is insistent that she wants to leave the hospital today.     Alert, cooperative woman, oriented x 3  Vital Signs Last 24 Hrs  T(C): 36.7 (10 Feb 2020 16:54), Max: 36.9 (09 Feb 2020 21:44)  T(F): 98.1 (10 Feb 2020 16:54), Max: 98.4 (09 Feb 2020 21:44)  HR: 100 (10 Feb 2020 16:54) (69 - 100)  BP: 137/93 (10 Feb 2020 16:54) (133/85 - 142/95)  BP(mean): 108 (10 Feb 2020 16:54) (102 - 108)  RR: 18 (10 Feb 2020 16:54) (15 - 18)  SpO2: 100% (10 Feb 2020 16:54) (97% - 100%)  No tremor, no confusion, no focal deficits.                           13.0   7.63  )-----------( 83       ( 10 Feb 2020 06:26 )             38.9   02-10    132<L>  |  100  |  8   ----------------------------<  106<H>  3.1<L>   |  26  |  0.44<L>    Ca    9.1      10 Feb 2020 06:26  Phos  2.9     02-09  Mg     2.2     02-09    TPro  6.8  /  Alb  3.3<L>  /  TBili  1.0  /  DBili  x   /  AST  171<H>  /  ALT  148<H>  /  AlkPhos  48  02-10    CIWA = 0    IMP:  Patient has been admitted for alcohol withdrawal.  She is doing well on benzodiazepine taper.  Our plan was to hold standing ativan and give only PRN, with discharge tomorrow, if patient had no signs or symptoms of alcohol withdrawal.   She has had several previous admissions for same, and has been counseled extensively.    is present, and also advises her to stay in the hospital, but is willing to take her home.   Plan:  Discharge home against medical advice.  Explained to patient and to her  that we are concerned about relapse or anxiety and tremors.  I have advised her not to drive until she is re-evaluated by her PMD, Dr. Clayton Shepard.

## 2020-02-10 NOTE — PROGRESS NOTE ADULT - SUBJECTIVE AND OBJECTIVE BOX
Patient is a 35y old  Female who presents with a chief complaint of Alcohol intoxication (08 Feb 2020 12:42)      INTERVAL HPI/OVERNIGHT EVENTS:      REVIEW OF SYSTEMS:  CONSTITUTIONAL: No fever, weight loss, or fatigue  EYES: No eye pain, visual disturbances, or discharge  ENMT:  No difficulty hearing, tinnitus, vertigo; No sinus or throat pain  NECK: No pain or stiffness  BREASTS: No pain, masses, or nipple discharge  RESPIRATORY: No cough, wheezing, chills or hemoptysis; No shortness of breath  CARDIOVASCULAR: No chest pain, palpitations, dizziness, or leg swelling  GASTROINTESTINAL: No abdominal or epigastric pain. No nausea, vomiting, or hematemesis; No diarrhea or constipation. No melena or hematochezia.  GENITOURINARY: No dysuria, frequency, hematuria, or incontinence  NEUROLOGICAL: No headaches, memory loss, loss of strength, numbness, or tremors  SKIN: No itching, burning, rashes, or lesions   LYMPH NODES: No enlarged glands  ENDOCRINE: No heat or cold intolerance; No hair loss  MUSCULOSKELETAL: No joint pain or swelling; No muscle, back, or extremity pain  HEME/LYMPH: No easy bruising, or bleeding gums  ALLERY AND IMMUNOLOGIC: No hives or eczema    FAMILY HISTORY:  No pertinent family history in first degree relatives    Vital Signs Last 24 Hrs  T(C): 36.6 (10 Feb 2020 04:53), Max: 37.1 (09 Feb 2020 13:41)  T(F): 97.9 (10 Feb 2020 04:53), Max: 98.7 (09 Feb 2020 13:41)  HR: 70 (10 Feb 2020 04:53) (69 - 90)  BP: 133/85 (10 Feb 2020 04:53) (133/85 - 143/90)  BP(mean): --  RR: 17 (10 Feb 2020 04:53) (16 - 18)  SpO2: 98% (10 Feb 2020 04:53) (97% - 100%)      PHYSICAL EXAM:  GENERAL: NAD, well-groomed, well-developed  HEAD:  Atraumatic, Normocephalic  EYES: EOMI, PERRLA, conjunctiva and sclera clear  ENMT: No tonsillar erythema, exudates, or enlargement; Moist mucous membranes, Good dentition, No lesions  NECK: Supple, No JVD, Normal thyroid  NERVOUS SYSTEM:  Alert & Oriented X3, Good concentration; Motor Strength 5/5 B/L upper and lower extremities; DTRs 2+ intact and symmetric  CHEST/LUNG: Clear to percussion bilaterally; No rales, rhonchi, wheezing, or rubs  HEART: Regular rate and rhythm; No murmurs, rubs, or gallops  ABDOMEN: Soft, Nontender, Nondistended; Bowel sounds present  EXTREMITIES:  2+ Peripheral Pulses, No clubbing, cyanosis, or edema  LYMPH: No lymphadenopathy noted  SKIN: No rashes or lesions    Consultant(s) Notes Reviewed:  [x ] YES  [ ] NO  Care Discussed with Consultants/Other Providers [ x] YES  [ ] NO    LABS:        RADIOLOGY & ADDITIONAL TESTS:    Imaging Personally Reviewed:  [ ] YES  [ ] NO  chlordiazePOXIDE 30 milliGRAM(s) Oral every 8 hours  citalopram 20 milliGRAM(s) Oral daily  enoxaparin Injectable 40 milliGRAM(s) SubCutaneous daily  folic acid 1 milliGRAM(s) Oral daily  LORazepam   Injectable 2 milliGRAM(s) IV Push every 2 hours PRN  melatonin 5 milliGRAM(s) Oral at bedtime PRN  pantoprazole    Tablet 40 milliGRAM(s) Oral before breakfast  sodium chloride 0.9%. 1000 milliLiter(s) IV Continuous <Continuous>  thiamine IVPB 500 milliGRAM(s) IV Intermittent daily      HEALTH ISSUES - PROBLEM Dx:  Alcohol withdrawal: Alcohol withdrawal  Hypoglycemia: Hypoglycemia  Prophylactic measure: Prophylactic measure  Depression: Depression  Thrombocytopenia: Thrombocytopenia  Elevated liver enzymes: Elevated liver enzymes  Hyponatremia: Hyponatremia  Dehydration: Dehydration  Alcohol intoxication: Alcohol intoxication

## 2020-02-10 NOTE — DISCHARGE NOTE PROVIDER - NSDCCPCAREPLAN_GEN_ALL_CORE_FT
PRINCIPAL DISCHARGE DIAGNOSIS  Diagnosis: Alcohol withdrawal  Assessment and Plan of Treatment: You were admitted for alcohol withdrawal. Excessive alcohol use can be dangerous or even deadly. You were managed with appropriate medications during your stay to relieve your symptoms and prevent further complications.   You should follow up with your primary care physician to discuss your alcohol use and other health problems. They will be able to help you cut back on your drinking and to ensure your safety and health.   You wished to leave against medical advice. Careful explanation is given to the you and your family members about the dangers of leaving. This dangers include and are not limited to respiratory decompensation, chest pain leading to cardiac arrest and even death. Even after discussing thisyou decided to leave AMA.   You are not allowed to drive until you are seen by your primary care doctor and has been cleared for driving by PCP.        SECONDARY DISCHARGE DIAGNOSES  Diagnosis: Alcoholic intoxication without complication  Assessment and Plan of Treatment:

## 2020-02-10 NOTE — DISCHARGE NOTE PROVIDER - HOSPITAL COURSE
HPI:    Pt is a 34 y/o F with PMH of chronic alcohol abuse ( multiple admissions for alcohol intoxication) , alcohol withdrawal seizure, Depression who presented with alcohol intoxication. Pt stated that she was brought by her  to ED as she was drinking alcohol heavily.    She stated that she has been binge drinking alcohol for a long time. Her last drink was last night. She was drinking a bottle of tequila continuously  since 3-4 days. She has feeling of guilt for drinking alcohol and has been trying to cut down. She mentioned that she has been going to psychiatrist and alcohol cessation program to get help for cutting down alcohol. She frequently feels depressed, has difficulty sleeping  and has been taking medication for depression. Denies suicidal ideation.    Pt denies all review of systems including headache, chest pain, shortness of breath, nausea, vomiting, abdominal pain, fever, chills. (08 Feb 2020 12:42)        Alcohol withdrawal.  Plan: Monitor for alcohol withdrawal    Continue ativan prn 2mg IV q2 as per JALEELWA     Continue Librium 50 q8    Continue Thiamin, folic acid and IV fluid    Seizure precaution.          Problem/Plan - 2:    ·  Problem: Dehydration.  Plan: Pt was tachycardic at ED    Continue IV fluid replacement.          Problem/Plan - 3:    ·  Problem: Hyponatremia.  Plan: Sodium: 134    Likely hypovolemic    F/u serum osmolality, urine osmolality, urine sodium.          Problem/Plan - 4:    ·  Problem: Hypoglycemia.  Plan: Blood glucose: 65    Start thiamin before supplementing glucose    Monitor Blood glucose.          Problem/Plan - 5:    ·  Problem: Elevated liver enzymes.  Plan: AST:95    ALT:92    F/u US abdomen    F/u hepatitis panel.          Problem/Plan - 6:    Problem: Thrombocytopenia. Plan: Platelet level:  117    Likely secondary to chronic alcohol abuse    Monitor platelet level.         Problem/Plan - 7:    ·  Problem: Depression.  Plan: Pt denies suicidal ideation    Continue home dose of escitalopram. HPI:    Pt is a 34 y/o F with PMH of chronic alcohol abuse ( multiple admissions for alcohol intoxication) , alcohol withdrawal seizure, Depression who presented with alcohol intoxication. Pt stated that she was brought by her  to ED as she was drinking alcohol heavily.    She stated that she has been binge drinking alcohol for a long time. Her last drink was last night. She was drinking a bottle of tequila continuously  since 3-4 days. She has feeling of guilt for drinking alcohol and has been trying to cut down. She mentioned that she has been going to psychiatrist and alcohol cessation program to get help for cutting down alcohol. She frequently feels depressed, has difficulty sleeping  and has been taking medication for depression. Denies suicidal ideation.    Pt denies all review of systems including headache, chest pain, shortness of breath, nausea, vomiting, abdominal pain, fever, chills. (08 Feb 2020 12:42)    Patient admitted for Alcohol withdrawal and started on ativan prn 2mg IV q2 as per ANABELLA and Librium 50 mg q8. Thiamin, folic acid and IV fluid started. patient was hyponatremic upon admission [facility] due to hypovolemia versus SIADH. IVF therapy continued.         patient presented with hypoglycemia and thiamin started before supplementing glucose         Problem/Plan - 4:    ·  Problem: Hypoglycemia.  Plan: Blood glucose: 65    Start thiamin before     Monitor Blood glucose.          Problem/Plan - 5:    ·  Problem: Elevated liver enzymes.  Plan: AST:95    ALT:92    F/u US abdomen    F/u hepatitis panel.          Problem/Plan - 6:    Problem: Thrombocytopenia. Plan: Platelet level:  117    Likely secondary to chronic alcohol abuse    Monitor platelet level.         Problem/Plan - 7:    ·  Problem: Depression.  Plan: Pt denies suicidal ideation    Continue home dose of escitalopram. HPI:    Pt is a 36 y/o F with PMH of chronic alcohol abuse ( multiple admissions for alcohol intoxication) , alcohol withdrawal seizure, Depression who presented with alcohol intoxication. Pt stated that she was brought by her  to ED as she was drinking alcohol heavily.    She stated that she has been binge drinking alcohol for a long time. Her last drink was last night. She was drinking a bottle of tequila continuously  since 3-4 days. She has feeling of guilt for drinking alcohol and has been trying to cut down. She mentioned that she has been going to psychiatrist and alcohol cessation program to get help for cutting down alcohol. She frequently feels depressed, has difficulty sleeping  and has been taking medication for depression. Denies suicidal ideation.    Pt denies all review of systems including headache, chest pain, shortness of breath, nausea, vomiting, abdominal pain, fever, chills. (08 Feb 2020 12:42)    Patient admitted for Alcohol withdrawal and started on ativan prn 2mg IV q2 as per ANABELLA and Librium 50 mg q8. Thiamin, folic acid and IV fluid started. [facility] SI[facility]. IVF therapy continued.     Patient presented with hypoglycemia and thiamin started before supplementing glucose.     Patient has transaminitis          Problem/Plan - 5:    ·  Problem: Elevated liver enzymes.  Plan: AST:95    ALT:92    F/u US abdomen    F/u hepatitis panel.          Problem/Plan - 6:    Problem: Thrombocytopenia. Plan: Platelet level:  117    Likely secondary to chronic alcohol abuse    Monitor platelet level.         Problem/Plan - 7:    ·  Problem: Depression.  Plan: Pt denies suicidal ideation    Continue home dose of escitalopram. HPI:    Pt is a 36 y/o F with PMH of chronic alcohol abuse ( multiple admissions for alcohol intoxication) , alcohol withdrawal seizure, Depression who presented with alcohol intoxication. Pt stated that she was brought by her  to ED as she was drinking alcohol heavily.    She stated that she has been binge drinking alcohol for a long time. Her last drink was last night. She was drinking a bottle of tequila continuously  since 3-4 days. She has feeling of guilt for drinking alcohol and has been trying to cut down. She mentioned that she has been going to psychiatrist and alcohol cessation program to get help for cutting down alcohol. She frequently feels depressed, has difficulty sleeping  and has been taking medication for depression. Denies suicidal ideation.    Pt denies all review of systems including headache, chest pain, shortness of breath, nausea, vomiting, abdominal pain, fever, chills. (08 Feb 2020 12:42)    Patient admitted for Alcohol withdrawal and started on ativan prn 2mg IV q2 as per ANABELLA and Librium 50 mg q8. Thiamin, folic acid and IV fluid started. [facility] versus SIADH. IVF therapy continued.     Patient presented with hypoglycemia and thiamin started before supplementing glucose.     Patient has transaminitis and hepatic ultrasound was pending.         Patient wishes to leave against medical advice. Careful explanation is given to the patient/family members about the dangers of leaving. This dangers include and are not limited to respiratory decompensation, chest pain leading to cardiac arrest and even death. Even after discussing this patient decided to leave AMA.

## 2020-02-12 NOTE — PATIENT PROFILE ADULT - DO YOU FEEL UNSAFE AT SCHOOL?
[facility]  ED  Emergency Department Encounter  EmergencyMedicine Resident     Pt Mainor Alvarado  MRN: [id number]  [place] 1951  Date of evaluation: 2/12/20  PCP:  Chrissy Schaffer MD    CHIEF COMPLAINT       Chief Complaint   Patient presents with    Cerebrovascular Accident       HISTORY OF PRESENT ILLNESS  (Location/Symptom, Timing/Onset, Context/Setting, Quality, Duration, Modifying Factors, Severity.)      Pamela Messina is a 76 y.o. female who presents as transfer from outside hospital for subarachnoid hemorrhage. Patient has past medical history of hypertension and hyperlipidemia. According to EMS patient was at dentist around 10:30 AM and had the worst headache of her life, with some dysphasia and weakness and fatigue. CT head was completed at outside facility which showed subarachnoid hemorrhage and was transferred to [facility] for further evaluation. Patient was started on Keppra and labetalol prior to transfer. Patient was switched to Cardene, and taken to CT scanner for CTA of head, neurology/team was present upon patient's arrival plan for cerebral angiogram and possible coiling. PAST MEDICAL / SURGICAL / SOCIAL / FAMILY HISTORY      has a past medical history of Acid reflux, Arthritis, Cancer ([address] Family history of breast cancer in first degree relative, Gall stones, Hard of hearing, History of cervical cancer, Hypertension, Lateral meniscus tear, Wears dentures, and Wears glasses. has a past surgical history that includes Tonsillectomy and adenoidectomy (1956); Cholecystectomy; Inner ear surgery; Knee arthroscopy (2000, 2005); cyst removal (1980); Colonoscopy (12/09); Breast biopsy (1980); Foot surgery (Bilateral); other surgical history (Right, 9-4-14); Umbilical hernia repair; hernia repair; Varicose vein surgery (Bilateral); Knee arthroscopy (Left, 4/3/2019); and Colonoscopy (N/A, 5/31/2019).     Social History     Socioeconomic History    Marital lungs are clear to auscultation bilaterally, no acute respiratory stress  Abdomen: Soft, nondistended, no involuntary guarding  Neuromuscular exam:  Arouses to voice, alert and oriented, appears somnolent, patient has moderate dysarthria, GCS 14, follows simple commands, muscle strength: Upper extremities 4 /5, lower extremities 3/5, positive drift right upper extremity, no clonus noted, abnormal movements of head, intention tremor bilateral, with dysarthria, sensation to light touch present. CN:  II: Grossly intact  III: PERRLA, no ptosis  V: Intact  VII: Sided facial droop  VIII: Hearing, decreased  XI: Grossly intact  XII: Grossly intact    DIFFERENTIAL  DIAGNOSIS     PLAN (LABS / IMAGING / EKG):  Orders Placed This Encounter   Procedures    CTA HEAD W CONTRAST    CT HEAD WO CONTRAST    IR ANGIOGRAM CAROTID C [id number] Inpatient consult to Neurosurgery    PATIENT STATUS (FROM ED OR OR/PROCEDURAL) Inpatient       MEDICATIONS ORDERED:  Orders Placed This Encounter   Medications    ioversol (OPTIRAY) 74 % injection 90 mL    DISCONTD: niCARdipine (CARDENE) 25 mg in dextrose 5 % 250 mL infusion    niCARdipine (CARDENE) 25 mg in dextrose 5 % 250 mL infusion    atorvastatin (LIPITOR) tablet 40 mg    niMODipine (NIMOTOP) capsule 60 mg    morphine injection 4 mg         DIAGNOSTIC RESULTS / EMERGENCY DEPARTMENT COURSE / MDM   :  No results found for this visit on 02/12/20.       RADIOLOGY:  None    EKG  None    All EKG's are interpreted by the Emergency Department Physician who either signs or Co-signs this chart in the absence of a cardiologist.    EMERGENCY DEPARTMENT COURSE/IMPRESSION:  60-year-old female with subarachnoid hemorrhage at outside facility transferred to our facility, stroke team was notified, stroke team present upon patient's arrival, patient with dysarthria and mild aphasia, with right-sided facial droop, patient was sent to CT for CTA which showed 2 mm aneurysm, patient was switched from labetalol to Cardene, blood pressures have been maintained less than 140, patient complaining of headache was given morphine for pain control, neurosurgery was contacted they are following, at this time patient is admitted to neurology service with plans for neuro intervention. PROCEDURES:  None    CONSULTS:  IP CONSULT TO NEUROSURGERY  IP CONSULT TO PHARMACY    CRITICAL CARE:  None    FINAL IMPRESSION      1.  SAH (subarachnoid hemorrhage) ([facility] [address]          DISPOSITION / PLAN     DISPOSITION Admitted 02/12/2020 02:59:52 PM      PATIENT REFERRED TO:  Johanna Powell MD  [facility] [id number]  [address] [place] [address] Street, [place]  [address]  [address] [place] [address]  [phone]            DISCHARGE MEDICATIONS:  New Prescriptions    No medications on file       Chuy Amaya DO  Emergency Medicine Resident    (Please note that portions of thisnote were completed with a voice recognition program.  Efforts were made to edit the dictations but occasionally words are mis-transcribed.)     Chuy Amaya DO  Resident  02/12/20 Jamison Arechiga not applicable

## 2020-02-26 ENCOUNTER — INPATIENT (INPATIENT)
Facility: HOSPITAL | Age: 35
LOS: 1 days | Discharge: HOME | DRG: 894 | End: 2020-02-28
Attending: INTERNAL MEDICINE | Admitting: INTERNAL MEDICINE
Payer: MEDICAID

## 2020-02-26 VITALS
WEIGHT: 169.98 LBS | HEART RATE: 95 BPM | HEIGHT: 63 IN | DIASTOLIC BLOOD PRESSURE: 70 MMHG | SYSTOLIC BLOOD PRESSURE: 120 MMHG | RESPIRATION RATE: 22 BRPM | OXYGEN SATURATION: 98 %

## 2020-02-26 LAB
ALBUMIN SERPL ELPH-MCNC: 3.7 G/DL — SIGNIFICANT CHANGE UP (ref 3.5–5)
ALP SERPL-CCNC: 64 U/L — SIGNIFICANT CHANGE UP (ref 40–120)
ALT FLD-CCNC: 132 U/L DA — HIGH (ref 10–60)
ANION GAP SERPL CALC-SCNC: 11 MMOL/L — SIGNIFICANT CHANGE UP (ref 5–17)
APAP SERPL-MCNC: <2 UG/ML — LOW (ref 10–30)
AST SERPL-CCNC: 65 U/L — HIGH (ref 10–40)
BASOPHILS # BLD AUTO: 0.12 K/UL — SIGNIFICANT CHANGE UP (ref 0–0.2)
BASOPHILS NFR BLD AUTO: 1.5 % — SIGNIFICANT CHANGE UP (ref 0–2)
BILIRUB SERPL-MCNC: 0.2 MG/DL — SIGNIFICANT CHANGE UP (ref 0.2–1.2)
BUN SERPL-MCNC: 8 MG/DL — SIGNIFICANT CHANGE UP (ref 7–18)
CALCIUM SERPL-MCNC: 8.4 MG/DL — SIGNIFICANT CHANGE UP (ref 8.4–10.5)
CHLORIDE SERPL-SCNC: 104 MMOL/L — SIGNIFICANT CHANGE UP (ref 96–108)
CO2 SERPL-SCNC: 26 MMOL/L — SIGNIFICANT CHANGE UP (ref 22–31)
CREAT SERPL-MCNC: 0.49 MG/DL — LOW (ref 0.5–1.3)
EOSINOPHIL # BLD AUTO: 0.05 K/UL — SIGNIFICANT CHANGE UP (ref 0–0.5)
EOSINOPHIL NFR BLD AUTO: 0.6 % — SIGNIFICANT CHANGE UP (ref 0–6)
ETHANOL SERPL-MCNC: 425 MG/DL — HIGH (ref 0–10)
GLUCOSE SERPL-MCNC: 88 MG/DL — SIGNIFICANT CHANGE UP (ref 70–99)
HCG SERPL-ACNC: <1 MIU/ML — SIGNIFICANT CHANGE UP
HCT VFR BLD CALC: 43.3 % — SIGNIFICANT CHANGE UP (ref 34.5–45)
HGB BLD-MCNC: 14.6 G/DL — SIGNIFICANT CHANGE UP (ref 11.5–15.5)
IMM GRANULOCYTES NFR BLD AUTO: 0.6 % — SIGNIFICANT CHANGE UP (ref 0–1.5)
LYMPHOCYTES # BLD AUTO: 3.25 K/UL — SIGNIFICANT CHANGE UP (ref 1–3.3)
LYMPHOCYTES # BLD AUTO: 39.5 % — SIGNIFICANT CHANGE UP (ref 13–44)
MCHC RBC-ENTMCNC: 32.3 PG — SIGNIFICANT CHANGE UP (ref 27–34)
MCHC RBC-ENTMCNC: 33.7 GM/DL — SIGNIFICANT CHANGE UP (ref 32–36)
MCV RBC AUTO: 95.8 FL — SIGNIFICANT CHANGE UP (ref 80–100)
MONOCYTES # BLD AUTO: 0.45 K/UL — SIGNIFICANT CHANGE UP (ref 0–0.9)
MONOCYTES NFR BLD AUTO: 5.5 % — SIGNIFICANT CHANGE UP (ref 2–14)
NEUTROPHILS # BLD AUTO: 4.31 K/UL — SIGNIFICANT CHANGE UP (ref 1.8–7.4)
NEUTROPHILS NFR BLD AUTO: 52.3 % — SIGNIFICANT CHANGE UP (ref 43–77)
NRBC # BLD: 0 /100 WBCS — SIGNIFICANT CHANGE UP (ref 0–0)
PLATELET # BLD AUTO: 407 K/UL — HIGH (ref 150–400)
POTASSIUM SERPL-MCNC: 3.8 MMOL/L — SIGNIFICANT CHANGE UP (ref 3.5–5.3)
POTASSIUM SERPL-SCNC: 3.8 MMOL/L — SIGNIFICANT CHANGE UP (ref 3.5–5.3)
PROT SERPL-MCNC: 7.9 G/DL — SIGNIFICANT CHANGE UP (ref 6–8.3)
RBC # BLD: 4.52 M/UL — SIGNIFICANT CHANGE UP (ref 3.8–5.2)
RBC # FLD: 14.4 % — SIGNIFICANT CHANGE UP (ref 10.3–14.5)
SALICYLATES SERPL-MCNC: 4.3 MG/DL — SIGNIFICANT CHANGE UP (ref 2.8–20)
SODIUM SERPL-SCNC: 141 MMOL/L — SIGNIFICANT CHANGE UP (ref 135–145)
WBC # BLD: 8.23 K/UL — SIGNIFICANT CHANGE UP (ref 3.8–10.5)
WBC # FLD AUTO: 8.23 K/UL — SIGNIFICANT CHANGE UP (ref 3.8–10.5)

## 2020-02-26 PROCEDURE — 70450 CT HEAD/BRAIN W/O DYE: CPT | Mod: 26

## 2020-02-26 RX ORDER — HALOPERIDOL DECANOATE 100 MG/ML
5 INJECTION INTRAMUSCULAR ONCE
Refills: 0 | Status: COMPLETED | OUTPATIENT
Start: 2020-02-26 | End: 2020-02-26

## 2020-02-26 RX ORDER — THIAMINE MONONITRATE (VIT B1) 100 MG
500 TABLET ORAL ONCE
Refills: 0 | Status: COMPLETED | OUTPATIENT
Start: 2020-02-26 | End: 2020-02-26

## 2020-02-26 RX ORDER — SODIUM CHLORIDE 9 MG/ML
1000 INJECTION INTRAMUSCULAR; INTRAVENOUS; SUBCUTANEOUS ONCE
Refills: 0 | Status: COMPLETED | OUTPATIENT
Start: 2020-02-26 | End: 2020-02-26

## 2020-02-26 RX ADMIN — SODIUM CHLORIDE 1000 MILLILITER(S): 9 INJECTION INTRAMUSCULAR; INTRAVENOUS; SUBCUTANEOUS at 21:00

## 2020-02-26 RX ADMIN — Medication 105 MILLIGRAM(S): at 21:30

## 2020-02-26 RX ADMIN — Medication 2 MILLIGRAM(S): at 19:35

## 2020-02-26 RX ADMIN — HALOPERIDOL DECANOATE 5 MILLIGRAM(S): 100 INJECTION INTRAMUSCULAR at 19:35

## 2020-02-26 RX ADMIN — Medication 500 MILLIGRAM(S): at 22:30

## 2020-02-26 RX ADMIN — SODIUM CHLORIDE 1000 MILLILITER(S): 9 INJECTION INTRAMUSCULAR; INTRAVENOUS; SUBCUTANEOUS at 20:14

## 2020-02-26 NOTE — ED PROVIDER NOTE - CLINICAL SUMMARY MEDICAL DECISION MAKING FREE TEXT BOX
No external evidence of other trauma. No signs of elevated ICP. No vomiting or signs of dehydration. Pt of acute safety concern for herself and for staff and highly disruptive for floor, and given haldol/ativan for agitation. Given fluids, thiamine. No external evidence of other trauma. No signs of elevated ICP. No vomiting or signs of dehydration. Pt of acute safety concern for herself and for staff and highly disruptive for floor, and given haldol/ativan for agitation. Given fluids, thiamine. Signed off care to Dr. AMY James who will f/u CT head and reassess once sober.

## 2020-02-26 NOTE — ED PROVIDER NOTE - PHYSICAL EXAMINATION
Afebrile, hemodynamically stable, saturating well  Tearful, shrieking, trying to get off stretcher  Head NCAT  Pupils equal, EOMI grossly, anicteric  MMM  RRR, nml S1/S2, no m/r/g  Lungs CTAB, no w/r/r  Abd soft, NT, ND, nml BS, no rebound or guarding  AAO, CN's 3-12 grossly intact  LIPSCOMB spontaneously, no leg cyanosis or edema, no extremity stepoff/deformity  Skin warm, well perfused, no rashes or hives

## 2020-02-26 NOTE — ED PROVIDER NOTE - PROGRESS NOTE DETAILS
Signed off care to Dr. AMY James who will f/u CT head and reassess once sober. (Jacob) - on ressessment, pt clinically sober. Adamentaly denying SI, HI, or other acute complaints. States "I feel hung over." Wants to go home but does not have keys to her home. Spoke with  on phone who states "she's depressed and needs to talk with a psychiatrist" and "she needs to stay for a few a days so I can't pick her up." Will order meds to prevent withdrawal and sign out to day team to f/u reassessment. Wright: Patient signed out by Dr. James. Without SI/HI. Called , [facility]. At work currently. Can come  patient at 5PM. Will get SW consult. Wright: Patient signed out by Dr. James. Without SI/HI. Called , [facility] and reports with reported seizures and tremulous. Will admit for alcohol withdrawal. Rere Barraza was the scribe assigned to work with Dr. Mishra on 2/26/2020. I, Josee Stewart, am signing on behalf of Rere Barraza and attesting to the face that Rere Barraza worked the assigned shift with Dr. Mishra on 2/7/2020 providing scribe services.

## 2020-02-26 NOTE — ED ADULT NURSE NOTE - BREATH SOUNDS, MLM
Progress Note





- Progress Note


Date of Service: 01/28/20


Note: 





71F with known medical history of developmental delays, heart failure with 

preserved EF, DM type 2, cirrhosis, untreated hepatitis C, CKD, COPD, HTN, HLD, 

and PARVIZ, initially presents to [facility] [place] for SOB x 4 days. She was 

discharged from [facility] on 1/21 after being treated for dehydration 

and hypoglycemia, diagnosed with viral gastroenteritis. Upon admission to [facility], 

she was diagnosed with Flu A as well as bacterial PNA. Her urine was positive 

for ESBL. She was started on levaquin on 1/26 and macrobid on 1/27 @ 2100. She 

started experiencing left cheek/facial swelling. She was given solumedrol and 

benadryl without much improvement. She continues to speak in whispers, saying 

she cannot talk. She cannot express whether or not she feels like her face or 

throat is swollen. Shes having difficulty swallowing her secretions. She is 

unable to answer orientation questions but she does follow commands. No 

stridor. Wheezing throughout all lung fields. Duonebs scheduled started. Chest 

xray now. Continue to hold antibiotics. Continue tamiflu. Continue to monitor 

respiratory status and airway. Clear

## 2020-02-26 NOTE — ED ADULT NURSE NOTE - NSIMPLEMENTINTERV_GEN_ALL_ED
Implemented All Fall Risk Interventions:  [place] to call system. Call bell, personal items and telephone within reach. Instruct patient to call for assistance. Room bathroom lighting operational. Non-slip footwear when patient is off stretcher. Physically safe environment: no spills, clutter or unnecessary equipment. Stretcher in lowest position, wheels locked, appropriate side rails in place. Provide visual cue, wrist band, yellow gown, etc. Monitor gait and stability. Monitor for mental status changes and reorient to person, place, and time. Review medications for side effects contributing to fall risk. Reinforce activity limits and safety measures with patient and family.

## 2020-02-26 NOTE — ED PROVIDER NOTE - OBJECTIVE STATEMENT
35yoF with h/o alcohol abuse and depression (on escitalopram) p/w alcohol intoxication. Per  she has been drinking nonstop for 4 days, and also states she had 2 seizures today which she gets whenever she drinks too much, and after the last seizure she hit her head. He also states she needs to be admitted for depression as he is concerned she will throw herself down the stairs and that she will go through withdrawal as well. Pt herself is tearful, combative and confirms EtOH and denies drugs and all other symptoms including HA, CP, SOB, abd pain, vomiting, other injuries.

## 2020-02-26 NOTE — ED PROVIDER NOTE - CARE PLAN
Principal Discharge DX:	Alcoholic intoxication with complication  Secondary Diagnosis:	Seizures  Secondary Diagnosis:	Fall, initial encounter Principal Discharge DX:	Alcohol withdrawal  Secondary Diagnosis:	Seizures  Secondary Diagnosis:	Fall, initial encounter

## 2020-02-26 NOTE — ED ADULT NURSE NOTE - OBJECTIVE STATEMENT
The patient presents s/p witnessed two seizure episode at home by the .  He states that she has been drinking for four days.  The patient is noted with agitation and unable to cooperate, she is a poor historian.  She is at risk to self harm.  The patient received  Ativan 2, Benadryl 50mg, and Haldol 5mg IM.

## 2020-02-27 DIAGNOSIS — F10.239 ALCOHOL DEPENDENCE WITH WITHDRAWAL, UNSPECIFIED: ICD-10-CM

## 2020-02-27 DIAGNOSIS — Z29.9 ENCOUNTER FOR PROPHYLACTIC MEASURES, UNSPECIFIED: ICD-10-CM

## 2020-02-27 DIAGNOSIS — F32.9 MAJOR DEPRESSIVE DISORDER, SINGLE EPISODE, UNSPECIFIED: ICD-10-CM

## 2020-02-27 LAB
ALBUMIN SERPL ELPH-MCNC: 3.3 G/DL — LOW (ref 3.5–5)
ALP SERPL-CCNC: 57 U/L — SIGNIFICANT CHANGE UP (ref 40–120)
ALT FLD-CCNC: 107 U/L DA — HIGH (ref 10–60)
ANION GAP SERPL CALC-SCNC: 7 MMOL/L — SIGNIFICANT CHANGE UP (ref 5–17)
AST SERPL-CCNC: 58 U/L — HIGH (ref 10–40)
BILIRUB DIRECT SERPL-MCNC: 0.2 MG/DL — SIGNIFICANT CHANGE UP (ref 0–0.2)
BILIRUB INDIRECT FLD-MCNC: 0.2 MG/DL — SIGNIFICANT CHANGE UP (ref 0.2–1)
BILIRUB SERPL-MCNC: 0.4 MG/DL — SIGNIFICANT CHANGE UP (ref 0.2–1.2)
BUN SERPL-MCNC: 11 MG/DL — SIGNIFICANT CHANGE UP (ref 7–18)
CALCIUM SERPL-MCNC: 9 MG/DL — SIGNIFICANT CHANGE UP (ref 8.4–10.5)
CHLORIDE SERPL-SCNC: 102 MMOL/L — SIGNIFICANT CHANGE UP (ref 96–108)
CO2 SERPL-SCNC: 28 MMOL/L — SIGNIFICANT CHANGE UP (ref 22–31)
CREAT SERPL-MCNC: 0.58 MG/DL — SIGNIFICANT CHANGE UP (ref 0.5–1.3)
GLUCOSE SERPL-MCNC: 146 MG/DL — HIGH (ref 70–99)
MAGNESIUM SERPL-MCNC: 1.9 MG/DL — SIGNIFICANT CHANGE UP (ref 1.6–2.6)
PCP SPEC-MCNC: SIGNIFICANT CHANGE UP
PHOSPHATE SERPL-MCNC: 2.8 MG/DL — SIGNIFICANT CHANGE UP (ref 2.5–4.5)
POTASSIUM SERPL-MCNC: 4.1 MMOL/L — SIGNIFICANT CHANGE UP (ref 3.5–5.3)
POTASSIUM SERPL-SCNC: 4.1 MMOL/L — SIGNIFICANT CHANGE UP (ref 3.5–5.3)
PROT SERPL-MCNC: 7.1 G/DL — SIGNIFICANT CHANGE UP (ref 6–8.3)
SODIUM SERPL-SCNC: 137 MMOL/L — SIGNIFICANT CHANGE UP (ref 135–145)

## 2020-02-27 PROCEDURE — 99285 EMERGENCY DEPT VISIT HI MDM: CPT

## 2020-02-27 PROCEDURE — 99232 SBSQ HOSP IP/OBS MODERATE 35: CPT | Mod: GC

## 2020-02-27 PROCEDURE — 99223 1ST HOSP IP/OBS HIGH 75: CPT

## 2020-02-27 RX ORDER — SODIUM CHLORIDE 9 MG/ML
1000 INJECTION, SOLUTION INTRAVENOUS
Refills: 0 | Status: DISCONTINUED | OUTPATIENT
Start: 2020-02-27 | End: 2020-02-28

## 2020-02-27 RX ORDER — THIAMINE MONONITRATE (VIT B1) 100 MG
500 TABLET ORAL DAILY
Refills: 0 | Status: DISCONTINUED | OUTPATIENT
Start: 2020-02-27 | End: 2020-02-28

## 2020-02-27 RX ORDER — ENOXAPARIN SODIUM 100 MG/ML
40 INJECTION SUBCUTANEOUS DAILY
Refills: 0 | Status: DISCONTINUED | OUTPATIENT
Start: 2020-02-27 | End: 2020-02-28

## 2020-02-27 RX ORDER — ESCITALOPRAM OXALATE 10 MG/1
5 TABLET, FILM COATED ORAL DAILY
Refills: 0 | Status: DISCONTINUED | OUTPATIENT
Start: 2020-02-27 | End: 2020-02-28

## 2020-02-27 RX ORDER — FOLIC ACID 0.8 MG
1 TABLET ORAL DAILY
Refills: 0 | Status: DISCONTINUED | OUTPATIENT
Start: 2020-02-27 | End: 2020-02-28

## 2020-02-27 RX ORDER — PANTOPRAZOLE SODIUM 20 MG/1
40 TABLET, DELAYED RELEASE ORAL
Refills: 0 | Status: DISCONTINUED | OUTPATIENT
Start: 2020-02-27 | End: 2020-02-28

## 2020-02-27 RX ADMIN — SODIUM CHLORIDE 100 MILLILITER(S): 9 INJECTION, SOLUTION INTRAVENOUS at 22:29

## 2020-02-27 RX ADMIN — Medication 25 MILLIGRAM(S): at 22:28

## 2020-02-27 RX ADMIN — Medication 25 MILLIGRAM(S): at 13:59

## 2020-02-27 RX ADMIN — SODIUM CHLORIDE 100 MILLILITER(S): 9 INJECTION, SOLUTION INTRAVENOUS at 17:20

## 2020-02-27 RX ADMIN — Medication 50 MILLIGRAM(S): at 06:08

## 2020-02-27 RX ADMIN — Medication 2 MILLIGRAM(S): at 07:35

## 2020-02-27 NOTE — BEHAVIORAL HEALTH ASSESSMENT NOTE - NSBHSOCIALHXDETAILSFT_PSY_A_CORE
B/R in [place], came to [place] with family @age 12. Formerly  but  in 2014. Now remarried, lives with  and 15 yo son in [place]. 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

## 2020-02-27 NOTE — BEHAVIORAL HEALTH ASSESSMENT NOTE - NSBHCHARTREVIEWVS_PSY_A_CORE FT
Vital Signs Last 24 Hrs  T(C): 36.5 (27 Feb 2020 13:51), Max: 37.3 (27 Feb 2020 07:37)  T(F): 97.7 (27 Feb 2020 13:51), Max: 99.2 (27 Feb 2020 07:37)  HR: 109 (27 Feb 2020 13:51) (92 - 109)  BP: 156/93 (27 Feb 2020 13:51) (120/70 - 157/80)  BP(mean): --  RR: 16 (27 Feb 2020 13:51) (16 - 22)  SpO2: 97% (27 Feb 2020 13:51) (95% - 98%)

## 2020-02-27 NOTE — H&P ADULT - NSHPLABSRESULTS_GEN_ALL_CORE
14.6   8.23  )-----------( 407      ( 26 Feb 2020 20:12 )             43.3       02-27    137  |  102  |  11  ----------------------------<  146<H>  4.1   |  28  |  0.58    Ca    9.0      27 Feb 2020 12:32  Phos  2.8     02-27  Mg     1.9     02-27    TPro  7.1  /  Alb  3.3<L>  /  TBili  0.4  /  DBili  0.2  /  AST  58<H>  /  ALT  107<H>  /  AlkPhos  57  02-27

## 2020-02-27 NOTE — BEHAVIORAL HEALTH ASSESSMENT NOTE - NSBHCHARTREVIEWIMAGING_PSY_A_CORE FT
< from: CT Head No Cont (02.26.20 @ 23:03) >    IMPRESSION:     No evidence of acute intracranial hemorrhage, midline shift or CT evidence of acute territorial infarct.    If the patient's symptoms persist, consider short interval follow-up head CT or brain MRI if there are no MRI contraindications.    < end of copied text >

## 2020-02-27 NOTE — H&P ADULT - HISTORY OF PRESENT ILLNESS
Pt is a 34 y/o F from home, with PMH of chronic alcohol abuse ( multiple admissions for alcohol intoxication) , alcohol withdrawal seizure, Depression who was esented with alcohol intoxication. Pt stated that she was brought by her  to ED as she was drinking alcohol heavily.  She stated that she has been binge drinking alcohol for a long time. Her last drink was last night. She was drinking a bottle of tequila continuously  since 3-4 days. She has feeling of guilt for drinking alcohol and has been trying to cut down. She mentioned that she has been going to psychiatrist and alcohol cessation program to get help for cutting down alcohol. She frequently feels depressed, has difficulty sleeping  and has been taking medication for depression. Denies suicidal ideation.  Pt denies all review of systems including headache, chest pain, shortness of breath, nausea, vomiting, abdominal pain, fever, chills. Pt is a 36 y/o F from home, with PMH of chronic alcohol abuse ( multiple admissions for alcohol intoxication) , alcohol withdrawal seizure, Depression who was brought by her  for alcohol intoxication. Pt was AOx3 at bedside.  She  stated that she was brought by her  to ED as she was drinking alcohol continuously for 2 days and her  wanted her to be safe. Pt is a 36 y/o F from home, with PMH of chronic alcohol abuse ( multiple admissions for alcohol intoxication) , alcohol withdrawal seizure, Depression who was brought by her  for alcohol intoxication. Pt was AOx3 at bedside.  She  stated that she was brought by her  to ED as she was drinking alcohol continuously for 2 days and her  wanted her to be safe. As per ED provider's note pt;s  complained of 2 episodes of seizures at home. But, pt denied any seizure episodes. She mentioned that she has been on and off binge drinking for a long time.  last time she drank alcohol was yesterday. She was drinking a bottle of tequila a day.  She stated that she has guilt of drinking alcohol and wants to quit. She feels irritated when people tells her to quit drinking. She mentioned that she has depression and takes medication for depression. She feels stressed at home as her  keeps calling his son who takes drugs.   She denied chest pain, shortness of breath, nausea, vomiting, abdominal pain and other complains.

## 2020-02-27 NOTE — BEHAVIORAL HEALTH ASSESSMENT NOTE - NSBHCHARTREVIEWINVESTIGATE_PSY_A_CORE FT
< from: 12 Lead ECG (02.09.20 @ 00:26) >    QTC Calculation(Bezet) 439 ms    P Axis 45 degrees    R Axis 71 degrees    T Axis 67 degrees    Diagnosis Line Normal sinus rhythm  Normal ECG    < end of copied text >

## 2020-02-27 NOTE — H&P ADULT - PROBLEM SELECTOR PLAN 1
Monitor for alcohol withdrawal  Continue Ativan PRN as per CIWA  Continue Librium 50 mg Q 8  Continue  Thiamin, multivitamin , folic acid  Continue IV fluid

## 2020-02-27 NOTE — BEHAVIORAL HEALTH ASSESSMENT NOTE - SUMMARY
36 yo Polish-American F,  and living with  and 15 yo son and working as [place]  for [employer], with PHx of depression and recurrent severe alcohol use DO with multiple recent admissions for alcohol withdrawal at this hospital, known to writer from previous consult on 11/19/19, BIB  on 2/26 for alcohol intoxication and 2 reported seizures at home (denied by pt), and admitted for treatment of withdrawal. Psych consult was requested to r/o SI, which pt denied on arrival, due to 's report to primary team that he "fears she may get suicidal and throw herself down the stairs" if DC'd home prematurely. On exam, pt endorses h/o depression and alcohol use DO, but strongly denies SI and states that she has "never" had any thoughts of self-harm. Pt remains precontemplative about IP rehab, despite recent h/o repeated admissions to this hospital for alcohol withdrawal. Pt describes home Lexapro as helpful and states that she wishes to continue it while admitted. Pt does not appear to present an acute risk of harm to self or others at the time of assessment, and does not appear to be in need of admission to IP psych at the time of assessment.

## 2020-02-27 NOTE — H&P ADULT - NSHPSOCIALHISTORY_GEN_ALL_CORE
Pt lives with her . She stated that she has been binge drinking every week since long time. She continuously drinks for 3-4 days when she binge drinks. She is an active smoker. She has been smoking about 10-12 sticks a day for more than 15 years.  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

## 2020-02-27 NOTE — H&P ADULT - ASSESSMENT
Pt is a 34 y/o F from home, with PMH of chronic alcohol abuse ( multiple admissions for alcohol intoxication) , alcohol withdrawal seizure, Depression who was brought by her  for alcohol intoxication.

## 2020-02-27 NOTE — H&P ADULT - ATTENDING COMMENTS
Patient seen/evaluated at bedside. I agree with the resident progress note/outlined plan of care. My independent findings and conclusions are documented.    Vital Signs Last 24 Hrs  T(C): 37.3 (27 Feb 2020 07:37), Max: 37.3 (27 Feb 2020 07:37)  T(F): 99.2 (27 Feb 2020 07:37), Max: 99.2 (27 Feb 2020 07:37)  HR: 92 (27 Feb 2020 07:37) (92 - 109)  BP: 137/80 (27 Feb 2020 07:37) (120/70 - 157/80)  RR: 18 (27 Feb 2020 07:37) (18 - 22)  SpO2: 96% (27 Feb 2020 07:37) (95% - 98%)                          14.6   8.23  )-----------( 407      ( 26 Feb 2020 20:12 )             43.3   02-26    141  |  104  |  8   ----------------------------<  88  3.8   |  26  |  0.49<L>    Ca    8.4      26 Feb 2020 20:12    TPro  7.9  /  Alb  3.7  /  TBili  0.2  /  DBili  x   /  AST  65<H>  /  ALT  132<H>  /  AlkPhos  64  02-26

## 2020-02-27 NOTE — BEHAVIORAL HEALTH ASSESSMENT NOTE - SUICIDE PROTECTIVE FACTORS
Supportive social network of family or friends/Cultural, spiritual and/or moral attitudes against suicide/Positive therapeutic relationships/Engaged in work or school/Has future plans/Identifies reasons for living/Responsibility to family and others

## 2020-02-27 NOTE — H&P ADULT - NEGATIVE OPHTHALMOLOGIC SYMPTOMS
no lacrimation R/no blurred vision L/no diplopia/no photophobia/no blurred vision R/no lacrimation L

## 2020-02-27 NOTE — BEHAVIORAL HEALTH ASSESSMENT NOTE - NSBHCONSULTRECOMMENDOTHER_PSY_A_CORE FT
1. C/w home antidepressant, Lexapro 5 mg qd  2. Withdrawal management as directed by primary team  3. Pt is psych cleared for D/C home as soon as she is medically optimized  4. Pt intends to return to her current psychiatric provider at [facility] after DC  5. Psychiatry is signing off. Reconsult if additional issues arise as inpatient  6. Case d/w Dr. Mejia of primary team    Chayo Lion MD  Director, Consultation-Liaison Psychiatry Service  [id number]

## 2020-02-27 NOTE — BEHAVIORAL HEALTH ASSESSMENT NOTE - NS ED BHA SUICIDALITY PRESENT CURRENT INTENT DETAILS COLLATERAL FT
has consistently expressed concern that pt "might become suicidal," but pt consistently denies this

## 2020-02-27 NOTE — H&P ADULT - PROBLEM SELECTOR PLAN 3
IMPROVE VTE Individual Risk Assessment   RISK                                                          Points  [  ] Previous VTE                                                3  [  ] Thrombophilia                                             2  [  ] Lower limb paralysis                                    2        (unable to hold up >15 seconds)    [  ] Current Cancer                                             2         (within 6 months)  [  x] Immobilization > 24 hrs                              1  [  ] ICU/CCU stay > 24 hours                            1  [ ] Age > 60                                                    1  IMPROVE VTE Score _________ 1  Lovenox for DVT prophylaxis

## 2020-02-27 NOTE — BEHAVIORAL HEALTH ASSESSMENT NOTE - HPI (INCLUDE ILLNESS QUALITY, SEVERITY, DURATION, TIMING, CONTEXT, MODIFYING FACTORS, ASSOCIATED SIGNS AND SYMPTOMS)
36 yo Polish-American F,  and living with  and 15 yo son and working as [place]  for [employer], with PHx of depression and recurrent severe alcohol use DO with multiple recent admissions for alcohol withdrawal at this hospital, known to writer from previous consult on 11/19/19, BIB  on 2/26 for alcohol intoxication and 2 reported seizures at home (denied by pt), and admitted for treatment of withdrawal. Psych consult was requested to r/o SI, which pt denied on arrival, due to 's report to primary team that he "fears she may get suicidal and throw herself down the stairs" if DC'd home prematurely. Pt seen in her room for interview, lying in bed napping but waking easily to voice. Pt proves to be completely oriented and confirms that she is here for alcohol intoxication, but strongly denies SI ("No, I have never wanted to kill myself--my  just makes that stuff up"). Pt confirms that, since prior encounter with writer in 11/2019, she has started seeing a psychiatrist at [facility] on a weekly basis and finds it helpful. Pt says this psychiatrist has switched her SSRI from Celexa 20 mg to Lexapro 5 mg, which she is currently taking and finds helpful. 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, working on roads and bridges). Pt describes mood as "OK" and denies SI/HI/AVH.    Pt's  is encountered outside the room. He expresses strong concern for the severity of pt's alcohol abuse, adding, "I worry about her safety. I wish there was a way to make her go to rehab." MD educates  that rehab is strictly voluntary in this state, and that pt herself must make the decision that she is ready to participate in order to have a meaningful chance of success.  replies, "I'm not happy, but I get what you're saying." MD informs  that pt is psych cleared for DC as soon as she is medically optimized.

## 2020-02-28 VITALS
OXYGEN SATURATION: 97 % | DIASTOLIC BLOOD PRESSURE: 92 MMHG | HEART RATE: 85 BPM | TEMPERATURE: 98 F | SYSTOLIC BLOOD PRESSURE: 128 MMHG | RESPIRATION RATE: 17 BRPM

## 2020-02-28 LAB
ALBUMIN SERPL ELPH-MCNC: 3 G/DL — LOW (ref 3.5–5)
ALP SERPL-CCNC: 50 U/L — SIGNIFICANT CHANGE UP (ref 40–120)
ALT FLD-CCNC: 110 U/L DA — HIGH (ref 10–60)
ANION GAP SERPL CALC-SCNC: 6 MMOL/L — SIGNIFICANT CHANGE UP (ref 5–17)
AST SERPL-CCNC: 87 U/L — HIGH (ref 10–40)
BASOPHILS # BLD AUTO: 0.07 K/UL — SIGNIFICANT CHANGE UP (ref 0–0.2)
BASOPHILS NFR BLD AUTO: 1 % — SIGNIFICANT CHANGE UP (ref 0–2)
BILIRUB SERPL-MCNC: 0.7 MG/DL — SIGNIFICANT CHANGE UP (ref 0.2–1.2)
BUN SERPL-MCNC: 8 MG/DL — SIGNIFICANT CHANGE UP (ref 7–18)
CALCIUM SERPL-MCNC: 8.9 MG/DL — SIGNIFICANT CHANGE UP (ref 8.4–10.5)
CHLORIDE SERPL-SCNC: 104 MMOL/L — SIGNIFICANT CHANGE UP (ref 96–108)
CHOLEST SERPL-MCNC: 269 MG/DL — HIGH (ref 10–199)
CO2 SERPL-SCNC: 28 MMOL/L — SIGNIFICANT CHANGE UP (ref 22–31)
CREAT SERPL-MCNC: 0.45 MG/DL — LOW (ref 0.5–1.3)
EOSINOPHIL # BLD AUTO: 0.13 K/UL — SIGNIFICANT CHANGE UP (ref 0–0.5)
EOSINOPHIL NFR BLD AUTO: 1.9 % — SIGNIFICANT CHANGE UP (ref 0–6)
GLUCOSE SERPL-MCNC: 79 MG/DL — SIGNIFICANT CHANGE UP (ref 70–99)
HBA1C BLD-MCNC: 5.2 % — SIGNIFICANT CHANGE UP (ref 4–5.6)
HCT VFR BLD CALC: 39.4 % — SIGNIFICANT CHANGE UP (ref 34.5–45)
HDLC SERPL-MCNC: 66 MG/DL — SIGNIFICANT CHANGE UP
HGB BLD-MCNC: 13.1 G/DL — SIGNIFICANT CHANGE UP (ref 11.5–15.5)
IMM GRANULOCYTES NFR BLD AUTO: 0.3 % — SIGNIFICANT CHANGE UP (ref 0–1.5)
LIPID PNL WITH DIRECT LDL SERPL: 177 MG/DL — SIGNIFICANT CHANGE UP
LYMPHOCYTES # BLD AUTO: 1.85 K/UL — SIGNIFICANT CHANGE UP (ref 1–3.3)
LYMPHOCYTES # BLD AUTO: 27.1 % — SIGNIFICANT CHANGE UP (ref 13–44)
MAGNESIUM SERPL-MCNC: 2 MG/DL — SIGNIFICANT CHANGE UP (ref 1.6–2.6)
MCHC RBC-ENTMCNC: 31.8 PG — SIGNIFICANT CHANGE UP (ref 27–34)
MCHC RBC-ENTMCNC: 33.2 GM/DL — SIGNIFICANT CHANGE UP (ref 32–36)
MCV RBC AUTO: 95.6 FL — SIGNIFICANT CHANGE UP (ref 80–100)
MONOCYTES # BLD AUTO: 0.49 K/UL — SIGNIFICANT CHANGE UP (ref 0–0.9)
MONOCYTES NFR BLD AUTO: 7.2 % — SIGNIFICANT CHANGE UP (ref 2–14)
NEUTROPHILS # BLD AUTO: 4.27 K/UL — SIGNIFICANT CHANGE UP (ref 1.8–7.4)
NEUTROPHILS NFR BLD AUTO: 62.5 % — SIGNIFICANT CHANGE UP (ref 43–77)
NRBC # BLD: 0 /100 WBCS — SIGNIFICANT CHANGE UP (ref 0–0)
PHOSPHATE SERPL-MCNC: 3.8 MG/DL — SIGNIFICANT CHANGE UP (ref 2.5–4.5)
PLATELET # BLD AUTO: 323 K/UL — SIGNIFICANT CHANGE UP (ref 150–400)
POTASSIUM SERPL-MCNC: 3.5 MMOL/L — SIGNIFICANT CHANGE UP (ref 3.5–5.3)
POTASSIUM SERPL-SCNC: 3.5 MMOL/L — SIGNIFICANT CHANGE UP (ref 3.5–5.3)
PROT SERPL-MCNC: 6.3 G/DL — SIGNIFICANT CHANGE UP (ref 6–8.3)
RBC # BLD: 4.12 M/UL — SIGNIFICANT CHANGE UP (ref 3.8–5.2)
RBC # FLD: 14.1 % — SIGNIFICANT CHANGE UP (ref 10.3–14.5)
SODIUM SERPL-SCNC: 138 MMOL/L — SIGNIFICANT CHANGE UP (ref 135–145)
TOTAL CHOLESTEROL/HDL RATIO MEASUREMENT: 4.1 RATIO — SIGNIFICANT CHANGE UP (ref 3.3–7.1)
TRIGL SERPL-MCNC: 129 MG/DL — SIGNIFICANT CHANGE UP (ref 10–149)
TSH SERPL-MCNC: 1.63 UU/ML — SIGNIFICANT CHANGE UP (ref 0.34–4.82)
VIT B12 SERPL-MCNC: 523 PG/ML — SIGNIFICANT CHANGE UP (ref 232–1245)
WBC # BLD: 6.83 K/UL — SIGNIFICANT CHANGE UP (ref 3.8–10.5)
WBC # FLD AUTO: 6.83 K/UL — SIGNIFICANT CHANGE UP (ref 3.8–10.5)

## 2020-02-28 PROCEDURE — 70450 CT HEAD/BRAIN W/O DYE: CPT

## 2020-02-28 PROCEDURE — 84443 ASSAY THYROID STIM HORMONE: CPT

## 2020-02-28 PROCEDURE — 96372 THER/PROPH/DIAG INJ SC/IM: CPT | Mod: XU

## 2020-02-28 PROCEDURE — 93005 ELECTROCARDIOGRAM TRACING: CPT

## 2020-02-28 PROCEDURE — 96375 TX/PRO/DX INJ NEW DRUG ADDON: CPT

## 2020-02-28 PROCEDURE — 36415 COLL VENOUS BLD VENIPUNCTURE: CPT

## 2020-02-28 PROCEDURE — 80053 COMPREHEN METABOLIC PANEL: CPT

## 2020-02-28 PROCEDURE — 84702 CHORIONIC GONADOTROPIN TEST: CPT

## 2020-02-28 PROCEDURE — 80307 DRUG TEST PRSMV CHEM ANLYZR: CPT

## 2020-02-28 PROCEDURE — 84100 ASSAY OF PHOSPHORUS: CPT

## 2020-02-28 PROCEDURE — 80061 LIPID PANEL: CPT

## 2020-02-28 PROCEDURE — 80048 BASIC METABOLIC PNL TOTAL CA: CPT

## 2020-02-28 PROCEDURE — 83036 HEMOGLOBIN GLYCOSYLATED A1C: CPT

## 2020-02-28 PROCEDURE — 99238 HOSP IP/OBS DSCHRG MGMT 30/<: CPT | Mod: GC

## 2020-02-28 PROCEDURE — 80076 HEPATIC FUNCTION PANEL: CPT

## 2020-02-28 PROCEDURE — 85027 COMPLETE CBC AUTOMATED: CPT

## 2020-02-28 PROCEDURE — 96374 THER/PROPH/DIAG INJ IV PUSH: CPT

## 2020-02-28 PROCEDURE — 83735 ASSAY OF MAGNESIUM: CPT

## 2020-02-28 PROCEDURE — 99285 EMERGENCY DEPT VISIT HI MDM: CPT | Mod: 25

## 2020-02-28 PROCEDURE — 82607 VITAMIN B-12: CPT

## 2020-02-28 RX ORDER — HYDRALAZINE HCL 50 MG
5 TABLET ORAL ONCE
Refills: 0 | Status: COMPLETED | OUTPATIENT
Start: 2020-02-28 | End: 2020-02-28

## 2020-02-28 RX ADMIN — SODIUM CHLORIDE 100 MILLILITER(S): 9 INJECTION, SOLUTION INTRAVENOUS at 05:54

## 2020-02-28 RX ADMIN — Medication 25 MILLIGRAM(S): at 05:53

## 2020-02-28 RX ADMIN — Medication 5 MILLIGRAM(S): at 06:49

## 2020-02-28 RX ADMIN — PANTOPRAZOLE SODIUM 40 MILLIGRAM(S): 20 TABLET, DELAYED RELEASE ORAL at 05:54

## 2020-02-28 NOTE — PROGRESS NOTE ADULT - ASSESSMENT
Pt is a 36 y/o F from home, with PMH of chronic alcohol abuse ( multiple admissions for alcohol intoxication) , alcohol withdrawal seizure, Depression who was brought by her  for alcohol intoxication.

## 2020-02-28 NOTE — PROGRESS NOTE ADULT - SUBJECTIVE AND OBJECTIVE BOX
MS-3 Note   Discussed with supervising resident and primary attending.    Patient is a 35y old  Female who presents with a chief complaint of Alcohol withdrawal (28 Feb 2020 07:51)    INTERVAL HPI/OVERNIGHT EVENTS:  - No acute events overnight  - Hemodynamically stable and afebrile.  - Denies any complaints this morning  - Endorses good appetite    MEDICATIONS  (STANDING):  chlordiazePOXIDE 25 milliGRAM(s) Oral every 8 hours  enoxaparin Injectable 40 milliGRAM(s) SubCutaneous daily  escitalopram 5 milliGRAM(s) Oral daily  folic acid 1 milliGRAM(s) Oral daily  lactated ringers. 1000 milliLiter(s) (100 mL/Hr) IV Continuous <Continuous>  multivitamin 1 Tablet(s) Oral daily  pantoprazole    Tablet 40 milliGRAM(s) Oral before breakfast  thiamine IVPB 500 milliGRAM(s) IV Intermittent daily    MEDICATIONS  (PRN):  LORazepam   Injectable 2 milliGRAM(s) IV Push every 2 hours PRN CIWA>7    Allergies    No Known Allergies    Intolerances    REVIEW OF SYSTEMS:  CONSTITUTIONAL: No fever, weight loss, or fatigue  RESPIRATORY: No cough, wheezing, chills or hemoptysis; No shortness of breath  CARDIOVASCULAR: No chest pain, palpitations, dizziness, or leg swelling  GASTROINTESTINAL: No abdominal or epigastric pain. No nausea, vomiting, or hematemesis; No diarrhea or constipation. No melena or hematochezia.  NEUROLOGICAL: No headaches, memory loss, loss of strength, numbness, or tremors  SKIN: No itching, burning, rashes, or lesions     Vital Signs Last 24 Hrs  T(C): 36.8 (28 Feb 2020 05:21), Max: 37.2 (27 Feb 2020 13:10)  T(F): 98.3 (28 Feb 2020 05:21), Max: 99 (27 Feb 2020 13:10)  HR: 73 (28 Feb 2020 07:05) (71 - 109)  BP: 144/95 (28 Feb 2020 07:05) (143/73 - 160/105)  BP(mean): 111 (28 Feb 2020 07:05) (111 - 111)  RR: 16 (28 Feb 2020 05:21) (16 - 20)  SpO2: 96% (28 Feb 2020 05:21) (95% - 98%)    PHYSICAL EXAM:  GENERAL: NAD, well-groomed, well-developed  HEAD:  Atraumatic, Normocephalic  EYES: EOMI, PERRLA, conjunctiva and sclera clear  NECK: Supple, No JVD, Normal thyroid  CHEST/LUNG: Clear to percussion bilaterally; No rales, rhonchi, wheezing, or rubs  HEART: Regular rate and rhythm; No murmurs, rubs, or gallops  ABDOMEN: Soft, Nontender, Nondistended; Bowel sounds present in all 4 quads  NERVOUS SYSTEM:  Alert & Oriented X3, Good concentration; Motor Strength 5/5 B/L   EXTREMITIES:  2+ Peripheral Pulses, No clubbing, cyanosis, or edema    LABS:                        13.1   6.83  )-----------( 323      ( 28 Feb 2020 06:06 )             39.4     02-28    138  |  104  |  8   ----------------------------<  79  3.5   |  28  |  0.45<L>    Ca    8.9      28 Feb 2020 06:06  Phos  3.8     02-28  Mg     2.0     02-28    TPro  6.3  /  Alb  3.0<L>  /  TBili  0.7  /  DBili  x   /  AST  87<H>  /  ALT  110<H>  /  AlkPhos  50  02-28    CAPILLARY BLOOD GLUCOSE    RADIOLOGY & ADDITIONAL TESTS:    Imaging Personally Reviewed:  [ ] YES  [ ] NO    Consultant(s) Notes Reviewed:  [ ] YES  [ ] NO

## 2020-02-28 NOTE — DISCHARGE NOTE PROVIDER - NSDCMRMEDTOKEN_GEN_ALL_CORE_FT
escitalopram 5 mg oral tablet: 1 tab(s) orally once a day  folic acid 0.4 mg oral tablet: 1 tab(s) orally once a day   pantoprazole 40 mg oral delayed release tablet: 1 tab(s) orally once a day (before a meal)  thiamine 100 mg oral tablet: 1 tab(s) orally once a day

## 2020-02-28 NOTE — DISCHARGE NOTE PROVIDER - HOSPITAL COURSE
HPI:    Pt is a 36 y/o F from home, with PMH of chronic alcohol abuse ( multiple admissions for alcohol intoxication) , alcohol withdrawal seizure, Depression who was brought by her  for alcohol intoxication. Pt was AOx3 at bedside.  She  stated that she was brought by her  to ED as she was drinking alcohol continuously for 2 days and her  wanted her to be safe. As per ED provider's note pt;s  complained of 2 episodes of seizures at home. But, pt denied any seizure episodes. She mentioned that she has been on and off binge drinking for a long time.    last time she drank alcohol was yesterday. She was drinking a bottle of tequila a day.  She stated that she has guilt of drinking alcohol and wants to quit. She feels irritated when people tells her to quit drinking. She mentioned that she has depression and takes medication for depression. She feels stressed at home as her  keeps calling his son who takes drugs.     She denied chest pain, shortness of breath, nausea, vomiting, abdominal pain and other complains. (27 Feb 2020 11:09)         Pt got admitted due to the concern of her safety after she drank for 2 days and was brought in by her . Your last drinks was on 2/26. She was monitored for any with drawl signs with management with librium, PRN ativan ,iv fluids, vitamins. Pt was seen by phychiatrist for any slef harming ideation and pt was cleared by phyc to be discharged. She required one time ativan of 2mg over 24 hr with your 25mg q8 librium. Nest step was to james down librium  but she wanted to leave AMA. She was  EXPLAINED ALL THE RISKS AND HARMS OF LEAVING AMA. She signed the AMA form with witness nurse and doctor resident.

## 2020-02-28 NOTE — DISCHARGE NOTE PROVIDER - NSDCCPCAREPLAN_GEN_ALL_CORE_FT
PRINCIPAL DISCHARGE DIAGNOSIS  Diagnosis: Alcohol withdrawal  Assessment and Plan of Treatment: You got admitted due to the concern of your safty after you drank for 2 days and was brought in by your .You were seen by psychiatrist for any self harming ideation and  was cleared by psych to be discharged. Your last drinks was on 2/26. We monitored you for any withdrawl signs with managment with librium, PRN ativan ,iv fluids, vitamins. You required one time ativan of 2mg over 24 hr with your 25mg q8 librium. We needed to tapperdown your librium todaya but you wanted to leave AMA.YOU WERE EXPLAINED ALL THE RISKS AND HARMS OF LEAVING AMA. You signed the AMA form with [facility] nurse and doctor resident.  Please try to cut back on Alcohol AS  can cause a lot of side effects in your body. It can cause cancer, liver failure, ulcer, pancreatitis, tremors, withdrawal effects, dependence, ,      SECONDARY DISCHARGE DIAGNOSES  Diagnosis: Depression  Assessment and Plan of Treatment: You have a hx of Depression for which you take esitalopram at home whihc was continued during yuor stay. You decided to leave AMA so please followup with your PCP WITHIN  week of discharge.

## 2020-02-28 NOTE — PROGRESS NOTE ADULT - SUBJECTIVE AND OBJECTIVE BOX
PGY1 Note discussed with Supervising Resident and Primary Attending.    Patient is a 35y old  Female who presents with a chief complaint of Alcohol withdrawal (27 Feb 2020 11:09)      INTERVAL HPI/OVERNIGHT EVENTS :    No acute event overnight reported by overnight team and nurses. Pt remained hemodynamically stable.  Pt seen and examined  at bed side. All concerns and questions answered.   Report no new complaint. Pt denies any fever, nausea, vomiting.  Explained all test results and plan.  Appreciate all consults.     ***********************************************************************************************************    MEDICATIONS  (STANDING):  chlordiazePOXIDE 25 milliGRAM(s) Oral every 8 hours  enoxaparin Injectable 40 milliGRAM(s) SubCutaneous daily  escitalopram 5 milliGRAM(s) Oral daily  folic acid 1 milliGRAM(s) Oral daily  lactated ringers. 1000 milliLiter(s) (100 mL/Hr) IV Continuous <Continuous>  multivitamin 1 Tablet(s) Oral daily  pantoprazole    Tablet 40 milliGRAM(s) Oral before breakfast  thiamine IVPB 500 milliGRAM(s) IV Intermittent daily    MEDICATIONS  (PRN):  LORazepam   Injectable 2 milliGRAM(s) IV Push every 2 hours PRN CIWA>7      ***********************************************************************************************************    Allergies    No Known Allergies    Intolerances        ***********************************************************************************************************    REVIEW OF SYSTEMS :  * CONSTITUTIONAL      : No Fever, Weight loss, or Fatigue  * EYES                             : No eye pain , Visual disturbances or Discharge  * RESPIRATORY             : No Cough, Wheezing, Chills or Hemoptysis; No shortness of breath  * CARDIOVASCULAR     : No Chest pain, Palpitations, Dizziness, or Leg swelling  * GASTROINTESTINAL  : No Abdominal or Epigastric pain. No Nausea, Vomiting or Hematemesis; No Diarrhea or Constipation. No Melena or Hematochezia.  * GENITOURINARY        : No Dysuria , Frequency , Haematuria   * NEUROLOGICAL          : No Headaches, Memory loss, Loss of strength, Numbness, or Tremors  * MUSCULOSKELETAL   : No Joint pain  * PSYCHIATRY                 : No Depression or Anxiety   * HEME/LYMPH              : No Easy Bruising or Bleeding gums  * SKIN                               : No Itching, Burning, Rashes, or Lesions     ***********************************************************************************************************    Vital Signs Last 24 Hrs  T(C): 36.8 (28 Feb 2020 05:21), Max: 37.2 (27 Feb 2020 13:10)  T(F): 98.3 (28 Feb 2020 05:21), Max: 99 (27 Feb 2020 13:10)  HR: 73 (28 Feb 2020 07:05) (71 - 109)  BP: 144/95 (28 Feb 2020 07:05) (143/73 - 160/105)  BP(mean): 111 (28 Feb 2020 07:05) (111 - 111)  RR: 16 (28 Feb 2020 05:21) (16 - 20)  SpO2: 96% (28 Feb 2020 05:21) (95% - 98%)    ***********************************************************************************************************    PHYSICAL EXAM :  * GENERAL                 : NAD, Well-groomed, Well-developed  * HEAD                       :  Atraumatic, Normocephalic  * EYES                         : EOMI, PERRLA, Conjunctiva and Sclera clear  * ENT                           : Moist Mucous Membranes  * NECK                         : Supple, No JVD, Normal Thyroid  * CHEST/LUNG           : Clear to Auscultation bilaterally; No Rales, Rhonchi, Wheezing or Rubs  * HEART                       : Regular Rate and Rhythm; No murmurs, Rubs or gallops  * ABDOMEN                : Soft, Non-tender, Non-distended; Bowel Sounds present  * NERVOUS SYSTEM  :  Alert & Oriented X3, Good Concentration; Motor Strength 5/5 B/L UL LL ; DTRs 2+ Intact and Symmetric  * EXTREMITIES            :  2+ Peripheral Pulses, No clubbing, cyanosis, or edema  * SKIN                           : No Rashes or Lesions    **********************************************************************************************************  LABS:                          13.1   6.83  )-----------( 323      ( 28 Feb 2020 06:06 )             39.4     02-28    138  |  104  |  8   ----------------------------<  79  3.5   |  28  |  0.45<L>    Ca    8.9      28 Feb 2020 06:06  Phos  3.8     02-28  Mg     2.0     02-28    TPro  6.3  /  Alb  3.0<L>  /  TBili  0.7  /  DBili  x   /  AST  87<H>  /  ALT  110<H>  /  AlkPhos  50  02-28        CAPILLARY BLOOD GLUCOSE          **********************************************************************************************************    RADIOLOGY & ADDITIONAL TESTS:   No radiological imaging was required    Imaging Personally Reviewed   :  [ ] YES  [ ] NO    Consultant(s) Notes Reviewed :  [ ] YES  [ ] NO

## 2020-02-28 NOTE — DISCHARGE NOTE NURSING/CASE MANAGEMENT/SOCIAL WORK - PATIENT PORTAL LINK FT
You can access the FollowMyHealth Patient Portal offered by [facility] by registering at the following website: [email or web address]. By joining [facility]’s FollowMyHealth portal, you will also be able to view your health information using other applications (apps) compatible with our system.

## 2020-03-05 ENCOUNTER — EMERGENCY (EMERGENCY)
Facility: HOSPITAL | Age: 35
LOS: 1 days | Discharge: HOME | End: 2020-03-05
Attending: EMERGENCY MEDICINE
Payer: MEDICAID

## 2020-03-05 VITALS
DIASTOLIC BLOOD PRESSURE: 90 MMHG | TEMPERATURE: 97 F | OXYGEN SATURATION: 97 % | HEART RATE: 120 BPM | SYSTOLIC BLOOD PRESSURE: 149 MMHG | RESPIRATION RATE: 19 BRPM | HEIGHT: 63 IN

## 2020-03-05 VITALS
TEMPERATURE: 98 F | SYSTOLIC BLOOD PRESSURE: 136 MMHG | RESPIRATION RATE: 18 BRPM | HEART RATE: 106 BPM | OXYGEN SATURATION: 98 % | DIASTOLIC BLOOD PRESSURE: 63 MMHG

## 2020-03-05 PROCEDURE — 99285 EMERGENCY DEPT VISIT HI MDM: CPT

## 2020-03-05 PROCEDURE — 99285 EMERGENCY DEPT VISIT HI MDM: CPT | Mod: 25

## 2020-03-05 PROCEDURE — 70450 CT HEAD/BRAIN W/O DYE: CPT | Mod: 26

## 2020-03-05 PROCEDURE — 82962 GLUCOSE BLOOD TEST: CPT

## 2020-03-05 PROCEDURE — 70450 CT HEAD/BRAIN W/O DYE: CPT

## 2020-03-05 PROCEDURE — 96372 THER/PROPH/DIAG INJ SC/IM: CPT

## 2020-03-05 RX ORDER — HALOPERIDOL DECANOATE 100 MG/ML
5 INJECTION INTRAMUSCULAR ONCE
Refills: 0 | Status: COMPLETED | OUTPATIENT
Start: 2020-03-05 | End: 2020-03-05

## 2020-03-05 RX ADMIN — Medication 25 MILLIGRAM(S): at 08:48

## 2020-03-05 RX ADMIN — HALOPERIDOL DECANOATE 5 MILLIGRAM(S): 100 INJECTION INTRAMUSCULAR at 00:48

## 2020-03-05 RX ADMIN — Medication 2 MILLIGRAM(S): at 00:48

## 2020-03-05 NOTE — ED PROVIDER NOTE - PROGRESS NOTE DETAILS
sleeping. not ambulating yet. patient awake, denies SI/HI, she is calm cooperative. no tongue fasiculations. Clinically ready to go home. She called her , who then spoke with me and said he wants her to be evaluated by telepsych and give her medications. He is unable to pick her up intil 3pm after work. she does not have clothes or keys to get to the house. Will call social work. I had an at length conversation with patient who admits she has a drinking problem, states her  is very controlling. denies that he physically abuses her.  came and dropped off clothes and keys. patient stable for discharge

## 2020-03-05 NOTE — ED PROVIDER NOTE - CLINICAL SUMMARY MEDICAL DECISION MAKING FREE TEXT BOX
35 year old female with etoh intoxication. vitals WNL. PE as above.  given haldol/ativan for agitation. on 1:1.

## 2020-03-05 NOTE — ED ADULT NURSE NOTE - NSIMPLEMENTINTERV_GEN_ALL_ED
Implemented All Fall Risk Interventions:  [place] to call system. Call bell, personal items and telephone within reach. Instruct patient to call for assistance. Room bathroom lighting operational. Non-slip footwear when patient is off stretcher. Physically safe environment: no spills, clutter or unnecessary equipment. Stretcher in lowest position, wheels locked, appropriate side rails in place. Provide visual cue, wrist band, yellow gown, etc. Monitor gait and stability. Monitor for mental status changes and reorient to person, place, and time. Review medications for side effects contributing to fall risk. Reinforce activity limits and safety measures with patient and family.

## 2020-03-05 NOTE — ED PROVIDER NOTE - PATIENT PORTAL LINK FT
You can access the FollowMyHealth Patient Portal offered by [facility] by registering at the following website: [email or web address]. By joining [employer]’s FollowMyHealth portal, you will also be able to view your health information using other applications (apps) compatible with our system.

## 2020-03-05 NOTE — ED PROVIDER NOTE - OBJECTIVE STATEMENT
35 year old female PMh etoh abuse, etoh withdrawal seizures, depression BIB spouse for etoh intoxication. pt combative and uncooperative with exam. as per  pt has hx of etoh abuse and hx of self-injurious behavior although states not recently.

## 2020-03-14 NOTE — H&P ADULT - HISTORY OF PRESENT ILLNESS
34 y F from home  with history of EtOH abuse and depression was BIB  for EtOH intoxication today.History obtained from ED provider note , no family at bedside present. Pt has multiple admissions in the last few months for same problem.As per , she has been drinking bottles daily for the past week. Pt was combative on arrival to ED so pt was sedated and put on restraints by The ED physician. Pt had EEG and MRI brain done on the last adm which were normal     ED course : Pt persistently tachycardic to 120s .. Afebrile , no wbc count , Labs noted for  Hb 15.4 , HCO3 21 . CXR : clear . Pt got ativan IV , versed IM x 2 , Benadryl , haldol in the ED . UTox +ve for BZD . Pt protecting airway [de-identified] : recalled for labs  [FreeTextEntry6] : 17 year old female recalled for review of labs done at [facility] 2/13/20. Pt's Hemoglobin A1C increased from 5.4% to 5.8% in past year. \par \par Pt reports slight increase in thirst recently. Pt denies increased frequency of urination. \par \par Pt denies family history of diabetes. \par \par Pt drinks mostly water. Pt sometimes drink cranberry juice daily. \par \par Food shopping is done by parents. Pt's mother does most of cooking. Pt eats breakfast - pt typically eats a sandwich from the deli (roll with chicken cutlet, lettuce, tomato or connolly). Pt does not eat school lunch or a snack after school. Pt's eats dinner at home cooked by home (chicken [place] with whole wheat pasta). Pt eats ice cream for dessert. \par \par Pt shared that her mother is trying to loose weight and wants pt to join her in her weight loss efforts including intermittent fasting. Pt shared that she has heard people telling her she needs to loose weight for a long time. Pt reports that it is upsetting when her mother talks with her about diets.

## 2020-05-29 ENCOUNTER — EMERGENCY (EMERGENCY)
Facility: HOSPITAL | Age: 35
LOS: 1 days | Discharge: HOME | End: 2020-05-29
Attending: EMERGENCY MEDICINE
Payer: MEDICAID

## 2020-05-29 VITALS
SYSTOLIC BLOOD PRESSURE: 127 MMHG | HEART RATE: 114 BPM | OXYGEN SATURATION: 94 % | TEMPERATURE: 98 F | RESPIRATION RATE: 18 BRPM | DIASTOLIC BLOOD PRESSURE: 81 MMHG

## 2020-05-29 PROCEDURE — 70450 CT HEAD/BRAIN W/O DYE: CPT | Mod: 26

## 2020-05-29 PROCEDURE — 99284 EMERGENCY DEPT VISIT MOD MDM: CPT

## 2020-05-29 NOTE — ED PROVIDER NOTE - PATIENT PORTAL LINK FT
You can access the FollowMyHealth Patient Portal offered by [facility] by registering at the following website: [email or web address]. By joining [email or web address]’s FollowMyHealth portal, you will also be able to view your health information using other applications (apps) compatible with our system.

## 2020-05-29 NOTE — ED PROVIDER NOTE - OBJECTIVE STATEMENT
35 year old female PMh etoh abuse, etoh withdrawal seizures, depression BIB for seizure 1 hr pta and per  who states pt has been drinking heavy and verbalize si.  pt frequents the ed and comes in with similar complains. pt denies any si now.

## 2020-05-29 NOTE — ED PROVIDER NOTE - CLINICAL SUMMARY MEDICAL DECISION MAKING FREE TEXT BOX
35 year old female PMh etoh abuse, etoh withdrawal seizures, depression BIB for seizure 1 hr pta and per  who states pt has been drinking heavy and verbalize si.  pt frequents the ed and comes in with similar complains. pt denies any si now.    intox, no sign of trauma.  will obtain ct head, ekg and fs. monitor for sobriety. and re-assess if any si

## 2020-05-29 NOTE — ED PROVIDER NOTE - PROGRESS NOTE DETAILS
Dominguez: ct head no acute ich, frontal hematoma (extracranial).  pt sleeping, arousable, snoring. s/o to Dr Gonzalez to f/u sobriety and re-assess if still verbalizing any psych distress. Lisa: pt awake and aaox3. denies SI, HI, all other psych conplaints. states she just wants to go home. doesn't want to speak with a psych. given librium to prevent withdrawal. will discharge. Lisa: pt awake and aaox3, clinically sober, ambulatory with a steady gait. denies SI, HI, all other psych complaints. states she just wants to go home. doesn't want to speak with a psych. given librium to prevent withdrawal. will discharge.

## 2020-05-29 NOTE — ED PROVIDER NOTE - NSFOLLOWUPCLINICS_GEN_ALL_ED_FT
[facility]  Detox  [address] [place] [address]  Phone: [phone]  Fax: [phone]  Follow Up Time:     [facility]  Detox  [address] [place] [address]  Phone: [phone]  Fax: [phone]  Follow Up Time:

## 2020-05-29 NOTE — ED PROVIDER NOTE - CONSTITUTIONAL, MLM
normal... awake, alert, oriented to person, place, time/situation and in no apparent distress. intox, AOB

## 2020-05-30 VITALS
HEART RATE: 94 BPM | DIASTOLIC BLOOD PRESSURE: 92 MMHG | TEMPERATURE: 98 F | RESPIRATION RATE: 18 BRPM | SYSTOLIC BLOOD PRESSURE: 128 MMHG | OXYGEN SATURATION: 96 %

## 2020-05-30 PROCEDURE — 70450 CT HEAD/BRAIN W/O DYE: CPT

## 2020-05-30 PROCEDURE — 99285 EMERGENCY DEPT VISIT HI MDM: CPT | Mod: 25

## 2020-05-30 PROCEDURE — 93005 ELECTROCARDIOGRAM TRACING: CPT

## 2020-05-30 PROCEDURE — 82962 GLUCOSE BLOOD TEST: CPT

## 2020-05-30 RX ADMIN — Medication 75 MILLIGRAM(S): at 04:14

## 2020-07-06 ENCOUNTER — EMERGENCY (EMERGENCY)
Facility: HOSPITAL | Age: 35
LOS: 1 days | Discharge: HOME | End: 2020-07-06
Attending: EMERGENCY MEDICINE
Payer: MEDICAID

## 2020-07-06 PROCEDURE — 99285 EMERGENCY DEPT VISIT HI MDM: CPT | Mod: 25

## 2020-07-07 VITALS
TEMPERATURE: 98 F | DIASTOLIC BLOOD PRESSURE: 77 MMHG | HEART RATE: 98 BPM | SYSTOLIC BLOOD PRESSURE: 118 MMHG | RESPIRATION RATE: 18 BRPM | OXYGEN SATURATION: 97 %

## 2020-07-07 VITALS — WEIGHT: 199.96 LBS | HEIGHT: 68 IN

## 2020-07-07 LAB
ALBUMIN SERPL ELPH-MCNC: 3.4 G/DL — LOW (ref 3.5–5)
ALP SERPL-CCNC: 56 U/L — SIGNIFICANT CHANGE UP (ref 40–120)
ALT FLD-CCNC: 66 U/L DA — HIGH (ref 10–60)
ANION GAP SERPL CALC-SCNC: 7 MMOL/L — SIGNIFICANT CHANGE UP (ref 5–17)
AST SERPL-CCNC: 38 U/L — SIGNIFICANT CHANGE UP (ref 10–40)
BASOPHILS # BLD AUTO: 0.06 K/UL — SIGNIFICANT CHANGE UP (ref 0–0.2)
BASOPHILS NFR BLD AUTO: 0.6 % — SIGNIFICANT CHANGE UP (ref 0–2)
BILIRUB SERPL-MCNC: 0.1 MG/DL — LOW (ref 0.2–1.2)
BUN SERPL-MCNC: 9 MG/DL — SIGNIFICANT CHANGE UP (ref 7–18)
CALCIUM SERPL-MCNC: 8.5 MG/DL — SIGNIFICANT CHANGE UP (ref 8.4–10.5)
CHLORIDE SERPL-SCNC: 109 MMOL/L — HIGH (ref 96–108)
CO2 SERPL-SCNC: 28 MMOL/L — SIGNIFICANT CHANGE UP (ref 22–31)
CREAT SERPL-MCNC: 0.64 MG/DL — SIGNIFICANT CHANGE UP (ref 0.5–1.3)
EOSINOPHIL # BLD AUTO: 0.4 K/UL — SIGNIFICANT CHANGE UP (ref 0–0.5)
EOSINOPHIL NFR BLD AUTO: 4.1 % — SIGNIFICANT CHANGE UP (ref 0–6)
ETHANOL SERPL-MCNC: 317 MG/DL — HIGH (ref 0–10)
GLUCOSE SERPL-MCNC: 92 MG/DL — SIGNIFICANT CHANGE UP (ref 70–99)
HCG SERPL-ACNC: <1 MIU/ML — SIGNIFICANT CHANGE UP
HCT VFR BLD CALC: 40.3 % — SIGNIFICANT CHANGE UP (ref 34.5–45)
HGB BLD-MCNC: 14.1 G/DL — SIGNIFICANT CHANGE UP (ref 11.5–15.5)
IMM GRANULOCYTES NFR BLD AUTO: 0.3 % — SIGNIFICANT CHANGE UP (ref 0–1.5)
LYMPHOCYTES # BLD AUTO: 4.54 K/UL — HIGH (ref 1–3.3)
LYMPHOCYTES # BLD AUTO: 46.4 % — HIGH (ref 13–44)
MCHC RBC-ENTMCNC: 31.8 PG — SIGNIFICANT CHANGE UP (ref 27–34)
MCHC RBC-ENTMCNC: 35 GM/DL — SIGNIFICANT CHANGE UP (ref 32–36)
MCV RBC AUTO: 90.8 FL — SIGNIFICANT CHANGE UP (ref 80–100)
MONOCYTES # BLD AUTO: 0.59 K/UL — SIGNIFICANT CHANGE UP (ref 0–0.9)
MONOCYTES NFR BLD AUTO: 6 % — SIGNIFICANT CHANGE UP (ref 2–14)
NEUTROPHILS # BLD AUTO: 4.16 K/UL — SIGNIFICANT CHANGE UP (ref 1.8–7.4)
NEUTROPHILS NFR BLD AUTO: 42.6 % — LOW (ref 43–77)
NRBC # BLD: 0 /100 WBCS — SIGNIFICANT CHANGE UP (ref 0–0)
PLATELET # BLD AUTO: 293 K/UL — SIGNIFICANT CHANGE UP (ref 150–400)
POTASSIUM SERPL-MCNC: 3.7 MMOL/L — SIGNIFICANT CHANGE UP (ref 3.5–5.3)
POTASSIUM SERPL-SCNC: 3.7 MMOL/L — SIGNIFICANT CHANGE UP (ref 3.5–5.3)
PROT SERPL-MCNC: 7.3 G/DL — SIGNIFICANT CHANGE UP (ref 6–8.3)
RBC # BLD: 4.44 M/UL — SIGNIFICANT CHANGE UP (ref 3.8–5.2)
RBC # FLD: 13.3 % — SIGNIFICANT CHANGE UP (ref 10.3–14.5)
SODIUM SERPL-SCNC: 144 MMOL/L — SIGNIFICANT CHANGE UP (ref 135–145)
WBC # BLD: 9.78 K/UL — SIGNIFICANT CHANGE UP (ref 3.8–10.5)
WBC # FLD AUTO: 9.78 K/UL — SIGNIFICANT CHANGE UP (ref 3.8–10.5)

## 2020-07-07 PROCEDURE — 36415 COLL VENOUS BLD VENIPUNCTURE: CPT

## 2020-07-07 PROCEDURE — 80053 COMPREHEN METABOLIC PANEL: CPT

## 2020-07-07 PROCEDURE — 96372 THER/PROPH/DIAG INJ SC/IM: CPT

## 2020-07-07 PROCEDURE — 99285 EMERGENCY DEPT VISIT HI MDM: CPT | Mod: 25

## 2020-07-07 PROCEDURE — 84702 CHORIONIC GONADOTROPIN TEST: CPT

## 2020-07-07 PROCEDURE — 85027 COMPLETE CBC AUTOMATED: CPT

## 2020-07-07 PROCEDURE — 80307 DRUG TEST PRSMV CHEM ANLYZR: CPT

## 2020-07-07 PROCEDURE — 82962 GLUCOSE BLOOD TEST: CPT

## 2020-07-07 RX ORDER — HALOPERIDOL DECANOATE 100 MG/ML
5 INJECTION INTRAMUSCULAR ONCE
Refills: 0 | Status: COMPLETED | OUTPATIENT
Start: 2020-07-07 | End: 2020-07-07

## 2020-07-07 RX ORDER — DIPHENHYDRAMINE HCL 50 MG
50 CAPSULE ORAL ONCE
Refills: 0 | Status: COMPLETED | OUTPATIENT
Start: 2020-07-07 | End: 2020-07-07

## 2020-07-07 RX ORDER — SODIUM CHLORIDE 9 MG/ML
3 INJECTION INTRAMUSCULAR; INTRAVENOUS; SUBCUTANEOUS ONCE
Refills: 0 | Status: COMPLETED | OUTPATIENT
Start: 2020-07-07 | End: 2020-07-07

## 2020-07-07 RX ADMIN — Medication 2 MILLIGRAM(S): at 00:36

## 2020-07-07 RX ADMIN — SODIUM CHLORIDE 3 MILLILITER(S): 9 INJECTION INTRAMUSCULAR; INTRAVENOUS; SUBCUTANEOUS at 01:57

## 2020-07-07 RX ADMIN — Medication 50 MILLIGRAM(S): at 00:36

## 2020-07-07 RX ADMIN — HALOPERIDOL DECANOATE 5 MILLIGRAM(S): 100 INJECTION INTRAMUSCULAR at 00:36

## 2020-07-07 NOTE — ED PROVIDER NOTE - NEUROLOGICAL LEVEL OF CONSCIOUSNESS
Awake, agitated, tearful, screaming out husbands name, combative w/ staff, and moving all extremities spontaneously

## 2020-07-07 NOTE — ED PROVIDER NOTE - CONSTITUTIONAL MENTATION
awake/Awake, agitated, tearful, screaming out husbands name, combative w/ staff, and moving all extremities spontaneously

## 2020-07-07 NOTE — ED ADULT NURSE REASSESSMENT NOTE - NS ED NURSE REASSESS COMMENT FT1
Spoke with  regarding .  Family member rude, disrespectful and demanding withdrawal medications because 'she is only going to drink again'.  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   states he will be here in 30 minutes to escort patient home but threatened to return her tonight.  Dr. Wright made aware of conversation.

## 2020-07-07 NOTE — ED PROVIDER NOTE - CLINICAL SUMMARY MEDICAL DECISION MAKING FREE TEXT BOX
36 y/o F patient presents to the ED w/ x2 seizures in the setting of alcohol intoxication. Patient is awake, alert, and agitated. Will provide patient w/ IM medicine for sedation, obtain labs, and observe for recurrence of seizure and sobriety. 34 y/o F patient presents to the ED w/ x2 seizures in the setting of alcohol intoxication. Patient is awake, alert, and agitated. Will provide patient w/ IM medicine for sedation, obtain labs, and observe for recurrence of seizure and sobriety.    labs show serum alcohol 317  @6am on reeval patient sleeping, drowsy upon awakening with verbal stimuli, will continue to monitor for sobriety 34 y/o F patient presents to the ED w/ x2 seizures in the setting of alcohol intoxication. Patient is awake, alert, and agitated. Will provide patient w/ IM medicine for sedation, obtain labs, and observe for recurrence of seizure and sobriety.    labs show serum alcohol 317  @6am on reeval patient sleeping, drowsy upon awakening with verbal stimuli, will continue to monitor for sobriety  @730am on reeval patient awakens easily, is alert and oriented x3, ambulates without difficulty. discussed with patient interest in detox but patient declines and requests discharge home. Will give librium to prevent acute withdrawal symptoms.  Called  and left message regarding picking up patient from ED.

## 2020-07-07 NOTE — CHART NOTE - NSCHARTNOTEFT_GEN_A_CORE
Patient is a 35 year old female, presented to [facility] ED with alcohol intoxication 7/7/2020. Patient referred to SW for ETOH and positive SBIRT; SBIRT addressed/completed in [place].  Patient well known to this SW; with frequent ED visits and admissions for ETOH, withdrawal and seizures. Patient resides with a spouse (Gallito Carter, [phone]) and reports she is currently in outpatient mental health and alcohol treatment at [facility] in [place].  Patient declined the need for SW intervention.

## 2020-07-07 NOTE — ED PROVIDER NOTE - PATIENT PORTAL LINK FT
You can access the FollowMyHealth Patient Portal offered by [facility] by registering at the following website: [email or web address]. By joining [employer]’s FollowMyHealth portal, you will also be able to view your health information using other applications (apps) compatible with our system.

## 2020-07-07 NOTE — ED PROVIDER NOTE - OBJECTIVE STATEMENT
34 y/o F patient presents to the ED w/ alcohol intoxication today(7/7/2020). Patient's  reports patient has x2 seizures and has been drinking for the past week. Patient's  reports last drink was at 10:30pm(7/6/2020) where patient was non-responsive immediately after and foaming at the mouth. Patient's  reports seizure recurred x30 mins later and called EMS. As per EMS, patient was intoxicated, awake, not post-ictal, and combative with EMS refusing to be brought to the ED. Patient's  insisted for evaluation. Patient's  noted patient was admitted for detox at [facility]. NKDA.

## 2020-07-07 NOTE — ED PROVIDER NOTE - NSFOLLOWUPINSTRUCTIONS_ED_ALL_ED_FT
Avoid drinking alcohol in excess.  When you decide to seek detox treatment, call clinic above for intake appointment.  Return to ED if you develop severe withdrawal symptoms such as vomiting, severe tremors or seizures.

## 2020-07-07 NOTE — SBIRT NOTE ADULT - NSSBIRTALCNOACTINTDET_GEN_A_CORE
Declined referral to treatment; reports she is in active treatment for mental health and alcohol use at [facility] in [place]

## 2020-08-07 NOTE — ED PROVIDER NOTE - GASTROINTESTINAL, MLM
Abdomen soft, non-tender, no guarding.
Implemented All Universal Safety Interventions:  [place] to call system. Call bell, personal items and telephone within reach. Instruct patient to call for assistance. Room bathroom lighting operational. Non-slip footwear when patient is off stretcher. Physically safe environment: no spills, clutter or unnecessary equipment. Stretcher in lowest position, wheels locked, appropriate side rails in place.

## 2020-09-23 ENCOUNTER — INPATIENT (INPATIENT)
Facility: HOSPITAL | Age: 35
LOS: 1 days | Discharge: HOME | DRG: 897 | End: 2020-09-25
Attending: INTERNAL MEDICINE | Admitting: INTERNAL MEDICINE
Payer: MEDICAID

## 2020-09-23 VITALS
DIASTOLIC BLOOD PRESSURE: 84 MMHG | SYSTOLIC BLOOD PRESSURE: 121 MMHG | RESPIRATION RATE: 18 BRPM | HEART RATE: 128 BPM | WEIGHT: 179.9 LBS | HEIGHT: 65 IN | TEMPERATURE: 98 F | OXYGEN SATURATION: 98 %

## 2020-09-23 DIAGNOSIS — F10.239 ALCOHOL DEPENDENCE WITH WITHDRAWAL, UNSPECIFIED: ICD-10-CM

## 2020-09-23 DIAGNOSIS — F10.10 ALCOHOL ABUSE, UNCOMPLICATED: ICD-10-CM

## 2020-09-23 DIAGNOSIS — R56.9 UNSPECIFIED CONVULSIONS: ICD-10-CM

## 2020-09-23 DIAGNOSIS — Z29.9 ENCOUNTER FOR PROPHYLACTIC MEASURES, UNSPECIFIED: ICD-10-CM

## 2020-09-23 DIAGNOSIS — F32.9 MAJOR DEPRESSIVE DISORDER, SINGLE EPISODE, UNSPECIFIED: ICD-10-CM

## 2020-09-23 DIAGNOSIS — R74.0 NONSPECIFIC ELEVATION OF LEVELS OF TRANSAMINASE AND LACTIC ACID DEHYDROGENASE [LDH]: ICD-10-CM

## 2020-09-23 LAB
ACETONE SERPL-MCNC: NEGATIVE — SIGNIFICANT CHANGE UP
ALBUMIN SERPL ELPH-MCNC: 2.9 G/DL — LOW (ref 3.5–5)
ALP SERPL-CCNC: 53 U/L — SIGNIFICANT CHANGE UP (ref 40–120)
ALT FLD-CCNC: 131 U/L DA — HIGH (ref 10–60)
ANION GAP SERPL CALC-SCNC: 5 MMOL/L — SIGNIFICANT CHANGE UP (ref 5–17)
APAP SERPL-MCNC: <2 UG/ML — LOW (ref 10–30)
AST SERPL-CCNC: 66 U/L — HIGH (ref 10–40)
BASOPHILS # BLD AUTO: 0.04 K/UL — SIGNIFICANT CHANGE UP (ref 0–0.2)
BASOPHILS NFR BLD AUTO: 0.5 % — SIGNIFICANT CHANGE UP (ref 0–2)
BILIRUB DIRECT SERPL-MCNC: 0.1 MG/DL — SIGNIFICANT CHANGE UP (ref 0–0.2)
BILIRUB SERPL-MCNC: <0.1 MG/DL — LOW (ref 0.2–1.2)
BUN SERPL-MCNC: 7 MG/DL — SIGNIFICANT CHANGE UP (ref 7–18)
CALCIUM SERPL-MCNC: 7.4 MG/DL — LOW (ref 8.4–10.5)
CARBAMAZEPINE SERPL-MCNC: <0.5 UG/ML — LOW (ref 4–12)
CHLORIDE SERPL-SCNC: 110 MMOL/L — HIGH (ref 96–108)
CHOLEST SERPL-MCNC: 237 MG/DL — HIGH (ref 10–199)
CO2 SERPL-SCNC: 28 MMOL/L — SIGNIFICANT CHANGE UP (ref 22–31)
CREAT SERPL-MCNC: 0.56 MG/DL — SIGNIFICANT CHANGE UP (ref 0.5–1.3)
EOSINOPHIL # BLD AUTO: 0.21 K/UL — SIGNIFICANT CHANGE UP (ref 0–0.5)
EOSINOPHIL NFR BLD AUTO: 2.7 % — SIGNIFICANT CHANGE UP (ref 0–6)
ETHANOL SERPL-MCNC: 414 MG/DL — HIGH (ref 0–10)
GLUCOSE SERPL-MCNC: 89 MG/DL — SIGNIFICANT CHANGE UP (ref 70–99)
HCG SERPL-ACNC: <1 MIU/ML — SIGNIFICANT CHANGE UP
HCT VFR BLD CALC: 39.9 % — SIGNIFICANT CHANGE UP (ref 34.5–45)
HDLC SERPL-MCNC: 79 MG/DL — SIGNIFICANT CHANGE UP
HGB BLD-MCNC: 12.8 G/DL — SIGNIFICANT CHANGE UP (ref 11.5–15.5)
IMM GRANULOCYTES NFR BLD AUTO: 0.4 % — SIGNIFICANT CHANGE UP (ref 0–1.5)
LIPID PNL WITH DIRECT LDL SERPL: 139 MG/DL — SIGNIFICANT CHANGE UP
LITHIUM SERPL-MCNC: <0.2 MMOL/L — LOW (ref 0.6–1.2)
LYMPHOCYTES # BLD AUTO: 2.53 K/UL — SIGNIFICANT CHANGE UP (ref 1–3.3)
LYMPHOCYTES # BLD AUTO: 31.9 % — SIGNIFICANT CHANGE UP (ref 13–44)
MCHC RBC-ENTMCNC: 31.3 PG — SIGNIFICANT CHANGE UP (ref 27–34)
MCHC RBC-ENTMCNC: 32.1 GM/DL — SIGNIFICANT CHANGE UP (ref 32–36)
MCV RBC AUTO: 97.6 FL — SIGNIFICANT CHANGE UP (ref 80–100)
MONOCYTES # BLD AUTO: 0.97 K/UL — HIGH (ref 0–0.9)
MONOCYTES NFR BLD AUTO: 12.2 % — SIGNIFICANT CHANGE UP (ref 2–14)
NEUTROPHILS # BLD AUTO: 4.14 K/UL — SIGNIFICANT CHANGE UP (ref 1.8–7.4)
NEUTROPHILS NFR BLD AUTO: 52.3 % — SIGNIFICANT CHANGE UP (ref 43–77)
NRBC # BLD: 0 /100 WBCS — SIGNIFICANT CHANGE UP (ref 0–0)
PLATELET # BLD AUTO: 201 K/UL — SIGNIFICANT CHANGE UP (ref 150–400)
POTASSIUM SERPL-MCNC: 3.8 MMOL/L — SIGNIFICANT CHANGE UP (ref 3.5–5.3)
POTASSIUM SERPL-SCNC: 3.8 MMOL/L — SIGNIFICANT CHANGE UP (ref 3.5–5.3)
PROT SERPL-MCNC: 6.2 G/DL — SIGNIFICANT CHANGE UP (ref 6–8.3)
RBC # BLD: 4.09 M/UL — SIGNIFICANT CHANGE UP (ref 3.8–5.2)
RBC # FLD: 15.2 % — HIGH (ref 10.3–14.5)
SALICYLATES SERPL-MCNC: 3.4 MG/DL — SIGNIFICANT CHANGE UP (ref 2.8–20)
SARS-COV-2 RNA SPEC QL NAA+PROBE: SIGNIFICANT CHANGE UP
SODIUM SERPL-SCNC: 143 MMOL/L — SIGNIFICANT CHANGE UP (ref 135–145)
TOTAL CHOLESTEROL/HDL RATIO MEASUREMENT: 3 RATIO — LOW (ref 3.3–7.1)
TRIGL SERPL-MCNC: 94 MG/DL — SIGNIFICANT CHANGE UP (ref 10–149)
TSH SERPL-MCNC: 0.68 UU/ML — SIGNIFICANT CHANGE UP (ref 0.34–4.82)
VALPROATE SERPL-MCNC: <3 UG/ML — LOW (ref 50–100)
WBC # BLD: 7.92 K/UL — SIGNIFICANT CHANGE UP (ref 3.8–10.5)
WBC # FLD AUTO: 7.92 K/UL — SIGNIFICANT CHANGE UP (ref 3.8–10.5)

## 2020-09-23 PROCEDURE — 72125 CT NECK SPINE W/O DYE: CPT | Mod: 26

## 2020-09-23 PROCEDURE — 99223 1ST HOSP IP/OBS HIGH 75: CPT | Mod: GC

## 2020-09-23 PROCEDURE — 99285 EMERGENCY DEPT VISIT HI MDM: CPT

## 2020-09-23 PROCEDURE — 71045 X-RAY EXAM CHEST 1 VIEW: CPT | Mod: 26

## 2020-09-23 PROCEDURE — 70450 CT HEAD/BRAIN W/O DYE: CPT | Mod: 26

## 2020-09-23 PROCEDURE — 70486 CT MAXILLOFACIAL W/O DYE: CPT | Mod: 26

## 2020-09-23 RX ORDER — MIDAZOLAM HYDROCHLORIDE 1 MG/ML
2 INJECTION, SOLUTION INTRAMUSCULAR; INTRAVENOUS ONCE
Refills: 0 | Status: DISCONTINUED | OUTPATIENT
Start: 2020-09-23 | End: 2020-09-23

## 2020-09-23 RX ORDER — MULTIVIT-MIN/FERROUS GLUCONATE 9 MG/15 ML
1 LIQUID (ML) ORAL DAILY
Refills: 0 | Status: DISCONTINUED | OUTPATIENT
Start: 2020-09-23 | End: 2020-09-25

## 2020-09-23 RX ORDER — THIAMINE MONONITRATE (VIT B1) 100 MG
500 TABLET ORAL DAILY
Refills: 0 | Status: DISCONTINUED | OUTPATIENT
Start: 2020-09-23 | End: 2020-09-25

## 2020-09-23 RX ORDER — NALTREXONE HYDROCHLORIDE 50 MG/1
0 TABLET, FILM COATED ORAL
Qty: 0 | Refills: 1 | DISCHARGE

## 2020-09-23 RX ORDER — FOLIC ACID 0.8 MG
1 TABLET ORAL
Refills: 0 | Status: DISCONTINUED | OUTPATIENT
Start: 2020-09-23 | End: 2020-09-25

## 2020-09-23 RX ORDER — SODIUM CHLORIDE 9 MG/ML
1000 INJECTION, SOLUTION INTRAVENOUS
Refills: 0 | Status: DISCONTINUED | OUTPATIENT
Start: 2020-09-23 | End: 2020-09-24

## 2020-09-23 RX ORDER — ESCITALOPRAM OXALATE 10 MG/1
1 TABLET, FILM COATED ORAL
Qty: 0 | Refills: 0 | DISCHARGE

## 2020-09-23 RX ORDER — THIAMINE MONONITRATE (VIT B1) 100 MG
0 TABLET ORAL
Qty: 0 | Refills: 0 | DISCHARGE

## 2020-09-23 RX ORDER — FOLIC ACID 0.8 MG
0 TABLET ORAL
Qty: 0 | Refills: 1 | DISCHARGE

## 2020-09-23 RX ORDER — LAMOTRIGINE 25 MG/1
0 TABLET, ORALLY DISINTEGRATING ORAL
Qty: 0 | Refills: 0 | DISCHARGE

## 2020-09-23 RX ADMIN — Medication 1 TABLET(S): at 17:10

## 2020-09-23 RX ADMIN — Medication 2 MILLIGRAM(S): at 21:57

## 2020-09-23 RX ADMIN — MIDAZOLAM HYDROCHLORIDE 2 MILLIGRAM(S): 1 INJECTION, SOLUTION INTRAMUSCULAR; INTRAVENOUS at 12:24

## 2020-09-23 RX ADMIN — Medication 2 MILLIGRAM(S): at 12:24

## 2020-09-23 RX ADMIN — Medication 2 MILLIGRAM(S): at 08:29

## 2020-09-23 RX ADMIN — Medication 1 MILLIGRAM(S): at 22:15

## 2020-09-23 RX ADMIN — Medication 105 MILLIGRAM(S): at 17:10

## 2020-09-23 RX ADMIN — MIDAZOLAM HYDROCHLORIDE 2 MILLIGRAM(S): 1 INJECTION, SOLUTION INTRAMUSCULAR; INTRAVENOUS at 08:47

## 2020-09-23 RX ADMIN — Medication 2 MILLIGRAM(S): at 09:08

## 2020-09-23 NOTE — H&P ADULT - NSHPSOCIALHISTORY_GEN_ALL_CORE
Pt lives with her . 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  She is an active smoker. She has been smoking about a pack a day for more than 15 years. She stops drinking for 2 weeks then start drinking again, she has been drinking for 5days Tequila daily.

## 2020-09-23 NOTE — H&P ADULT - PROBLEM SELECTOR PLAN 1
p/w witnessed seizure  - Head CT : no acute findings   - f/u Lactate   - Seizures/Aspirations/Fall Precautions  - f/u EEG   - f/u CK level at AM   Neurology Dr. Dawson consulted p/w witnessed seizure last for a minute, however questionable  - Head CT : no acute findings   - f/u Lactate   - Seizures/Aspirations/Fall Precautions  - f/u CK level at AM   If pt has another seizure activity, will consult Neurology and do EEG as per attending Dr. Truong

## 2020-09-23 NOTE — ED ADULT NURSE NOTE - INTERVENTIONS DEFINITIONS
Stretcher in lowest position, wheels locked, appropriate side rails in place/Reinforce activity limits and safety measures with patient and family/Provide visual clues: red socks/Provide visual cue, wrist band, yellow gown, etc./Physically safe environment: no spills, clutter or unnecessary equipment

## 2020-09-23 NOTE — ED PROVIDER NOTE - OBJECTIVE STATEMENT
36 y/o F pt with a PMHx of EtOH abuse and EtOH withdrawal Sz and no significant PSHx BIB  to ED with reported sz this morning at 05:00. Pt states she was binging on EtOH and has been drinking a bottle of Vodka daily. Pt reports taking 4 Xanax "to help with the shakes" and has been eating food. Pt denies headache, abd pain, nausea, dizziness, or any other acute complaints. NKDA.

## 2020-09-23 NOTE — ED ADULT NURSE NOTE - ED STAT RN HANDOFF DETAILS
patient admitted to [facility] awaiting for bed availability, observed sleeping most of the time, no N/V respiratory distress noted. safety and comfort maintained.

## 2020-09-23 NOTE — ED PROVIDER NOTE - CLINICAL SUMMARY MEDICAL DECISION MAKING FREE TEXT BOX
36 y/o F pt Hx of chronic EtOH abuse withdrawal sz with suspected sz this morning. Pt currently at baseline but agitated. Will provide sedation, perform labs, imaging of head/face and reassess.

## 2020-09-23 NOTE — H&P ADULT - PROBLEM SELECTOR PLAN 2
pt had binge drink since last Fri 9/18, tequila daily  - ANABELLA protocol  - ativan PRN q2h  - f/u SW consult, but as per  pt is refusing inpatient rehab

## 2020-09-23 NOTE — H&P ADULT - PROBLEM SELECTOR PLAN 5
IMPROVE VTE Individual Risk Assessment  RISK                                                                Points  [  ] Previous VTE                                                  3  [  ] Thrombophilia                                               2  [  ] Lower limb paralysis                                      2        (unable to hold up >15 seconds)    [  ] Current Cancer                                              2         (within 6 months)  [x  ] Immobilization > 24 hrs                                1  [  ] ICU/CCU stay > 24 hours                              1  [  ] Age > 60                                                      1  IMPROVE VTE Score _____1____  No DVT ppx indecated

## 2020-09-23 NOTE — H&P ADULT - NSICDXPASTMEDICALHX_GEN_ALL_CORE_FT
PAST MEDICAL HISTORY:  Alcohol abuse     Alcohol withdrawal seizure     Depression     Post traumatic stress disorder (PTSD)

## 2020-09-23 NOTE — ED ADULT NURSE NOTE - OBJECTIVE STATEMENT
arrived ambulatory reports fall incident due to seizure x this morning sustained bruise to Rt face and eye noted, Patient admits drinking alcohol, last drink 7am today. yellow gown and room alert in place.

## 2020-09-23 NOTE — ED PROVIDER NOTE - CONSTITUTIONAL, MLM
normal... Agitated, awake, alert, oriented to person, place, time/situation and in no apparent distress.
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
## 2020-09-23 NOTE — ED ADULT TRIAGE NOTE - CHIEF COMPLAINT QUOTE
Per  she had a seizure from alcohol withdrawal this morning. Last drink 7am tequila and 8mg xanax. Ecchymosis noted to right eye and right face

## 2020-09-24 DIAGNOSIS — E55.9 VITAMIN D DEFICIENCY, UNSPECIFIED: ICD-10-CM

## 2020-09-24 LAB
24R-OH-CALCIDIOL SERPL-MCNC: 14.2 NG/ML — LOW (ref 30–80)
A1C WITH ESTIMATED AVERAGE GLUCOSE RESULT: 5.1 % — SIGNIFICANT CHANGE UP (ref 4–5.6)
ALBUMIN SERPL ELPH-MCNC: 2.6 G/DL — LOW (ref 3.5–5)
ALP SERPL-CCNC: 47 U/L — SIGNIFICANT CHANGE UP (ref 40–120)
ALT FLD-CCNC: 93 U/L DA — HIGH (ref 10–60)
ANION GAP SERPL CALC-SCNC: 6 MMOL/L — SIGNIFICANT CHANGE UP (ref 5–17)
APPEARANCE UR: CLEAR — SIGNIFICANT CHANGE UP
AST SERPL-CCNC: 44 U/L — HIGH (ref 10–40)
BASOPHILS # BLD AUTO: 0.04 K/UL — SIGNIFICANT CHANGE UP (ref 0–0.2)
BASOPHILS NFR BLD AUTO: 0.7 % — SIGNIFICANT CHANGE UP (ref 0–2)
BILIRUB SERPL-MCNC: 0.4 MG/DL — SIGNIFICANT CHANGE UP (ref 0.2–1.2)
BILIRUB UR-MCNC: NEGATIVE — SIGNIFICANT CHANGE UP
BUN SERPL-MCNC: 9 MG/DL — SIGNIFICANT CHANGE UP (ref 7–18)
CALCIUM SERPL-MCNC: 8.2 MG/DL — LOW (ref 8.4–10.5)
CHLORIDE SERPL-SCNC: 105 MMOL/L — SIGNIFICANT CHANGE UP (ref 96–108)
CO2 SERPL-SCNC: 27 MMOL/L — SIGNIFICANT CHANGE UP (ref 22–31)
COLOR SPEC: YELLOW — SIGNIFICANT CHANGE UP
CREAT SERPL-MCNC: 0.48 MG/DL — LOW (ref 0.5–1.3)
DIFF PNL FLD: NEGATIVE — SIGNIFICANT CHANGE UP
EOSINOPHIL # BLD AUTO: 0.12 K/UL — SIGNIFICANT CHANGE UP (ref 0–0.5)
EOSINOPHIL NFR BLD AUTO: 2 % — SIGNIFICANT CHANGE UP (ref 0–6)
ESTIMATED AVERAGE GLUCOSE: 100 MG/DL — SIGNIFICANT CHANGE UP (ref 68–114)
FOLATE SERPL-MCNC: 11.5 NG/ML — SIGNIFICANT CHANGE UP
GGT SERPL-CCNC: 63 U/L — HIGH (ref 8–40)
GLUCOSE SERPL-MCNC: 80 MG/DL — SIGNIFICANT CHANGE UP (ref 70–99)
GLUCOSE UR QL: NEGATIVE — SIGNIFICANT CHANGE UP
HCG UR QL: NEGATIVE — SIGNIFICANT CHANGE UP
HCT VFR BLD CALC: 37.9 % — SIGNIFICANT CHANGE UP (ref 34.5–45)
HGB BLD-MCNC: 12.6 G/DL — SIGNIFICANT CHANGE UP (ref 11.5–15.5)
HYPOCHROMIA BLD QL: SLIGHT — SIGNIFICANT CHANGE UP
IMM GRANULOCYTES NFR BLD AUTO: 0.7 % — SIGNIFICANT CHANGE UP (ref 0–1.5)
KETONES UR-MCNC: NEGATIVE — SIGNIFICANT CHANGE UP
LACTATE SERPL-SCNC: 1.1 MMOL/L — SIGNIFICANT CHANGE UP (ref 0.7–2)
LEUKOCYTE ESTERASE UR-ACNC: NEGATIVE — SIGNIFICANT CHANGE UP
LYMPHOCYTES # BLD AUTO: 1.9 K/UL — SIGNIFICANT CHANGE UP (ref 1–3.3)
LYMPHOCYTES # BLD AUTO: 32.1 % — SIGNIFICANT CHANGE UP (ref 13–44)
MAGNESIUM SERPL-MCNC: 1.8 MG/DL — SIGNIFICANT CHANGE UP (ref 1.6–2.6)
MANUAL SMEAR VERIFICATION: SIGNIFICANT CHANGE UP
MCHC RBC-ENTMCNC: 31.4 PG — SIGNIFICANT CHANGE UP (ref 27–34)
MCHC RBC-ENTMCNC: 33.2 GM/DL — SIGNIFICANT CHANGE UP (ref 32–36)
MCV RBC AUTO: 94.5 FL — SIGNIFICANT CHANGE UP (ref 80–100)
MONOCYTES # BLD AUTO: 1.01 K/UL — HIGH (ref 0–0.9)
MONOCYTES NFR BLD AUTO: 17.1 % — HIGH (ref 2–14)
NEUTROPHILS # BLD AUTO: 2.81 K/UL — SIGNIFICANT CHANGE UP (ref 1.8–7.4)
NEUTROPHILS NFR BLD AUTO: 47.4 % — SIGNIFICANT CHANGE UP (ref 43–77)
NITRITE UR-MCNC: NEGATIVE — SIGNIFICANT CHANGE UP
NRBC # BLD: 0 /100 WBCS — SIGNIFICANT CHANGE UP (ref 0–0)
PH UR: 7 — SIGNIFICANT CHANGE UP (ref 5–8)
PHOSPHATE SERPL-MCNC: 3.2 MG/DL — SIGNIFICANT CHANGE UP (ref 2.5–4.5)
PLAT MORPH BLD: NORMAL — SIGNIFICANT CHANGE UP
PLATELET # BLD AUTO: 185 K/UL — SIGNIFICANT CHANGE UP (ref 150–400)
PLATELET COUNT - ESTIMATE: NORMAL — SIGNIFICANT CHANGE UP
POTASSIUM SERPL-MCNC: 3.8 MMOL/L — SIGNIFICANT CHANGE UP (ref 3.5–5.3)
POTASSIUM SERPL-SCNC: 3.8 MMOL/L — SIGNIFICANT CHANGE UP (ref 3.5–5.3)
PROT SERPL-MCNC: 5.5 G/DL — LOW (ref 6–8.3)
PROT UR-MCNC: NEGATIVE — SIGNIFICANT CHANGE UP
RBC # BLD: 4.01 M/UL — SIGNIFICANT CHANGE UP (ref 3.8–5.2)
RBC # FLD: 14.4 % — SIGNIFICANT CHANGE UP (ref 10.3–14.5)
RBC BLD AUTO: ABNORMAL
SARS-COV-2 IGG SERPL QL IA: NEGATIVE — SIGNIFICANT CHANGE UP
SARS-COV-2 IGM SERPL IA-ACNC: 0.15 INDEX — SIGNIFICANT CHANGE UP
SODIUM SERPL-SCNC: 138 MMOL/L — SIGNIFICANT CHANGE UP (ref 135–145)
SP GR SPEC: 1.01 — SIGNIFICANT CHANGE UP (ref 1.01–1.02)
STOMATOCYTES BLD QL SMEAR: SLIGHT — SIGNIFICANT CHANGE UP
TSH SERPL-MCNC: 1.36 UU/ML — SIGNIFICANT CHANGE UP (ref 0.34–4.82)
UROBILINOGEN FLD QL: NEGATIVE — SIGNIFICANT CHANGE UP
VIT B12 SERPL-MCNC: 436 PG/ML — SIGNIFICANT CHANGE UP (ref 232–1245)
WBC # BLD: 5.92 K/UL — SIGNIFICANT CHANGE UP (ref 3.8–10.5)
WBC # FLD AUTO: 5.92 K/UL — SIGNIFICANT CHANGE UP (ref 3.8–10.5)

## 2020-09-24 PROCEDURE — 99232 SBSQ HOSP IP/OBS MODERATE 35: CPT | Mod: GC

## 2020-09-24 RX ORDER — NICOTINE POLACRILEX 2 MG
1 GUM BUCCAL DAILY
Refills: 0 | Status: DISCONTINUED | OUTPATIENT
Start: 2020-09-24 | End: 2020-09-25

## 2020-09-24 RX ORDER — ERGOCALCIFEROL 1.25 MG/1
50000 CAPSULE ORAL
Refills: 0 | Status: DISCONTINUED | OUTPATIENT
Start: 2020-09-24 | End: 2020-09-25

## 2020-09-24 RX ORDER — CITALOPRAM 10 MG/1
20 TABLET, FILM COATED ORAL DAILY
Refills: 0 | Status: DISCONTINUED | OUTPATIENT
Start: 2020-09-24 | End: 2020-09-25

## 2020-09-24 RX ADMIN — SODIUM CHLORIDE 75 MILLILITER(S): 9 INJECTION, SOLUTION INTRAVENOUS at 12:18

## 2020-09-24 RX ADMIN — SODIUM CHLORIDE 75 MILLILITER(S): 9 INJECTION, SOLUTION INTRAVENOUS at 01:11

## 2020-09-24 RX ADMIN — Medication 1 MILLIGRAM(S): at 17:56

## 2020-09-24 RX ADMIN — Medication 1 TABLET(S): at 13:29

## 2020-09-24 RX ADMIN — Medication 105 MILLIGRAM(S): at 12:13

## 2020-09-24 RX ADMIN — Medication 2 MILLIGRAM(S): at 01:34

## 2020-09-24 RX ADMIN — ERGOCALCIFEROL 50000 UNIT(S): 1.25 CAPSULE ORAL at 17:23

## 2020-09-24 RX ADMIN — CITALOPRAM 20 MILLIGRAM(S): 10 TABLET, FILM COATED ORAL at 12:13

## 2020-09-24 RX ADMIN — Medication 1 MILLIGRAM(S): at 06:58

## 2020-09-24 NOTE — PROGRESS NOTE ADULT - PROBLEM SELECTOR PLAN 3
c/w citalopram 20mg daily (home med) C/w citalopram 20mg daily (home med) - Patient with low vitamin D, 14.2  - Started on Vit D 50,000 units po weekly

## 2020-09-24 NOTE — PROGRESS NOTE ADULT - ATTENDING COMMENTS
Patient seen and examined with PGY1 and MS3/4 this morning; Agree with PGY1 A/P above with editing as needed. My independent assessment, findings on exam, diagnosis and plan of care as listed below. Discussed with Dr. Thorne    34 y/o F from home, lives with , has PMHx of chronic alcohol abuse, depression is admitted to medicine for alcohol intoxication and seizures.  Patient denies having seizure and upset that her  keeps calling Ambulance. She says works Monday to Friday     Vital Signs Last 24 Hrs  T(C): 36.9 (24 Sep 2020 16:44), Max: 37.2 (24 Sep 2020 04:50)  T(F): 98.4 (24 Sep 2020 16:44), Max: 99 (24 Sep 2020 04:50)  HR: 63 (24 Sep 2020 16:44) (63 - 99)  BP: 154/91 (24 Sep 2020 16:44) (125/75 - 160/96)  RR: 16 (24 Sep 2020 16:44) (16 - 20)  SpO2: 99% (24 Sep 2020 16:44) (93% - 100%)    P/E: As above  young obese female in no acute distress  Neuro: AAO x3; No focal deficits; No tremulousness noted  CVS: S1S2 present, regular  Resp: BLAE+, No wheeze or Rhonchi  GI: Soft, Obese, BS+, NT  Extr: No edema or calf tenderness    Labs:                        12.6   5.92  )-----------( 185      ( 24 Sep 2020 06:38 )             37.9   09-24    138  |  105  |  9   ----------------------------<  80  3.8   |  27  |  0.48<L>    Ca    8.2<L>      24 Sep 2020 06:38  Phos  3.2     09-24  Mg     1.8     09-24    TPro  5.5<L>  /  Alb  2.6<L>  /  TBili  0.4  /  DBili  x   /  AST  44<H>  /  ALT  93<H>  /  AlkPhos  47  09-24    D/D:  Acute Alcohol intoxication  unlikely Seizure activity  Chronic alcohol dependance

## 2020-09-24 NOTE — PROGRESS NOTE ADULT - PROBLEM SELECTOR PLAN 4
AST/ALT 66/131 - not likely alcoholic liver injury  Monitor CMP daily AST/ALT 66/131 - not likely alcoholic liver injury  Trending down 44/93  Monitor CMP daily C/w citalopram 20mg daily (home med)

## 2020-09-24 NOTE — PROGRESS NOTE ADULT - SUBJECTIVE AND OBJECTIVE BOX
PGY-1 Progress Note discussed with attending    PAGER #: [[phone]] TILL 5:00 PM  PLEASE CONTACT ON CALL TEAM:  - On Call Team (Please refer to Justina) FROM 5:00 PM - 8:30PM  - Nightfloat Team FROM 8:30 -7:30 AM    CHIEF COMPLAINT & BRIEF HOSPITAL COURSE:  36 y/o F from home, lives with , has PMHx of chronic alcohol abuse (multiple admissions for alcohol intoxication), alcohol withdrawal seizure, Depression, PTSD. Patient was BIB  to ED after one episode of seizure.  As per , her eyes rolled up, started shaking and stopped within a minute, no urinary or fecal incontinence. Patient was recently discharged from [facility] 2 weeks ago and started drinking tequila daily since last Fri 9/18. As per ED provider note, pt reports taking 4 Xanax "to help with the shakes".  Pt denies headache, abdominal pain, nausea, dizziness, or any other acute complaints.     In ED pt was given ativan and versed.  Patient was admitted to medicine for alcohol intoxication and seizures.     INTERVAL HPI/OVERNIGHT EVENTS: patient refers she is feeling fine, denies pain or discomfort. Patient also refers she wants to go home.       REVIEW OF SYSTEMS:  CONSTITUTIONAL: No fever, weight loss, or fatigue  RESPIRATORY: No cough, wheezing, chills or hemoptysis; No shortness of breath  CARDIOVASCULAR: No chest pain, palpitations, dizziness, or leg swelling  GASTROINTESTINAL: No abdominal pain. No nausea, vomiting, or hematemesis; No diarrhea or constipation. No melena or hematochezia.  GENITOURINARY: No dysuria or hematuria, urinary frequency  NEUROLOGICAL: No headaches, memory loss, loss of strength, numbness, or tremors  SKIN: No itching, burning, rashes, or lesions     MEDICATIONS  (STANDING):  citalopram 20 milliGRAM(s) Oral daily  dextrose 5% + lactated ringers. 1000 milliLiter(s) (75 mL/Hr) IV Continuous <Continuous>  folic acid Injectable 1 milliGRAM(s) IV Push two times a day  LORazepam   Injectable 2 milliGRAM(s) IV Push every 4 hours  multivitamin/minerals 1 Tablet(s) Oral daily  nicotine -   7 mG/24Hr(s) Patch 1 patch Transdermal daily  thiamine IVPB 500 milliGRAM(s) IV Intermittent daily    MEDICATIONS  (PRN):  LORazepam   Injectable 2 milliGRAM(s) IV Push every 2 hours PRN CIWA >7      Vital Signs Last 24 Hrs  T(C): 37.2 (24 Sep 2020 04:50), Max: 37.3 (23 Sep 2020 15:30)  T(F): 99 (24 Sep 2020 04:50), Max: 99.2 (23 Sep 2020 15:30)  HR: 77 (24 Sep 2020 07:07) (76 - 109)  BP: 144/86 (24 Sep 2020 04:50) (116/76 - 144/86)  BP(mean): --  RR: 16 (24 Sep 2020 07:07) (16 - 20)  SpO2: 100% (24 Sep 2020 07:07) (93% - 100%)    PHYSICAL EXAMINATION:  GENERAL: NAD, well built  HEAD:  Atraumatic, Normocephalic  EYES:  conjunctiva and sclera clear  NECK: Supple, No JVD, Normal thyroid  CHEST/LUNG: Clear to auscultation. Clear to percussion bilaterally; No rales, rhonchi, wheezing, or rubs  HEART: Regular rate and rhythm; No murmurs, rubs, or gallops  ABDOMEN: Soft, Nontender, Nondistended; Bowel sounds present  NERVOUS SYSTEM:  Alert & Oriented X3,    EXTREMITIES:  2+ Peripheral Pulses, No clubbing, cyanosis, or edema  SKIN: warm dry                          12.6   5.92  )-----------( 185      ( 24 Sep 2020 06:38 )             37.9     09-24    138  |  105  |  9   ----------------------------<  80  3.8   |  27  |  0.48<L>    Ca    8.2<L>      24 Sep 2020 06:38  Phos  3.2     09-24  Mg     1.8     09-24    TPro  5.5<L>  /  Alb  2.6<L>  /  TBili  0.4  /  DBili  x   /  AST  44<H>  /  ALT  93<H>  /  AlkPhos  47  09-24    LIVER FUNCTIONS - ( 24 Sep 2020 10:16 )  Alb: x     / Pro: x     / ALK PHOS: x     / ALT: x     / AST: x     / GGT: 63 U/L         I&O's Summary                       PGY-1 Progress Note discussed with attending    PAGER #: [[phone]] TILL 5:00 PM  PLEASE CONTACT ON CALL TEAM:  - On Call Team (Please refer to Justina) FROM 5:00 PM - 8:30PM  - Nightfloat Team FROM 8:30 -7:30 AM    CHIEF COMPLAINT & BRIEF HOSPITAL COURSE:  36 y/o F from home, lives with , has PMHx of chronic alcohol abuse (multiple admissions for alcohol intoxication), alcohol withdrawal seizure, Depression, PTSD. Patient was BIB  to ED after one episode of seizure.  As per , her eyes rolled up, started shaking and stopped within a minute, no urinary or fecal incontinence. Patient was recently discharged from [facility] 2 weeks ago and started drinking tequila daily since last Fri 9/18. As per ED provider note, pt reports taking 4 Xanax "to help with the shakes".  Pt denies headache, abdominal pain, nausea, dizziness, or any other acute complaints.     In ED pt was given ativan and versed.  Patient was admitted to medicine for alcohol intoxication and seizures. It is unclear if it is a seizure, pat had no post ictal. Neuro Dr Dawson consulted.   Patient continues on CIWA protocol, on ativan prn and standing.     INTERVAL HPI/OVERNIGHT EVENTS: patient refers she is feeling fine, denies pain or discomfort. Patient also refers she wants to go home.       REVIEW OF SYSTEMS:  CONSTITUTIONAL: No fever, weight loss, or fatigue  RESPIRATORY: No cough, wheezing, chills or hemoptysis; No shortness of breath  CARDIOVASCULAR: No chest pain, palpitations, dizziness, or leg swelling  GASTROINTESTINAL: No abdominal pain. No nausea, vomiting, or hematemesis; No diarrhea or constipation. No melena or hematochezia.  GENITOURINARY: No dysuria or hematuria, urinary frequency  NEUROLOGICAL: No headaches, memory loss, loss of strength, numbness, or tremors  SKIN: No itching, burning, rashes, or lesions     MEDICATIONS  (STANDING):  citalopram 20 milliGRAM(s) Oral daily  dextrose 5% + lactated ringers. 1000 milliLiter(s) (75 mL/Hr) IV Continuous <Continuous>  folic acid Injectable 1 milliGRAM(s) IV Push two times a day  LORazepam   Injectable 2 milliGRAM(s) IV Push every 4 hours  multivitamin/minerals 1 Tablet(s) Oral daily  nicotine -   7 mG/24Hr(s) Patch 1 patch Transdermal daily  thiamine IVPB 500 milliGRAM(s) IV Intermittent daily    MEDICATIONS  (PRN):  LORazepam   Injectable 2 milliGRAM(s) IV Push every 2 hours PRN CIWA >7      Vital Signs Last 24 Hrs  T(C): 37.2 (24 Sep 2020 04:50), Max: 37.3 (23 Sep 2020 15:30)  T(F): 99 (24 Sep 2020 04:50), Max: 99.2 (23 Sep 2020 15:30)  HR: 77 (24 Sep 2020 07:07) (76 - 109)  BP: 144/86 (24 Sep 2020 04:50) (116/76 - 144/86)  BP(mean): --  RR: 16 (24 Sep 2020 07:07) (16 - 20)  SpO2: 100% (24 Sep 2020 07:07) (93% - 100%)    PHYSICAL EXAMINATION:  GENERAL: NAD, overweight  HEAD:  Atraumatic, Normocephalic  EYES:  conjunctiva and sclera clear, bruise on right eye   NECK: Supple, No JVD, Normal thyroid  CHEST/LUNG: Clear to auscultation. Clear to percussion bilaterally; No rales, rhonchi, wheezing, or rubs  HEART: Regular rate and rhythm; No murmurs, rubs, or gallops  ABDOMEN: Abundant fat tissue, Soft, Nontender, Nondistended; Bowel sounds present; no pain or masses on palpation   NERVOUS SYSTEM:  Alert & Oriented X3, no tremors     EXTREMITIES:  2+ Peripheral Pulses, No clubbing, cyanosis, or edema  SKIN: warm dry                           12.6   5.92  )-----------( 185      ( 24 Sep 2020 06:38 )             37.9     09-24    138  |  105  |  9   ----------------------------<  80  3.8   |  27  |  0.48<L>    Ca    8.2<L>      24 Sep 2020 06:38  Phos  3.2     09-24  Mg     1.8     09-24    TPro  5.5<L>  /  Alb  2.6<L>  /  TBili  0.4  /  DBili  x   /  AST  44<H>  /  ALT  93<H>  /  AlkPhos  47  09-24    LIVER FUNCTIONS - ( 24 Sep 2020 10:16 )  Alb: x     / Pro: x     / ALK PHOS: x     / ALT: x     / AST: x     / GGT: 63 U/L         I&O's Summary

## 2020-09-24 NOTE — PROGRESS NOTE ADULT - PROBLEM SELECTOR PLAN 2
Pt had binge drinking since last Fri 9/18, tequila daily  - [facility]NABELLA protocol  - Ativan PRN q2h and q4hrs standing  - C/w thiamine, folic acid, MV   - SW consulted, as per  pt is refusing inpatient rehab - Pt with daily binge drinking tequila since last Fri 9/18  - CIWA protocol  - Ativan PRN q2h and q4hrs standing  - C/w thiamine, folic acid, MV   - SW consulted, as per  pt is refusing inpatient rehab - Pt with daily binge drinking tequila since last Fri 9/18  - [facility] protocol  - Ativan PRN q2hrs and q6hrs standing  - C/w thiamine, folic acid, MV   - SW consulted, as per  pt is refusing inpatient rehab. 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

## 2020-09-24 NOTE — PROGRESS NOTE ADULT - PROBLEM SELECTOR PLAN 6
IMPROVE VTE Individual Risk Assessment  RISK                                                                Points  [  ] Previous VTE                                                  3  [  ] Thrombophilia                                               2  [  ] Lower limb paralysis                                      2        (unable to hold up >15 seconds)    [  ] Current Cancer                                              2         (within 6 months)  [x  ] Immobilization > 24 hrs                                1  [  ] ICU/CCU stay > 24 hours                              1  [  ] Age > 60                                                      1  IMPROVE VTE Score _____1____  No DVT ppx indicated

## 2020-09-24 NOTE — PROGRESS NOTE ADULT - ASSESSMENT
34 y/o F from home, lives with , has PMHx of chronic alcohol abuse, depression is admitted to medicine for alcohol withdrawal and seizure 36 y/o F from home, lives with , has PMHx of chronic alcohol abuse, depression is admitted to medicine for alcohol intoxication and seizures

## 2020-09-24 NOTE — PROGRESS NOTE ADULT - PROBLEM SELECTOR PLAN 1
- P/w witnessed seizure that last for a minute ?  - Head CT : no acute findings   - Lactate ***  - Seizures/Aspirations/Fall Precautions  - CK ****  - UA negative  - Drug toxicology negative except Alcohol 414  **If pt has seizure activity, will consult Neurology and do EEG as per attending Dr. Truong - P/w witnessed seizure ? that last for a minute ?  - Unclear if it is a seizure since no post ictal was mentioned   - Head CT : no acute findings   - Lactate 1.1  - Seizures/Aspirations/Fall Precautions  - F/u CK   - UA negative  - Drug toxicology negative except Alcohol 414  - Neuro Dr Dawson consulted - P/w witnessed seizure ? that last for a minute ?  - Unclear if it is a seizure since no post ictal was mentioned   - Head CT : no acute findings   - Lactate 1.1  - Seizures/Aspirations/Fall Precautions  - UA negative  - Drug toxicology negative except Alcohol 414  - Neuro Dr Dawson consulted

## 2020-09-25 VITALS
TEMPERATURE: 98 F | OXYGEN SATURATION: 98 % | DIASTOLIC BLOOD PRESSURE: 81 MMHG | HEART RATE: 69 BPM | SYSTOLIC BLOOD PRESSURE: 138 MMHG | RESPIRATION RATE: 18 BRPM

## 2020-09-25 LAB
ALBUMIN SERPL ELPH-MCNC: 3 G/DL — LOW (ref 3.5–5)
ALP SERPL-CCNC: 64 U/L — SIGNIFICANT CHANGE UP (ref 40–120)
ALT FLD-CCNC: 166 U/L DA — HIGH (ref 10–60)
ANION GAP SERPL CALC-SCNC: 5 MMOL/L — SIGNIFICANT CHANGE UP (ref 5–17)
AST SERPL-CCNC: 122 U/L — HIGH (ref 10–40)
BASOPHILS # BLD AUTO: 0.03 K/UL — SIGNIFICANT CHANGE UP (ref 0–0.2)
BASOPHILS NFR BLD AUTO: 0.4 % — SIGNIFICANT CHANGE UP (ref 0–2)
BILIRUB SERPL-MCNC: 0.3 MG/DL — SIGNIFICANT CHANGE UP (ref 0.2–1.2)
BUN SERPL-MCNC: 7 MG/DL — SIGNIFICANT CHANGE UP (ref 7–18)
CALCIUM SERPL-MCNC: 8.7 MG/DL — SIGNIFICANT CHANGE UP (ref 8.4–10.5)
CHLORIDE SERPL-SCNC: 105 MMOL/L — SIGNIFICANT CHANGE UP (ref 96–108)
CO2 SERPL-SCNC: 26 MMOL/L — SIGNIFICANT CHANGE UP (ref 22–31)
CREAT SERPL-MCNC: 0.54 MG/DL — SIGNIFICANT CHANGE UP (ref 0.5–1.3)
EOSINOPHIL # BLD AUTO: 0.14 K/UL — SIGNIFICANT CHANGE UP (ref 0–0.5)
EOSINOPHIL NFR BLD AUTO: 2.1 % — SIGNIFICANT CHANGE UP (ref 0–6)
GLUCOSE SERPL-MCNC: 85 MG/DL — SIGNIFICANT CHANGE UP (ref 70–99)
HCT VFR BLD CALC: 40.5 % — SIGNIFICANT CHANGE UP (ref 34.5–45)
HGB BLD-MCNC: 14.1 G/DL — SIGNIFICANT CHANGE UP (ref 11.5–15.5)
IMM GRANULOCYTES NFR BLD AUTO: 0.3 % — SIGNIFICANT CHANGE UP (ref 0–1.5)
LYMPHOCYTES # BLD AUTO: 1.52 K/UL — SIGNIFICANT CHANGE UP (ref 1–3.3)
LYMPHOCYTES # BLD AUTO: 22.6 % — SIGNIFICANT CHANGE UP (ref 13–44)
MAGNESIUM SERPL-MCNC: 2.2 MG/DL — SIGNIFICANT CHANGE UP (ref 1.6–2.6)
MCHC RBC-ENTMCNC: 32.4 PG — SIGNIFICANT CHANGE UP (ref 27–34)
MCHC RBC-ENTMCNC: 34.8 GM/DL — SIGNIFICANT CHANGE UP (ref 32–36)
MCV RBC AUTO: 93.1 FL — SIGNIFICANT CHANGE UP (ref 80–100)
MONOCYTES # BLD AUTO: 0.82 K/UL — SIGNIFICANT CHANGE UP (ref 0–0.9)
MONOCYTES NFR BLD AUTO: 12.2 % — SIGNIFICANT CHANGE UP (ref 2–14)
NEUTROPHILS # BLD AUTO: 4.2 K/UL — SIGNIFICANT CHANGE UP (ref 1.8–7.4)
NEUTROPHILS NFR BLD AUTO: 62.4 % — SIGNIFICANT CHANGE UP (ref 43–77)
NRBC # BLD: 0 /100 WBCS — SIGNIFICANT CHANGE UP (ref 0–0)
PHOSPHATE SERPL-MCNC: 4.4 MG/DL — SIGNIFICANT CHANGE UP (ref 2.5–4.5)
PLATELET # BLD AUTO: 214 K/UL — SIGNIFICANT CHANGE UP (ref 150–400)
POTASSIUM SERPL-MCNC: 3.9 MMOL/L — SIGNIFICANT CHANGE UP (ref 3.5–5.3)
POTASSIUM SERPL-SCNC: 3.9 MMOL/L — SIGNIFICANT CHANGE UP (ref 3.5–5.3)
PROT SERPL-MCNC: 6.9 G/DL — SIGNIFICANT CHANGE UP (ref 6–8.3)
RBC # BLD: 4.35 M/UL — SIGNIFICANT CHANGE UP (ref 3.8–5.2)
RBC # FLD: 14.1 % — SIGNIFICANT CHANGE UP (ref 10.3–14.5)
SODIUM SERPL-SCNC: 136 MMOL/L — SIGNIFICANT CHANGE UP (ref 135–145)
WBC # BLD: 6.73 K/UL — SIGNIFICANT CHANGE UP (ref 3.8–10.5)
WBC # FLD AUTO: 6.73 K/UL — SIGNIFICANT CHANGE UP (ref 3.8–10.5)

## 2020-09-25 PROCEDURE — 83036 HEMOGLOBIN GLYCOSYLATED A1C: CPT

## 2020-09-25 PROCEDURE — 84100 ASSAY OF PHOSPHORUS: CPT

## 2020-09-25 PROCEDURE — 84443 ASSAY THYROID STIM HORMONE: CPT

## 2020-09-25 PROCEDURE — 71045 X-RAY EXAM CHEST 1 VIEW: CPT

## 2020-09-25 PROCEDURE — 96372 THER/PROPH/DIAG INJ SC/IM: CPT | Mod: XU

## 2020-09-25 PROCEDURE — 82977 ASSAY OF GGT: CPT

## 2020-09-25 PROCEDURE — 99285 EMERGENCY DEPT VISIT HI MDM: CPT

## 2020-09-25 PROCEDURE — 83605 ASSAY OF LACTIC ACID: CPT

## 2020-09-25 PROCEDURE — 82746 ASSAY OF FOLIC ACID SERUM: CPT

## 2020-09-25 PROCEDURE — 80164 ASSAY DIPROPYLACETIC ACD TOT: CPT

## 2020-09-25 PROCEDURE — 70450 CT HEAD/BRAIN W/O DYE: CPT

## 2020-09-25 PROCEDURE — 82962 GLUCOSE BLOOD TEST: CPT

## 2020-09-25 PROCEDURE — 80156 ASSAY CARBAMAZEPINE TOTAL: CPT

## 2020-09-25 PROCEDURE — 99232 SBSQ HOSP IP/OBS MODERATE 35: CPT

## 2020-09-25 PROCEDURE — 72125 CT NECK SPINE W/O DYE: CPT

## 2020-09-25 PROCEDURE — 80053 COMPREHEN METABOLIC PANEL: CPT

## 2020-09-25 PROCEDURE — 70486 CT MAXILLOFACIAL W/O DYE: CPT

## 2020-09-25 PROCEDURE — 82248 BILIRUBIN DIRECT: CPT

## 2020-09-25 PROCEDURE — 80307 DRUG TEST PRSMV CHEM ANLYZR: CPT

## 2020-09-25 PROCEDURE — 99238 HOSP IP/OBS DSCHRG MGMT 30/<: CPT | Mod: GC

## 2020-09-25 PROCEDURE — 82009 KETONE BODYS QUAL: CPT

## 2020-09-25 PROCEDURE — 85025 COMPLETE CBC W/AUTO DIFF WBC: CPT

## 2020-09-25 PROCEDURE — 96374 THER/PROPH/DIAG INJ IV PUSH: CPT

## 2020-09-25 PROCEDURE — 81003 URINALYSIS AUTO W/O SCOPE: CPT

## 2020-09-25 PROCEDURE — 36415 COLL VENOUS BLD VENIPUNCTURE: CPT

## 2020-09-25 PROCEDURE — 86769 SARS-COV-2 COVID-19 ANTIBODY: CPT

## 2020-09-25 PROCEDURE — 82306 VITAMIN D 25 HYDROXY: CPT

## 2020-09-25 PROCEDURE — 80178 ASSAY OF LITHIUM: CPT

## 2020-09-25 PROCEDURE — 83735 ASSAY OF MAGNESIUM: CPT

## 2020-09-25 PROCEDURE — 82607 VITAMIN B-12: CPT

## 2020-09-25 PROCEDURE — 93005 ELECTROCARDIOGRAM TRACING: CPT

## 2020-09-25 PROCEDURE — 80061 LIPID PANEL: CPT

## 2020-09-25 PROCEDURE — 87635 SARS-COV-2 COVID-19 AMP PRB: CPT

## 2020-09-25 PROCEDURE — 81025 URINE PREGNANCY TEST: CPT

## 2020-09-25 PROCEDURE — 84702 CHORIONIC GONADOTROPIN TEST: CPT

## 2020-09-25 RX ORDER — ERGOCALCIFEROL 1.25 MG/1
1 CAPSULE ORAL
Qty: 7 | Refills: 0
Start: 2020-09-25 | End: 2020-11-12

## 2020-09-25 RX ADMIN — Medication 1 TABLET(S): at 11:18

## 2020-09-25 RX ADMIN — CITALOPRAM 20 MILLIGRAM(S): 10 TABLET, FILM COATED ORAL at 11:18

## 2020-09-25 RX ADMIN — Medication 105 MILLIGRAM(S): at 11:31

## 2020-09-25 RX ADMIN — Medication 1 MILLIGRAM(S): at 05:25

## 2020-09-25 NOTE — DISCHARGE NOTE PROVIDER - NSDCMRMEDTOKEN_GEN_ALL_CORE_FT
CITALOPRAM HBR 20 MG TABLET: TAKE 1 TABLET BY MOUTH EVERY DAY  folic acid 1 mg oral tablet: 1 tab(s) orally once a day  Multiple Vitamins oral tablet: 1 tab(s) orally once a day  thiamine 100 mg oral tablet: 1 tab(s) orally once a day   CITALOPRAM HBR 20 MG TABLET: TAKE 1 TABLET BY MOUTH EVERY DAY  ergocalciferol 50,000 intl units (1.25 mg) oral capsule: 1 cap(s) orally once a week every thursday   folic acid 1 mg oral tablet: 1 tab(s) orally once a day  Multiple Vitamins oral tablet: 1 tab(s) orally once a day  thiamine 100 mg oral tablet: 1 tab(s) orally once a day
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
## 2020-09-25 NOTE — DISCHARGE NOTE NURSING/CASE MANAGEMENT/SOCIAL WORK - PATIENT PORTAL LINK FT
You can access the FollowMyHealth Patient Portal offered by [facility] by registering at the following website: [email or web address]. By joining [employer]’s FollowMyHealth portal, you will also be able to view your health information using other applications (apps) compatible with our system.

## 2020-09-25 NOTE — DISCHARGE NOTE PROVIDER - NSDCCPCAREPLAN_GEN_ALL_CORE_FT
PRINCIPAL DISCHARGE DIAGNOSIS  Diagnosis: Alcohol intoxication  Assessment and Plan of Treatment: You came to the hospital after an Seizure episode. CT scan of the head didnt showed any abnormality. Your Blood alcohol level was > 400 on admission.   Your seizure episode was likely from alcohol abuse. You had the similar presentation last year . At that time EEG of the brain was negative for any seizure activity . You were treated with IV ativan, IV fluids, high dose thiamine on this hospitalisation.   - You didnt has any more seizure episiodes.   - You are again strongly advised to Stop alcohol use.   - No fdriving till seizure free for 1 year as per [facility] law   - Continue taking the Thiamine , folic acid and multivitamins at home         SECONDARY DISCHARGE DIAGNOSES  Diagnosis: Vitamin D deficiency  Assessment and Plan of Treatment: Your Vitamin D level is 14   Continue with Ergocalciferol 97553 U once a week for 7 weeks,then get your Vitamin D rechecked. Then continue with daily 1000 U over the counter as maintainence dose.    Diagnosis: Depression  Assessment and Plan of Treatment: Continue with your home medication     PRINCIPAL DISCHARGE DIAGNOSIS  Diagnosis: Alcohol intoxication  Assessment and Plan of Treatment: You came to the hospital brought in for suspected Seizure episode by your . CT scan of the head didnt showed any abnormality. Your Blood alcohol level was > 400 on admission.   It appears that you likely passed out from alcohol intoxication and less likely withdrawal seizures. You had the similar presentation last year . At that time EEG of the brain was negative for any seizure activity . You were treated with Intravenous Ativan, IV fluids, high dose thiamine on this hospitalisation.   - You didnt has any more seizure episiodes.   - You are again strongly advised to Stop alcohol use.   - No fdriving till seizure free for 1 year as per [facility] law   - Continue taking the Thiamine , folic acid and multivitamins at home         SECONDARY DISCHARGE DIAGNOSES  Diagnosis: Depression  Assessment and Plan of Treatment: Continue with your home medication    Diagnosis: Vitamin D deficiency  Assessment and Plan of Treatment: Your Vitamin D level is 14   Continue with Ergocalciferol 34490 U once a week for 7 weeks,then get your Vitamin D rechecked. Then continue with daily 1000 U over the counter as maintainence dose.     PRINCIPAL DISCHARGE DIAGNOSIS  Diagnosis: Alcohol intoxication  Assessment and Plan of Treatment: You came to the hospital brought in for suspected Seizure episode by your . CT scan of the head didnt showed any abnormality. Your Blood alcohol level was > 400 on admission.   It appears that you likely passed out from alcohol intoxication and less likely withdrawal seizures. You had the similar presentation last year . At that time EEG of the brain was negative for any seizure activity . You were treated with Intravenous Ativan, IV fluids, high dose thiamine on this hospitalisation.   - You didnt has any more seizure episiodes.   - You are again strongly advised to Stop alcohol use.   - No driving while intoxicated  - Continue taking the Thiamine , folic acid and multivitamins at home         SECONDARY DISCHARGE DIAGNOSES  Diagnosis: Depression  Assessment and Plan of Treatment: Continue with your home medication Celexa daily as before. Follow up wih your Psychiatrist as outpatient.    Diagnosis: Vitamin D deficiency  Assessment and Plan of Treatment: Your Vitamin D level is 14 which is significantly low.   Continue with Ergocalciferol 59731 Units once a week for 7 weeks, then get your Vitamin D level (25 hydroxy) rechecked. Once levels more than 30, may be switched to  daily 1000 U over the counter as maintainence dose by your PCP.     PRINCIPAL DISCHARGE DIAGNOSIS  Diagnosis: Alcohol intoxication  Assessment and Plan of Treatment: You came to the hospital brought in for suspected Seizure episode by your . CT scan of the head didnt showed any abnormality. Your Blood alcohol level was > 400 on admission.   It appears that you likely passed out from alcohol intoxication and less likely withdrawal seizures. You had the similar presentation last year . At that time EEG of the brain was negative for any seizure activity . You were treated with Intravenous Ativan, IV fluids, high dose thiamine on this hospitalisation.   - You didnt has any more seizure episiodes.   - You are again strongly advised to Stop alcohol use.   - No driving while intoxicated  - Continue taking the Thiamine , folic acid and multivitamins at home         SECONDARY DISCHARGE DIAGNOSES  Diagnosis: Chronic alcohol dependence, continuous  Assessment and Plan of Treatment:     Diagnosis: Transaminitis  Assessment and Plan of Treatment: You was noted to have elevated liver enzymes due to chronic alcohol use. You was counseled on cessation of alcohol use chronically.    Diagnosis: Depression  Assessment and Plan of Treatment: Continue with your home medication Celexa daily as before. Follow up wih your Psychiatrist as outpatient.    Diagnosis: Vitamin D deficiency  Assessment and Plan of Treatment: Your Vitamin D level is 14 which is significantly low.   Continue with Ergocalciferol 63297 Units once a week for 7 weeks, then get your Vitamin D level (25 hydroxy) rechecked. Once levels more than 30, may be switched to  daily 1000 U over the counter as maintainence dose by your PCP.

## 2020-09-25 NOTE — CONSULT NOTE ADULT - ASSESSMENT
Repeat admission for EtOH intoxication/withdrawal and reported (out of hospital) withdrawal seizure.     Now sober and out of withdrawal state.    No further neurologic studies indicated at this time.    No indication for anticonvulsant medication.

## 2020-09-25 NOTE — SBIRT NOTE ADULT - NSSBIRTBRIEFINTDET_GEN_A_CORE
Education provided around substance usage.  Patient reported that she is currently seeing a psychotherapist and psychiatrist.

## 2020-09-25 NOTE — DISCHARGE NOTE PROVIDER - HOSPITAL COURSE
34 y/o F from home, lives with , has PMHx of chronic alcohol abuse (multiple admissions for alcohol intoxication), alcohol withdrawal seizure, Depression, PTSD. Patient was BIB  to ED after one episode of seizure.  As per , her eyes rolled up, started shaking and stopped within a minute, no urinary or fecal incontinence. Patient was recently discharged from [facility] 2 weeks ago and started drinking tequila daily since last Fri 9/18. As per ED provider note, pt reports taking 4 Xanax "to help with the shakes".  Pt denies headache, abdominal pain, nausea, dizziness, or any other acute complaints.     In ED pt was given ativan and versed.  Patient was admitted to medicine for alcohol intoxication and seizures. It is unclear if it is a seizure, pat had no post ictal. Neuro Dr Dawson consulted.   Patient has not used any prn ativan in last 24 hrs .   Pt is currently stable and wants to go home   - 36 y/o F from home, lives with , has PMHx of chronic alcohol abuse (multiple admissions for alcohol intoxication), alcohol withdrawal seizure, Depression, PTSD. Patient was BIB  to ED after one episode of seizure.  As per , her eyes rolled up, started shaking and stopped within a minute, no urinary or fecal incontinence. Patient was recently discharged from [facility] 2 weeks ago and started drinking tequila daily since last Fri 9/18. As per ED provider note, pt reports taking 4 Xanax "to help with the shakes".  Pt denies headache, abdominal pain, nausea, dizziness, or any other acute complaints.     In ED pt was given ativan and versed.  Patient was admitted to medicine for alcohol intoxication and seizures. It is unclear if it is a seizure, pat had no post ictal. Neuro Dr Dawson consulted.   Patient has not used any prn ativan in last 24 hrs .   Pt is currently stable and wants to go home     Pt stable for discharge as per attending   -

## 2020-10-20 ENCOUNTER — INPATIENT (INPATIENT)
Facility: HOSPITAL | Age: 35
LOS: 1 days | Discharge: HOME | DRG: 897 | End: 2020-10-22
Attending: HOSPITALIST | Admitting: HOSPITALIST
Payer: MEDICAID

## 2020-10-20 VITALS
WEIGHT: 179.9 LBS | HEART RATE: 142 BPM | DIASTOLIC BLOOD PRESSURE: 84 MMHG | SYSTOLIC BLOOD PRESSURE: 129 MMHG | OXYGEN SATURATION: 93 % | RESPIRATION RATE: 20 BRPM | HEIGHT: 65 IN

## 2020-10-20 LAB
ALBUMIN SERPL ELPH-MCNC: 3 G/DL — LOW (ref 3.5–5)
ALP SERPL-CCNC: 49 U/L — SIGNIFICANT CHANGE UP (ref 40–120)
ALT FLD-CCNC: 65 U/L DA — HIGH (ref 10–60)
ANION GAP SERPL CALC-SCNC: 6 MMOL/L — SIGNIFICANT CHANGE UP (ref 5–17)
APAP SERPL-MCNC: <2 UG/ML — LOW (ref 10–30)
AST SERPL-CCNC: 41 U/L — HIGH (ref 10–40)
BASOPHILS # BLD AUTO: 0.06 K/UL — SIGNIFICANT CHANGE UP (ref 0–0.2)
BASOPHILS NFR BLD AUTO: 0.6 % — SIGNIFICANT CHANGE UP (ref 0–2)
BILIRUB SERPL-MCNC: 0.2 MG/DL — SIGNIFICANT CHANGE UP (ref 0.2–1.2)
BUN SERPL-MCNC: 10 MG/DL — SIGNIFICANT CHANGE UP (ref 7–18)
CALCIUM SERPL-MCNC: 7.8 MG/DL — LOW (ref 8.4–10.5)
CARBAMAZEPINE SERPL-MCNC: <0.5 UG/ML — LOW (ref 4–12)
CHLORIDE SERPL-SCNC: 113 MMOL/L — HIGH (ref 96–108)
CO2 SERPL-SCNC: 25 MMOL/L — SIGNIFICANT CHANGE UP (ref 22–31)
CREAT SERPL-MCNC: 0.62 MG/DL — SIGNIFICANT CHANGE UP (ref 0.5–1.3)
EOSINOPHIL # BLD AUTO: 0.29 K/UL — SIGNIFICANT CHANGE UP (ref 0–0.5)
EOSINOPHIL NFR BLD AUTO: 2.7 % — SIGNIFICANT CHANGE UP (ref 0–6)
ETHANOL SERPL-MCNC: 268 MG/DL — HIGH (ref 0–10)
GLUCOSE SERPL-MCNC: 103 MG/DL — HIGH (ref 70–99)
HCG SERPL-ACNC: <1 MIU/ML — SIGNIFICANT CHANGE UP
HCT VFR BLD CALC: 37.2 % — SIGNIFICANT CHANGE UP (ref 34.5–45)
HGB BLD-MCNC: 12.8 G/DL — SIGNIFICANT CHANGE UP (ref 11.5–15.5)
IMM GRANULOCYTES NFR BLD AUTO: 0.2 % — SIGNIFICANT CHANGE UP (ref 0–1.5)
LITHIUM SERPL-MCNC: <0.2 MMOL/L — LOW (ref 0.6–1.2)
LYMPHOCYTES # BLD AUTO: 4.86 K/UL — HIGH (ref 1–3.3)
LYMPHOCYTES # BLD AUTO: 44.8 % — HIGH (ref 13–44)
MCHC RBC-ENTMCNC: 32.2 PG — SIGNIFICANT CHANGE UP (ref 27–34)
MCHC RBC-ENTMCNC: 34.4 GM/DL — SIGNIFICANT CHANGE UP (ref 32–36)
MCV RBC AUTO: 93.5 FL — SIGNIFICANT CHANGE UP (ref 80–100)
MONOCYTES # BLD AUTO: 0.85 K/UL — SIGNIFICANT CHANGE UP (ref 0–0.9)
MONOCYTES NFR BLD AUTO: 7.8 % — SIGNIFICANT CHANGE UP (ref 2–14)
NEUTROPHILS # BLD AUTO: 4.77 K/UL — SIGNIFICANT CHANGE UP (ref 1.8–7.4)
NEUTROPHILS NFR BLD AUTO: 43.9 % — SIGNIFICANT CHANGE UP (ref 43–77)
NRBC # BLD: 0 /100 WBCS — SIGNIFICANT CHANGE UP (ref 0–0)
PLATELET # BLD AUTO: 254 K/UL — SIGNIFICANT CHANGE UP (ref 150–400)
POTASSIUM SERPL-MCNC: 3.8 MMOL/L — SIGNIFICANT CHANGE UP (ref 3.5–5.3)
POTASSIUM SERPL-SCNC: 3.8 MMOL/L — SIGNIFICANT CHANGE UP (ref 3.5–5.3)
PROT SERPL-MCNC: 6.6 G/DL — SIGNIFICANT CHANGE UP (ref 6–8.3)
RBC # BLD: 3.98 M/UL — SIGNIFICANT CHANGE UP (ref 3.8–5.2)
RBC # FLD: 14.6 % — HIGH (ref 10.3–14.5)
SALICYLATES SERPL-MCNC: 2.3 MG/DL — LOW (ref 2.8–20)
SARS-COV-2 RNA SPEC QL NAA+PROBE: SIGNIFICANT CHANGE UP
SODIUM SERPL-SCNC: 144 MMOL/L — SIGNIFICANT CHANGE UP (ref 135–145)
TSH SERPL-MCNC: 1.71 UU/ML — SIGNIFICANT CHANGE UP (ref 0.34–4.82)
VALPROATE SERPL-MCNC: <3 UG/ML — LOW (ref 50–100)
WBC # BLD: 10.85 K/UL — HIGH (ref 3.8–10.5)
WBC # FLD AUTO: 10.85 K/UL — HIGH (ref 3.8–10.5)

## 2020-10-20 RX ORDER — MIDAZOLAM HYDROCHLORIDE 1 MG/ML
2 INJECTION, SOLUTION INTRAMUSCULAR; INTRAVENOUS ONCE
Refills: 0 | Status: DISCONTINUED | OUTPATIENT
Start: 2020-10-20 | End: 2020-10-20

## 2020-10-20 RX ORDER — HALOPERIDOL DECANOATE 100 MG/ML
5 INJECTION INTRAMUSCULAR ONCE
Refills: 0 | Status: COMPLETED | OUTPATIENT
Start: 2020-10-20 | End: 2020-10-20

## 2020-10-20 RX ADMIN — MIDAZOLAM HYDROCHLORIDE 2 MILLIGRAM(S): 1 INJECTION, SOLUTION INTRAMUSCULAR; INTRAVENOUS at 18:06

## 2020-10-20 RX ADMIN — HALOPERIDOL DECANOATE 5 MILLIGRAM(S): 100 INJECTION INTRAMUSCULAR at 18:06

## 2020-10-20 RX ADMIN — Medication 2 MILLIGRAM(S): at 18:16

## 2020-10-20 NOTE — ED ADULT NURSE NOTE - NSIMPLEMENTINTERV_GEN_ALL_ED
Implemented All Fall with Harm Risk Interventions:  [place] to call system. Call bell, personal items and telephone within reach. Instruct patient to call for assistance. Room bathroom lighting operational. Non-slip footwear when patient is off stretcher. Physically safe environment: no spills, clutter or unnecessary equipment. Stretcher in lowest position, wheels locked, appropriate side rails in place. Provide visual cue, wrist band, yellow gown, etc. Monitor gait and stability. Monitor for mental status changes and reorient to person, place, and time. Review medications for side effects contributing to fall risk. Reinforce activity limits and safety measures with patient and family. Provide visual clues: red socks.

## 2020-10-20 NOTE — ED ADULT TRIAGE NOTE - BP NONINVASIVE DIASTOLIC (MM HG)
O-L Flap Text: The defect edges were debeveled with a #15 scalpel blade.  Given the location of the defect, shape of the defect and the proximity to free margins an O-L flap was deemed most appropriate.  Using a sterile surgical marker, an appropriate advancement flap was drawn incorporating the defect and placing the expected incisions within the relaxed skin tension lines where possible.    The area thus outlined was incised deep to adipose tissue with a #15 scalpel blade.  The skin margins were undermined to an appropriate distance in all directions utilizing iris scissors. 84

## 2020-10-20 NOTE — ED PROVIDER NOTE - CLINICAL SUMMARY MEDICAL DECISION MAKING FREE TEXT BOX
36 y/o F pt w/sx concerning for acute EtOH withdrawal. Will provide chemical anxiolysis and admit under CYWA scale. 34 y/o F pt w/sx concerning for acute EtOH withdrawal. Will provide chemical anxiolysis for agitation and admit under CYWA scale.

## 2020-10-20 NOTE — ED ADULT NURSE NOTE - CHPI ED NUR SYMPTOMS NEG
no disorientation/no abdominal pain/no chills/no abdominal distension/no nausea/no pain/no confusion/no vomiting/no fever

## 2020-10-20 NOTE — ED PROVIDER NOTE - OBJECTIVE STATEMENT
36 y/o F pt with a PMHx of Chronic EtOH abuse, EtOH withdrawal Sz, and Depression BIB EMS with  after  found pt faced down on lawn when arriving home from work at 16:00 today.  also reports pt mentioned wanting to kill herself.  states after finding pt faced down, pt proceeded to eat and drink afterwards. Hx limited due to need to intervene.

## 2020-10-20 NOTE — ED PROVIDER NOTE - PMH
Alcohol abuse    Alcohol withdrawal seizure    Depression    Post traumatic stress disorder (PTSD)

## 2020-10-21 DIAGNOSIS — F32.9 MAJOR DEPRESSIVE DISORDER, SINGLE EPISODE, UNSPECIFIED: ICD-10-CM

## 2020-10-21 DIAGNOSIS — F10.239 ALCOHOL DEPENDENCE WITH WITHDRAWAL, UNSPECIFIED: ICD-10-CM

## 2020-10-21 DIAGNOSIS — F10.929 ALCOHOL USE, UNSPECIFIED WITH INTOXICATION, UNSPECIFIED: ICD-10-CM

## 2020-10-21 DIAGNOSIS — F43.10 POST-TRAUMATIC STRESS DISORDER, UNSPECIFIED: ICD-10-CM

## 2020-10-21 DIAGNOSIS — Z29.9 ENCOUNTER FOR PROPHYLACTIC MEASURES, UNSPECIFIED: ICD-10-CM

## 2020-10-21 LAB
A1C WITH ESTIMATED AVERAGE GLUCOSE RESULT: 5.3 % — SIGNIFICANT CHANGE UP (ref 4–5.6)
ALBUMIN SERPL ELPH-MCNC: 3.4 G/DL — LOW (ref 3.5–5)
ALP SERPL-CCNC: 46 U/L — SIGNIFICANT CHANGE UP (ref 40–120)
ALT FLD-CCNC: 64 U/L DA — HIGH (ref 10–60)
ANION GAP SERPL CALC-SCNC: 6 MMOL/L — SIGNIFICANT CHANGE UP (ref 5–17)
APPEARANCE UR: ABNORMAL
AST SERPL-CCNC: 35 U/L — SIGNIFICANT CHANGE UP (ref 10–40)
BACTERIA # UR AUTO: ABNORMAL /HPF
BILIRUB DIRECT SERPL-MCNC: <0.1 MG/DL — SIGNIFICANT CHANGE UP (ref 0–0.2)
BILIRUB INDIRECT FLD-MCNC: >0.1 MG/DL — LOW (ref 0.2–1)
BILIRUB SERPL-MCNC: 0.2 MG/DL — SIGNIFICANT CHANGE UP (ref 0.2–1.2)
BILIRUB UR-MCNC: NEGATIVE — SIGNIFICANT CHANGE UP
BUN SERPL-MCNC: 11 MG/DL — SIGNIFICANT CHANGE UP (ref 7–18)
CALCIUM SERPL-MCNC: 8.5 MG/DL — SIGNIFICANT CHANGE UP (ref 8.4–10.5)
CHLORIDE SERPL-SCNC: 110 MMOL/L — HIGH (ref 96–108)
CO2 SERPL-SCNC: 27 MMOL/L — SIGNIFICANT CHANGE UP (ref 22–31)
COLOR SPEC: YELLOW — SIGNIFICANT CHANGE UP
COMMENT - URINE: SIGNIFICANT CHANGE UP
CREAT SERPL-MCNC: 0.53 MG/DL — SIGNIFICANT CHANGE UP (ref 0.5–1.3)
DIFF PNL FLD: NEGATIVE — SIGNIFICANT CHANGE UP
EPI CELLS # UR: SIGNIFICANT CHANGE UP /HPF
ESTIMATED AVERAGE GLUCOSE: 105 MG/DL — SIGNIFICANT CHANGE UP (ref 68–114)
GLUCOSE SERPL-MCNC: 83 MG/DL — SIGNIFICANT CHANGE UP (ref 70–99)
GLUCOSE UR QL: NEGATIVE — SIGNIFICANT CHANGE UP
HCG UR QL: NEGATIVE — SIGNIFICANT CHANGE UP
HCT VFR BLD CALC: 38.4 % — SIGNIFICANT CHANGE UP (ref 34.5–45)
HGB BLD-MCNC: 12.9 G/DL — SIGNIFICANT CHANGE UP (ref 11.5–15.5)
KETONES UR-MCNC: NEGATIVE — SIGNIFICANT CHANGE UP
LACTATE SERPL-SCNC: 1.1 MMOL/L — SIGNIFICANT CHANGE UP (ref 0.7–2)
LEUKOCYTE ESTERASE UR-ACNC: ABNORMAL
MAGNESIUM SERPL-MCNC: 1.9 MG/DL — SIGNIFICANT CHANGE UP (ref 1.6–2.6)
MCHC RBC-ENTMCNC: 32 PG — SIGNIFICANT CHANGE UP (ref 27–34)
MCHC RBC-ENTMCNC: 33.6 GM/DL — SIGNIFICANT CHANGE UP (ref 32–36)
MCV RBC AUTO: 95.3 FL — SIGNIFICANT CHANGE UP (ref 80–100)
NITRITE UR-MCNC: NEGATIVE — SIGNIFICANT CHANGE UP
NRBC # BLD: 0 /100 WBCS — SIGNIFICANT CHANGE UP (ref 0–0)
PH UR: 5 — SIGNIFICANT CHANGE UP (ref 5–8)
PHOSPHATE SERPL-MCNC: 3.1 MG/DL — SIGNIFICANT CHANGE UP (ref 2.5–4.5)
PLATELET # BLD AUTO: 270 K/UL — SIGNIFICANT CHANGE UP (ref 150–400)
POTASSIUM SERPL-MCNC: 3.5 MMOL/L — SIGNIFICANT CHANGE UP (ref 3.5–5.3)
POTASSIUM SERPL-SCNC: 3.5 MMOL/L — SIGNIFICANT CHANGE UP (ref 3.5–5.3)
PROT SERPL-MCNC: 6.6 G/DL — SIGNIFICANT CHANGE UP (ref 6–8.3)
PROT UR-MCNC: 15
RBC # BLD: 4.03 M/UL — SIGNIFICANT CHANGE UP (ref 3.8–5.2)
RBC # FLD: 14.7 % — HIGH (ref 10.3–14.5)
RBC CASTS # UR COMP ASSIST: SIGNIFICANT CHANGE UP /HPF (ref 0–2)
SARS-COV-2 IGG SERPL QL IA: NEGATIVE — SIGNIFICANT CHANGE UP
SARS-COV-2 IGM SERPL IA-ACNC: <3.8 AU/ML — SIGNIFICANT CHANGE UP
SODIUM SERPL-SCNC: 143 MMOL/L — SIGNIFICANT CHANGE UP (ref 135–145)
SP GR SPEC: 1.02 — SIGNIFICANT CHANGE UP (ref 1.01–1.02)
TSH SERPL-MCNC: 1.22 UU/ML — SIGNIFICANT CHANGE UP (ref 0.34–4.82)
UROBILINOGEN FLD QL: NEGATIVE — SIGNIFICANT CHANGE UP
WBC # BLD: 10.24 K/UL — SIGNIFICANT CHANGE UP (ref 3.8–10.5)
WBC # FLD AUTO: 10.24 K/UL — SIGNIFICANT CHANGE UP (ref 3.8–10.5)
WBC UR QL: SIGNIFICANT CHANGE UP /HPF (ref 0–5)

## 2020-10-21 PROCEDURE — 70450 CT HEAD/BRAIN W/O DYE: CPT | Mod: 26

## 2020-10-21 PROCEDURE — 99285 EMERGENCY DEPT VISIT HI MDM: CPT

## 2020-10-21 PROCEDURE — 90792 PSYCH DIAG EVAL W/MED SRVCS: CPT

## 2020-10-21 PROCEDURE — 72125 CT NECK SPINE W/O DYE: CPT | Mod: 26

## 2020-10-21 PROCEDURE — 99223 1ST HOSP IP/OBS HIGH 75: CPT

## 2020-10-21 PROCEDURE — 99222 1ST HOSP IP/OBS MODERATE 55: CPT

## 2020-10-21 RX ORDER — ENOXAPARIN SODIUM 100 MG/ML
30 INJECTION SUBCUTANEOUS DAILY
Refills: 0 | Status: DISCONTINUED | OUTPATIENT
Start: 2020-10-21 | End: 2020-10-21

## 2020-10-21 RX ORDER — ENOXAPARIN SODIUM 100 MG/ML
40 INJECTION SUBCUTANEOUS DAILY
Refills: 0 | Status: DISCONTINUED | OUTPATIENT
Start: 2020-10-21 | End: 2020-10-22

## 2020-10-21 RX ORDER — FOLIC ACID 0.8 MG
1 TABLET ORAL DAILY
Refills: 0 | Status: DISCONTINUED | OUTPATIENT
Start: 2020-10-21 | End: 2020-10-22

## 2020-10-21 RX ORDER — CITALOPRAM 10 MG/1
20 TABLET, FILM COATED ORAL DAILY
Refills: 0 | Status: DISCONTINUED | OUTPATIENT
Start: 2020-10-21 | End: 2020-10-22

## 2020-10-21 RX ORDER — PANTOPRAZOLE SODIUM 20 MG/1
40 TABLET, DELAYED RELEASE ORAL
Refills: 0 | Status: DISCONTINUED | OUTPATIENT
Start: 2020-10-21 | End: 2020-10-22

## 2020-10-21 RX ORDER — SODIUM CHLORIDE 9 MG/ML
1000 INJECTION INTRAMUSCULAR; INTRAVENOUS; SUBCUTANEOUS
Refills: 0 | Status: DISCONTINUED | OUTPATIENT
Start: 2020-10-21 | End: 2020-10-22

## 2020-10-21 RX ORDER — THIAMINE MONONITRATE (VIT B1) 100 MG
500 TABLET ORAL EVERY 8 HOURS
Refills: 0 | Status: DISCONTINUED | OUTPATIENT
Start: 2020-10-21 | End: 2020-10-22

## 2020-10-21 RX ORDER — ERGOCALCIFEROL 1.25 MG/1
50000 CAPSULE ORAL
Refills: 0 | Status: DISCONTINUED | OUTPATIENT
Start: 2020-10-21 | End: 2020-10-22

## 2020-10-21 RX ADMIN — CITALOPRAM 20 MILLIGRAM(S): 10 TABLET, FILM COATED ORAL at 13:40

## 2020-10-21 RX ADMIN — Medication 25 MILLIGRAM(S): at 13:41

## 2020-10-21 RX ADMIN — Medication 25 MILLIGRAM(S): at 21:19

## 2020-10-21 RX ADMIN — ENOXAPARIN SODIUM 40 MILLIGRAM(S): 100 INJECTION SUBCUTANEOUS at 21:19

## 2020-10-21 RX ADMIN — Medication 1 MILLIGRAM(S): at 13:40

## 2020-10-21 RX ADMIN — Medication 105 MILLIGRAM(S): at 21:19

## 2020-10-21 RX ADMIN — Medication 1 TABLET(S): at 13:40

## 2020-10-21 RX ADMIN — SODIUM CHLORIDE 80 MILLILITER(S): 9 INJECTION INTRAMUSCULAR; INTRAVENOUS; SUBCUTANEOUS at 03:03

## 2020-10-21 RX ADMIN — Medication 105 MILLIGRAM(S): at 13:39

## 2020-10-21 RX ADMIN — Medication 2 MILLIGRAM(S): at 02:58

## 2020-10-21 RX ADMIN — PANTOPRAZOLE SODIUM 40 MILLIGRAM(S): 20 TABLET, DELAYED RELEASE ORAL at 08:36

## 2020-10-21 RX ADMIN — Medication 2 MILLIGRAM(S): at 06:22

## 2020-10-21 RX ADMIN — Medication 105 MILLIGRAM(S): at 06:22

## 2020-10-21 NOTE — BEHAVIORAL HEALTH ASSESSMENT NOTE - NSBHCONSULTRECOMMENDOTHER_PSY_A_CORE FT
1. C/w home antidepressant, Celexa 20 mg qd  2. Withdrawal management as directed by primary team  3. Pt is psych cleared for D/C home as soon as she is medically optimized  4. Pt intends to return to her current psychiatric provider at [facility] after DC  5. Psychiatry is signing off. Reconsult if additional issues arise as inpatient  6. Case d/w Dr. Albert of primary team    Chayo Lion MD  Director, Consultation-Liaison Psychiatry Service  [id number]

## 2020-10-21 NOTE — H&P ADULT - NSHPLABSRESULTS_GEN_ALL_CORE
12.8   10.85 )-----------( 254      ( 20 Oct 2020 21:58 )             37.2       10-20    144  |  113<H>  |  10  ----------------------------<  103<H>  3.8   |  25  |  0.62    Ca    7.8<L>      20 Oct 2020 21:58    TPro  6.6  /  Alb  3.0<L>  /  TBili  0.2  /  DBili  x   /  AST  41<H>  /  ALT  65<H>  /  AlkPhos  49  10-20                      Lactate Trend            CAPILLARY BLOOD GLUCOSE      POCT Blood Glucose.: 113 mg/dL (20 Oct 2020 17:53)

## 2020-10-21 NOTE — H&P ADULT - HISTORY OF PRESENT ILLNESS
36 y/o F from home, AAOX3 baseline   with a PMHx of chronic alcohol abuse ( multiple admissions for alcohol intoxication) , alcohol withdrawal seizure, Depression was brought into the ED after  found her unresponsive in the lawn. Pt is AAOX2, mildly drowsy in ED, spouse Mr. Carter was not reachable. All the collateral information was obtained from the ED provider and past records.   Pt apparently has a known history of alcohol abuse but has been increasingly drinking ( tequila) over the past 5 days. 34 y/o F from home, AAOX3 baseline   with a PMHx of chronic alcohol abuse ( multiple admissions for alcohol intoxication) , alcohol withdrawal seizure, PTSD,  Depression was brought into the ED after  found her unresponsive in the lawn. Pt is AAOX2, mildly drowsy in ED, spouse Mr. Carter  [phone] was not reachable. All the collateral information was obtained from the ED provider and past records. Pt apparently has a known history of alcohol abuse but has been increasingly drinking ( tequila) over the past 5 days. When  returned from work he found the pt lying unresponsive in the lawn outside the house.  has not witnessed any seizures upon arrival but has strong suspicion as she was recently admitted at [facility] Sep 2020 for alcohol intoxication and Seizures. Pt had a Neurological work up back then and no Anti- convulsant medication was indicated on discharge. As per the ED note, Pt has also been displaying some suicidal thoughts earlier at home.       In the ED,   Pt is AAOX1, noted to be drowsy   Pt was noted to be agitated upon ED arrival, s/p Ativan 2mg, versed 2mg and haldol   Vitals- 135/80, -110   CIWA 3-4       Med rec reinstated from the Discharge medication list from SEP 2020  Primary team to confirm med list with pt when awake or with the .

## 2020-10-21 NOTE — BEHAVIORAL HEALTH ASSESSMENT NOTE - NSBHSOCIALHXDETAILSFT_PSY_A_CORE
B/R in [place], came to [place] with family @age 12. Formerly  but  in 2014. Now remarried, lives with  and 15 yo son in [place]. 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

## 2020-10-21 NOTE — PROGRESS NOTE ADULT - ASSESSMENT
a 35 y/oF, from home, AAOX3 baseline, w/ PMHx of chronic alcohol abuse (h/o multiple admissions for alcohol intoxication), alcohol withdrawal seizure, PTSD, and Depression, presented after found unresponsive in the lawn.     In the ED,   Pt is AAOX1, noted to be drowsy   Pt was noted to be agitated upon ED arrival, s/p Ativan 2mg, versed 2mg and haldol    a 35 y/oF, from home, AAOX3 baseline, w/ PMHx of chronic alcohol abuse (h/o multiple admissions for alcohol intoxication), alcohol withdrawal seizure, PTSD, and Depression, presented after found unresponsive in the lawn.

## 2020-10-21 NOTE — CONSULT NOTE ADULT - ATTENDING COMMENTS
I counseled the primary team about the nature of the patient's presentation and low likelihood that antiepileptic medications would be needed.

## 2020-10-21 NOTE — H&P ADULT - ATTENDING COMMENTS
Pt seen and examined.  Case discussed with MAR  35 year old woman with PMH of chronic alcohol abuse with multiple complications including intoxication and withdrawal seizures; clinical depression with frequent ED visits and hospital admissions.   She was brought in again today as a result of alcohol intoxication after a few days of binging. She was found out in the lawn per  who witnessed a seizure and subsequent decreased consciousness.    Vital Signs Last 24 Hrs  T(C): --  T(F): --  HR: 109 (20 Oct 2020 23:53) (107 - 142)  BP: 135/80 (20 Oct 2020 23:53) (127/74 - 142/75)  RR: 21 (20 Oct 2020 23:53) (15 - 21)  SpO2: 95% (20 Oct 2020 23:53) (93% - 98%)        labs                        12.8   10.85 )-----------( 254      ( 20 Oct 2020 21:58 )             37.2     10-20    144  |  113  |  10  ----------------------------<  103<H>  3.8   |  25  |  0.62  Ca    7.8<L>      20 Oct 2020 21:58  TPro  6.6  /  Alb  3.0<L>  /  TBili  0.2  /  DBili  x   /  AST  41<H>  /  ALT  65<H>  /  AlkPhos  49  10-20    Alc- 268  CXR unremarkable   EKG     Impression  - Chronic alcohol abuse  - Acute alcohol intoxication with seizure  - Concern for alcohol withdrawal  - Clinical depression with suicidal ideation. Pt seen and examined.  Case discussed with MAR  35 year old woman with PMH of chronic alcohol abuse with multiple complications including withdrawal seizures; clinical depression with frequent ED visits and hospital admissions.   She was brought in again today as a result of alcohol intoxication after a few days of binging. She was found out in the lawn per  who thought she had a seizure and subsequent decreased consciousness.    Vital Signs Last 24 Hrs  T(C): 37.3 (21 Oct 2020 01:07), Max: 37.3 (21 Oct 2020 01:07)  T(F): 99.1 (21 Oct 2020 01:07), Max: 99.1 (21 Oct 2020 01:07)  HR: 109 (21 Oct 2020 01:07) (107 - 142)  BP: 144/82 (21 Oct 2020 01:07) (127/74 - 144/82)  RR: 22 (21 Oct 2020 01:07) (15 - 22)  SpO2: 97% (21 Oct 2020 01:07) (93% - 98%)      labs                        12.8   10.85 )-----------( 254      ( 20 Oct 2020 21:58 )             37.2     10-20    144  |  113  |  10  ----------------------------<  103<H>  3.8   |  25  |  0.62  Ca    7.8<L>      20 Oct 2020 21:58  TPro  6.6  /  Alb  3.0<L>  /  TBili  0.2  /  DBili  x   /  AST  41<H>  /  ALT  65<H>  /  AlkPhos  49  10-20    Alc- 268  CXR unremarkable   EKG     Impression  - Chronic alcohol abuse  - Acute alcohol intoxication with concern for seizure  - Concern for alcohol withdrawal  - Clinical depression with suicidal ideation. Pt seen and examined.  Case discussed with MAR  35 year old woman with PMH of chronic alcohol abuse with multiple complications including withdrawal seizures; clinical depression with frequent ED visits and hospital admissions.   She was brought in again today as a result of alcohol intoxication after a few days of binging. She was found out in the lawn per  who thought she had a seizure and subsequent decreased consciousness.  Pt unable to provide hx- she cannot recollect.    Vital Signs Last 24 Hrs  T(C): 37.3 (21 Oct 2020 01:07), Max: 37.3 (21 Oct 2020 01:07)  T(F): 99.1 (21 Oct 2020 01:07), Max: 99.1 (21 Oct 2020 01:07)  HR: 109 (21 Oct 2020 01:07) (107 - 142)  BP: 144/82 (21 Oct 2020 01:07) (127/74 - 144/82)  RR: 22 (21 Oct 2020 01:07) (15 - 22)  SpO2: 97% (21 Oct 2020 01:07) (93% - 98%)    Young woman, lying in bed, in and out of sleep, NAD   CTA B/L RRR S1S2 only  Soft NT ND BS +  No focal deficits    labs                        12.8   10.85 )-----------( 254      ( 20 Oct 2020 21:58 )             37.2     10-20    144  |  113  |  10  ----------------------------<  103<H>  3.8   |  25  |  0.62  Ca    7.8<L>      20 Oct 2020 21:58  TPro  6.6  /  Alb  3.0<L>  /  TBili  0.2  /  DBili  x   /  AST  41<H>  /  ALT  65<H>  /  AlkPhos  49  10-20    Alc- 268  CXR unremarkable   EKG - sinus tachycardia, ?LAE    Impression  - Chronic alcohol abuse  Couselling  SW consult    - Acute alcohol intoxication with concern for seizure/withdrawal  Admit to Medicine with fall and seizure protocol  Initiate CIWA protocol for high risk  Thiamine 500mg IV q 8 hourly X 5 days  IVF hydration with MVI  Check and replace electrolyte deficits    - Clinical depression with suicidal ideation.  resume home meds  Psych consultation  Continue 1:1 sitter

## 2020-10-21 NOTE — H&P ADULT - PROBLEM SELECTOR PLAN 2
Pt has PMH of Alc withdrawal seizures  [facility]. NO anti convulsants were indicated on discharge  Pt brought in today after unwitnessed fall  f/u CT head  Neuro consult- Dr. Tamayo Pt has PMH of Alc withdrawal seizures  Pt ws recently admitted at [facility] Sep 2020 for alc withdrawal seizures. NO anti convulsants were indicated on discharge  Pt brought in today after unwitnessed fall  f/u CT head

## 2020-10-21 NOTE — PROGRESS NOTE ADULT - PROBLEM SELECTOR PLAN 4
Pt has PMH of PTSD  Home meds citalopram   c/w home meds - h/o PTSD  - on citalopram at home   - c/w home meds - h/o PTSD  - on citalopram at home   - c/w citalopram 25mg qd

## 2020-10-21 NOTE — BEHAVIORAL HEALTH ASSESSMENT NOTE - NSBHCHARTREVIEWVS_PSY_A_CORE FT
Vital Signs Last 24 Hrs  T(C): 37.2 (21 Oct 2020 10:38), Max: 37.3 (21 Oct 2020 01:07)  T(F): 99 (21 Oct 2020 10:38), Max: 99.1 (21 Oct 2020 01:07)  HR: 97 (21 Oct 2020 10:38) (97 - 142)  BP: 147/67 (21 Oct 2020 10:38) (127/74 - 157/80)  BP(mean): --  RR: 17 (21 Oct 2020 10:38) (15 - 22)  SpO2: 98% (21 Oct 2020 10:38) (93% - 99%)

## 2020-10-21 NOTE — BEHAVIORAL HEALTH ASSESSMENT NOTE - HPI (INCLUDE ILLNESS QUALITY, SEVERITY, DURATION, TIMING, CONTEXT, MODIFYING FACTORS, ASSOCIATED SIGNS AND SYMPTOMS)
36 yo Polish-American F,  and living with  and 15 yo son and working as [place]  for [employer], with PHx of depression f/w psychiatrist from [facility] and taking Celexa 20 mg qd, and recurrent severe alcohol use DO with multiple recent admissions for alcohol withdrawal at this hospital, known to writer from previous consults in 11/2019 and 2/2020, BIB  late on 10/20 for alcohol intoxication (found unresponsive on lawn in front of their house) and admitted for management of intoxication. Psych consult was requested to r/o SI, due to 's report to primary team (similar to many such reports during past admissions) that he fears pt has been having suicidal thoughts at [facility] recently. Pt seen in her room for interview, lying in bed napping but waking easily to voice. Pt proves to be completely oriented and confirms that she is here for alcohol intoxication, but strongly denies SI ("No, I have never wanted to kill myself"). When informed that her  has expressed concern that she could be suicidal, pt says, "This is embarrassing" and starts to cry. Pt confirms that, since prior encounter with writer in 2/2020, she has continue to see a psychiatrist at [facility] on a weekly basis (via phone sessions) and finds it helpful. Pt says she is taking Celexa 20 mg which she finds helpful. 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, working on roads and bridges) and would rather attempt rehab when she is on seasonal layoff, which starts in December. Pt describes mood as "OK" and denies SI/HI/AVH.

## 2020-10-21 NOTE — PROGRESS NOTE ADULT - SUBJECTIVE AND OBJECTIVE BOX
PGY-1 Progress Note discussed with attending    PAGER #: [[phone]] TILL 5:00 PM  PLEASE CONTACT ON CALL TEAM:  - On Call Team (Please refer to Justina) FROM 5:00 PM - 8:30PM  - Nightfloat Team FROM 8:30 -7:30 AM    INTERVAL HPI/OVERNIGHT EVENTS:   - No acute events overnight. Patient remains drowsy, requiring multiple verbal stimulations; however, she is able to follow verbal directives. She is not agitated. She denies headache, chest pain, sob, tremors, and urinary or bowel movement changes.    REVIEW OF SYSTEMS:  CONSTITUTIONAL: complains of drowsiness; No fever, weight loss, or fatigue  RESPIRATORY: No cough, wheezing, chills or hemoptysis; No shortness of breath  CARDIOVASCULAR: No chest pain, palpitations, dizziness, or leg swelling  GASTROINTESTINAL: No abdominal pain. No nausea, vomiting, or hematemesis; No diarrhea or constipation. No melena or hematochezia.  GENITOURINARY: No dysuria or hematuria, urinary frequency  NEUROLOGICAL: No headaches, memory loss, loss of strength, numbness, or tremors  SKIN: No itching, burning, rashes, or lesions     MEDICATIONS  (STANDING):  citalopram 20 milliGRAM(s) Oral daily  ergocalciferol 22628 Unit(s) Oral <User Schedule>  folic acid 1 milliGRAM(s) Oral daily  LORazepam   Injectable 2 milliGRAM(s) IV Push every 4 hours  multivitamin 1 Tablet(s) Oral daily  pantoprazole    Tablet 40 milliGRAM(s) Oral before breakfast  sodium chloride 0.9%. 1000 milliLiter(s) (80 mL/Hr) IV Continuous <Continuous>  thiamine IVPB 500 milliGRAM(s) IV Intermittent every 8 hours    MEDICATIONS  (PRN):  LORazepam   Injectable 2 milliGRAM(s) IV Push every 2 hours PRN Agitation      Vital Signs Last 24 Hrs  T(C): 37.1 (21 Oct 2020 07:08), Max: 37.3 (21 Oct 2020 01:07)  T(F): 98.7 (21 Oct 2020 07:08), Max: 99.1 (21 Oct 2020 01:07)  HR: 99 (21 Oct 2020 07:08) (99 - 142)  BP: 152/89 (21 Oct 2020 07:08) (127/74 - 157/80)  BP(mean): --  RR: 18 (21 Oct 2020 07:08) (15 - 22)  SpO2: 99% (21 Oct 2020 07:08) (93% - 99%)    PHYSICAL EXAMINATION:  GENERAL: NAD, AAOx3  HEAD: AT/NC  EYES: conjunctiva and sclera clear  NECK: supple, No JVD noted, Normal thyroid  CHEST/LUNG: CTABL; no rales, rhonchi, wheezing, or rubs  HEART: regular rate and rhythm; no murmurs, rubs, or gallops  ABDOMEN: soft, nontender, nondistended; Bowel sounds present  EXTREMITIES:  2+ Peripheral Pulses, No clubbing, cyanosis, or edema                          12.9   10.24 )-----------( 270      ( 21 Oct 2020 06:52 )             38.4     10-21    143  |  110<H>  |  11  ----------------------------<  83  3.5   |  27  |  0.53    Ca    8.5      21 Oct 2020 06:52  Phos  3.1     10-21  Mg     1.9     10-21    TPro  6.6  /  Alb  3.4<L>  /  TBili  0.2  /  DBili  <0.1  /  AST  35  /  ALT  64<H>  /  AlkPhos  46  10-21    LIVER FUNCTIONS - ( 21 Oct 2020 06:52 )  Alb: 3.4 g/dL / Pro: 6.6 g/dL / ALK PHOS: 46 U/L / ALT: 64 U/L DA / AST: 35 U/L / GGT: x                 COVID-19 PCR: NotDetec (20 Oct 2020 21:57)      CAPILLARY BLOOD GLUCOSE      POCT Blood Glucose.: 113 mg/dL (20 Oct 2020 17:53)      RADIOLOGY & ADDITIONAL TESTS:

## 2020-10-21 NOTE — H&P ADULT - PROBLEM SELECTOR PLAN 1
Pt brought in by the EMS after she was found unresponsive in the lawn by  at 4.00pm.  Pt has PMH of ETOH abuse and has been heavily drinking ( tequila) x 5 days.    Pt was noted to agitated and anxious upon arrival, s/p ativan, versed and haldol   Pt was noted to be very drowsy, AAO1 on my encounter  Symptoms likely due to Alc intoxication   CIWA- 3-4  CIWA protocol initiated, ativan standing and PRN  started on MVT, Thiamine and Folic acid  f/u  consult for alc cessation

## 2020-10-21 NOTE — H&P ADULT - NSHPPHYSICALEXAM_GEN_ALL_CORE
Vital Signs (24 Hrs):  T(C): --  HR: 109 (10-20-20 @ 23:53) (107 - 142)  BP: 135/80 (10-20-20 @ 23:53) (127/74 - 142/75)  RR: 21 (10-20-20 @ 23:53) (15 - 21)  SpO2: 95% (10-20-20 @ 23:53) (93% - 98%)  Wt(kg): --  Daily Height in cm: 165.1 (20 Oct 2020 17:52)    Daily     I&O's Summary

## 2020-10-21 NOTE — ED ADULT NURSE REASSESSMENT NOTE - NS ED NURSE REASSESS COMMENT FT1
pt woke up to used the bathroom. Pt is ANO x 2-3 states shes in the hospital because her  sent her here. Pt denies suicidal ideation or homicidal ideation. Safety maintained. Remain on CIWA protocol, yellow gown, and roam alert. Will continue to monitor until pt gets a bed.

## 2020-10-21 NOTE — BEHAVIORAL HEALTH ASSESSMENT NOTE - NSBHCHARTREVIEWLAB_PSY_A_CORE FT
12.9   10.24 )-----------( 270      ( 21 Oct 2020 06:52 )             38.4   10-21    143  |  110<H>  |  11  ----------------------------<  83  3.5   |  27  |  0.53    Ca    8.5      21 Oct 2020 06:52  Phos  3.1     10-21  Mg     1.9     10-21    TPro  6.6  /  Alb  3.4<L>  /  TBili  0.2  /  DBili  <0.1  /  AST  35  /  ALT  64<H>  /  AlkPhos  46  10-21

## 2020-10-21 NOTE — H&P ADULT - ASSESSMENT
36 y/o F from home, AAOX3 baseline   with a PMHx of chronic alcohol abuse ( multiple admissions for alcohol intoxication) , alcohol withdrawal seizure, PTSD,  Depression was brought into the ED after  found her unresponsive in the lawn.     In the ED,   Pt is AAOX1, noted to be drowsy   Pt was noted to be agitated upon ED arrival, s/p Ativan 2mg, versed 2mg and haldol   Vitals- 135/80, -110   CIWA 3-4       Med rec reinstated from the Discharge medication list from SEP 2020

## 2020-10-21 NOTE — CONSULT NOTE ADULT - ASSESSMENT
36yo female w/ ETOH intoxication and withdrawal    Recommendations:    - CIWA protocol and vitamin supplementation per primary team    - Educate and encourage about detox and rehabilitation    - No role for AED at this time     36yo female w/ ETOH intoxication and withdrawal, but without clinical evidence of recent seizure    Recommendations:    - CIWA protocol and vitamin supplementation per primary team    - Educate and encourage about detox and rehabilitation    - No role for AED at this time

## 2020-10-21 NOTE — BEHAVIORAL HEALTH ASSESSMENT NOTE - SUMMARY
36 yo Polish-American F,  and living with  and 15 yo son and working as [place]  for [employer], with PHx of depression f/w psychiatrist from [facility] and taking Celexa 20 mg qd, and recurrent severe alcohol use DO with multiple recent admissions for alcohol withdrawal at this hospital, known to writer from previous consults in 11/2019 and 2/2020, BIB  late on 10/20 for alcohol intoxication (found unresponsive on lawn in front of their house) and admitted for management of intoxication. Psych consult was requested to r/o SI, due to 's report to primary team (similar to many such reports during past admissions) that he fears pt has been having suicidal thoughts at [facility] recently. On exam, pt again endorses h/o depression and alcohol use DO, but strongly denies SI and states that she has "never" had any thoughts of self-harm. Pt remains precontemplative about IP rehab, despite recent h/o repeated admissions to this hospital for alcohol withdrawal. Pt describes home Celexa as helpful and states that she wishes to continue it while admitted. Pt does not appear to present an acute risk of harm to self or others at the time of assessment, and does not appear to be in need of admission to IP psych at the time of assessment.

## 2020-10-21 NOTE — PROGRESS NOTE ADULT - PROBLEM SELECTOR PLAN 2
Pt has PMH of Alc withdrawal seizures  Pt ws recently admitted at [facility] Sep 2020 for alc withdrawal seizures. NO anti convulsants were indicated on discharge  Pt brought in today after unwitnessed fall  f/u CT head - h/o EtOH withdrawal seizures; last admission 09/2020  - p/w unwitnessed fall; unlikely seizure this admission  - CT head neg - h/o EtOH withdrawal seizures; last admission 09/2020  - p/w unwitnessed fall; unlikely seizure this admission  - CT head neg  - s/p ativan 2mg, versed 2mg and haldol 5mg in ED  - c/w thiamine, folic acid, MVT, librium 25mg q8h standing, and ativan 2mg q2h PRN  - [facility] protocol    - DIANDRA consulted

## 2020-10-21 NOTE — BEHAVIORAL HEALTH ASSESSMENT NOTE - NSBHCHARTREVIEWIMAGING_PSY_A_CORE FT
< from: CT Head No Cont (10.21.20 @ 02:01) >    IMPRESSION:  CT Head:  NoCT evidence of intracranial pathology.    CT Cervical Spine: No CT evidence of cervical spine fracture or traumatic malalignment.    < end of copied text >

## 2020-10-21 NOTE — CONSULT NOTE ADULT - SUBJECTIVE AND OBJECTIVE BOX
++++++++++++++++++NOTE NOT COMPLETED+++++++++++++++++++++++++++++++++ ++++++++++++++++++NOTE NOT COMPLETED+++++++++++++++++++++++++++++++++    Patient is a 35y old  Female who presents with a chief complaint of Unresponsiveness (21 Oct 2020 16:12)      HPI:  34 y/o F from home, AAOX3 baseline   with a PMHx of chronic alcohol abuse ( multiple admissions for alcohol intoxication) , alcohol withdrawal seizure, PTSD,  Depression was brought into the ED after  found her unresponsive in the lawn. Pt is AAOX2, mildly drowsy in ED, spouse Mr. Carter  [phone] was not reachable. All the collateral information was obtained from the ED provider and past records. Pt apparently has a known history of alcohol abuse but has been increasingly drinking ( tequila) over the past 5 days. When  returned from work he found the pt lying unresponsive in the lawn outside the house.  has not witnessed any seizures upon arrival but has strong suspicion as she was recently admitted at [facility] Sep 2020 for alcohol intoxication and Seizures. Pt had a Neurological work up back then and no Anti- convulsant medication was indicated on discharge. As per the ED note, Pt has also been displaying some suicidal thoughts earlier at home.       In the ED,   Pt is AAOX1, noted to be drowsy   Pt was noted to be agitated upon ED arrival, s/p Ativan 2mg, versed 2mg and haldol   Vitals- 135/80, -110   CIWA 3-4       Med rec reinstated from the Discharge medication list from SEP 2020  Primary team to confirm med list with pt when awake or with the .    (21 Oct 2020 00:15)    Pt remembers being in her garden waiting for her  to come home.  The next thing she remembers is being in the hospital.   at bedside states when he arrived home he found pt face down on the grass w/ dirt on her face.  When he woke her she got up and pulled down her pants and urinated on the front lawn.  Per  "when she drinks too much."   reports pt has been drinking a quart of tequila daily for the past 4 days.     Neurological Review of Systems:  No difficulty with language.  No vision loss or double vision.  No dizziness, vertigo or new hearing loss.  No difficulty with speech or swallowing.  No focal weakness.  No focal sensory changes.  No numbness or tingling in the bilateral lower extremities.  No difficulty with balance.  No difficulty with ambulation.        MEDICATIONS  (STANDING):  chlordiazePOXIDE 25 milliGRAM(s) Oral every 8 hours  citalopram 20 milliGRAM(s) Oral daily  enoxaparin Injectable 40 milliGRAM(s) SubCutaneous daily  ergocalciferol 36476 Unit(s) Oral <User Schedule>  folic acid 1 milliGRAM(s) Oral daily  multivitamin 1 Tablet(s) Oral daily  pantoprazole    Tablet 40 milliGRAM(s) Oral before breakfast  sodium chloride 0.9%. 1000 milliLiter(s) (80 mL/Hr) IV Continuous <Continuous>  thiamine IVPB 500 milliGRAM(s) IV Intermittent every 8 hours    MEDICATIONS  (PRN):  LORazepam   Injectable 2 milliGRAM(s) IV Push every 2 hours PRN Agitation    Allergies    No Known Allergies    Intolerances      PAST MEDICAL & SURGICAL HISTORY:  Post traumatic stress disorder (PTSD)    Alcohol withdrawal seizure    Depression    Alcohol abuse    No significant past surgical history      FAMILY HISTORY:    SOCIAL HISTORY:  active smoker    Review of Systems:  Constitutional: No generalized weakness. No fevers or chills             Eyes, Ears, Mouth, Throat: No vision loss   Respiratory: No shortness of breath or cough                             Cardiovascular: No chest pain or palpitations  Gastrointestinal: No nausea or vomiting                                      Genitourinary: No urinary incontinence or burning on urination  Musculoskeletal: No joint pain                                                        Dermatologic: No rash  Neurological: as per HPI                                                                      Psychiatric: No behavioral problems  Endocrine: No known hypoglycemia            Hematologic/Lymphatic: No easy bleeding    O:  Vital Signs Last 24 Hrs  T(C): 37.4 (21 Oct 2020 15:04), Max: 37.4 (21 Oct 2020 15:04)  T(F): 99.4 (21 Oct 2020 15:04), Max: 99.4 (21 Oct 2020 15:04)  HR: 76 (21 Oct 2020 15:04) (76 - 142)  BP: 147/98 (21 Oct 2020 15:04) (127/74 - 157/80)  RR: 18 (21 Oct 2020 15:04) (15 - 22)  SpO2: 95% (21 Oct 2020 15:04) (93% - 99%)    General Exam:   General appearance: No acute distress                 Cardiovascular: Pedal dorsalis pulses intact bilaterally    Mental Status: Oriented to self, date and place.  Attention intact.  No dysarthria, aphasia or neglect.  Short and long term memory impaired.        Cranial Nerves: CN I - not tested.  PERRL, EOMI, VFF, no nystagmus or diplopia.  No APD.  Fundi not visualized.  CN V1-3 intact to light touch.  No facial asymmetry.  Hearing intact to finger rub bilaterally.  Tongue, uvula and palate midline.  Sternocleidomastoid and Trapezius intact bilaterally.    Motor:   Tone: Normal                  Strength intact throughout  No pronator drift bilaterally                      No dysmetria on finger-nose-finger or heel-shin-heel  No truncal ataxia.  No resting, postural.  Mild action tremor.  No myoclonus    Sensation: intact to light touch    Deep Tendon Reflexes: 2+ bilateral biceps, triceps, brachioradialis, knee and ankle  Toes flexor bilaterally    Gait: Stable.  Romberg (-)  Other:     LABS:                        12.9   10.24 )-----------( 270      ( 21 Oct 2020 06:52 )             38.4     10-21    143  |  110<H>  |  11  ----------------------------<  83  3.5   |  27  |  0.53    Ca    8.5      21 Oct 2020 06:52  Phos  3.1     10-21  Mg     1.9     10-21    TPro  6.6  /  Alb  3.4<L>  /  TBili  0.2  /  DBili  <0.1  /  AST  35  /  ALT  64<H>  /  AlkPhos  46  10-21      Urinalysis Basic - ( 21 Oct 2020 04:07 )    Color: Yellow / Appearance: Slightly Turbid / S.020 / pH: x  Gluc: x / Ketone: Negative  / Bili: Negative / Urobili: Negative   Blood: x / Protein: 15 / Nitrite: Negative   Leuk Esterase: Trace / RBC: 0-2 /HPF / WBC 3-5 /HPF   Sq Epi: x / Non Sq Epi: Few /HPF / Bacteria: Moderate /HPF          RADIOLOGY & ADDITIONAL STUDIES:  c< from: CT Head No Cont (10.21.20 @ 02:01) >  EXAM:  CT CERVICAL SPINE                          EXAM:  CT BRAIN                            PROCEDURE DATE:  10/21/2020          INTERPRETATION:  CLINICAL INFORMATION: Altered mental status.  Patient found unresponsive, outside in her lawn.  Past medical history of alcohol abuse.    TECHNIQUE:  1.  Noncontrast axial images were acquired through the head.  Sagittal and coronal reformats were generated.  2  Noncontrast axial images were acquired through the cervical spine.  Sagittal and coronal reformats were generated.    COMPARISON STUDY: CT head and cervical spine from 2020.    FINDINGS:  CT head:  There is no CT evidence of acute intracranial hemorrhage, mass effect, midline shift, or hydrocephalus.  There is no CT evidence of an acute or chronic infarct.    The ventricles, sulci and extra-axial spaces appear within normal limits.    The paranasal sinuses, mastoid air cells and middle ear cavities are grossly clear.    The calvarium, skull base and orbits appear within normal limits.      CT cervical spine:  There is preservation of the cervical lordosis.  There is no CT evidence of cervical spine fracture or traumatic malalignment.  There is no suspicious osseous lesion.  The paraspinous soft tissues appear within normal limits.    Degenerative changes:  No clinically significant degenerative changes, spinal canal stenosis or neuroforaminal narrowing is visualized by CT technique.    Incidental findings:  The adenoids are mildly enlarged.      IMPRESSION:  CT Head:  NoCT evidence of intracranial pathology.    CT Cervical Spine: No CT evidence of cervical spine fracture or traumatic malalignment.            MADDISON SPENCE M.D.,ATTENDING RADIOLOGIST  This document has been electronically signed. Oct 21 2020  2:13AM    < end of copied text >   ++++++++++++++++++NOTE NOT COMPLETED+++++++++++++++++++++++++++++++++    Patient is a 35y old  Female who presents with a chief complaint of Unresponsiveness (21 Oct 2020 16:12)      HPI:  36 y/o F from home, AAOX3 baseline   with a PMHx of chronic alcohol abuse ( multiple admissions for alcohol intoxication) , alcohol withdrawal seizure, PTSD,  Depression was brought into the ED after  found her unresponsive in the lawn. Pt is AAOX2, mildly drowsy in ED, spouse Mr. Carter  [phone] was not reachable. All the collateral information was obtained from the ED provider and past records. Pt apparently has a known history of alcohol abuse but has been increasingly drinking ( tequila) over the past 5 days. When  returned from work he found the pt lying unresponsive in the lawn outside the house.  has not witnessed any seizures upon arrival but has strong suspicion as she was recently admitted at [facility] Sep [facility] for alcohol intoxication and Seizures. Pt had a Neurological work up back then and no Anti- convulsant medication was indicated on discharge. As per the ED note, Pt has also been displaying some suicidal thoughts earlier at home.       In the ED,   Pt is AAOX1, noted to be drowsy   Pt was noted to be agitated upon ED arrival, s/p Ativan 2mg, versed 2mg and haldol   Vitals- 135/80, -110   CIWA 3-4       Med rec reinstated from the Discharge medication list from SEP 2020  Primary team to confirm med list with pt when awake or with the .    (21 Oct 2020 00:15)    Pt remembers being in her garden waiting for her  to come home.  The next thing she remembers is being in the hospital.   at bedside states when he arrived home he found pt face down on the grass w/ dirt on her face.  When he woke her she got up and pulled down her pants and urinated on the front lawn.  Per  "when she drinks too much."   reports pt has been drinking a quart of tequila daily for the past 4 days.    denies shaking, tongue bite or inability to control urine or bowels.    Neurological Review of Systems:  No difficulty with language.  No vision loss or double vision.  No dizziness, vertigo or new hearing loss.  No difficulty with speech or swallowing.  No focal weakness.  No focal sensory changes.  No numbness or tingling in the bilateral lower extremities.  No difficulty with balance.  No difficulty with ambulation.        MEDICATIONS  (STANDING):  chlordiazePOXIDE 25 milliGRAM(s) Oral every 8 hours  citalopram 20 milliGRAM(s) Oral daily  enoxaparin Injectable 40 milliGRAM(s) SubCutaneous daily  ergocalciferol 85344 Unit(s) Oral <User Schedule>  folic acid 1 milliGRAM(s) Oral daily  multivitamin 1 Tablet(s) Oral daily  pantoprazole    Tablet 40 milliGRAM(s) Oral before breakfast  sodium chloride 0.9%. 1000 milliLiter(s) (80 mL/Hr) IV Continuous <Continuous>  thiamine IVPB 500 milliGRAM(s) IV Intermittent every 8 hours    MEDICATIONS  (PRN):  LORazepam   Injectable 2 milliGRAM(s) IV Push every 2 hours PRN Agitation    Allergies    No Known Allergies    Intolerances      PAST MEDICAL & SURGICAL HISTORY:  Post traumatic stress disorder (PTSD)    Alcohol withdrawal seizure    Depression    Alcohol abuse    No significant past surgical history      FAMILY HISTORY:    SOCIAL HISTORY:  active smoker    Review of Systems:  Constitutional: No generalized weakness. No fevers or chills             Eyes, Ears, Mouth, Throat: No vision loss   Respiratory: No shortness of breath or cough                             Cardiovascular: No chest pain or palpitations  Gastrointestinal: No nausea or vomiting                                      Genitourinary: No urinary incontinence or burning on urination  Musculoskeletal: No joint pain                                                        Dermatologic: No rash  Neurological: as per HPI                                                                      Psychiatric: No behavioral problems  Endocrine: No known hypoglycemia            Hematologic/Lymphatic: No easy bleeding    O:  Vital Signs Last 24 Hrs  T(C): 37.4 (21 Oct 2020 15:04), Max: 37.4 (21 Oct 2020 15:04)  T(F): 99.4 (21 Oct 2020 15:04), Max: 99.4 (21 Oct 2020 15:04)  HR: 76 (21 Oct 2020 15:04) (76 - 142)  BP: 147/98 (21 Oct 2020 15:04) (127/74 - 157/80)  RR: 18 (21 Oct 2020 15:04) (15 - 22)  SpO2: 95% (21 Oct 2020 15:04) (93% - 99%)    General Exam:   General appearance: No acute distress                 Cardiovascular: Pedal dorsalis pulses intact bilaterally    Mental Status: Oriented to self, date and place.  Attention intact.  No dysarthria, aphasia or neglect.  Short and long term memory impaired.        Cranial Nerves: CN I - not tested.  PERRL, EOMI, VFF, no nystagmus or diplopia.  No APD.  Fundi not visualized.  CN V1-3 intact to light touch.  No facial asymmetry.  Hearing intact to finger rub bilaterally.  Tongue, uvula and palate midline.  Sternocleidomastoid and Trapezius intact bilaterally.    Motor:   Tone: Normal                  Strength intact throughout  No pronator drift bilaterally                      No dysmetria on finger-nose-finger or heel-shin-heel  No truncal ataxia.  No resting, postural.  Mild action tremor.  No myoclonus    Sensation: intact to light touch    Deep Tendon Reflexes: 2+ bilateral biceps, triceps, brachioradialis, knee and ankle  Toes flexor bilaterally    Gait: Stable.  Romberg (-)  Other:     LABS:                        12.9   10. )-----------( 270      ( 21 Oct 2020 06:52 )             38.4     10-21    143  |  110<H>  |  11  ----------------------------<  83  3.5   |  27  |  0.53    Ca    8.5      21 Oct 2020 06:52  Phos  3.1     10-21  Mg     1.9     10-21    TPro  6.6  /  Alb  3.4<L>  /  TBili  0.2  /  DBili  <0.1  /  AST  35  /  ALT  64<H>  /  AlkPhos  46  10-21      Urinalysis Basic - ( 21 Oct 2020 04:07 )    Color: Yellow / Appearance: Slightly Turbid / S.020 / pH: x  Gluc: x / Ketone: Negative  / Bili: Negative / Urobili: Negative   Blood: x / Protein: 15 / Nitrite: Negative   Leuk Esterase: Trace / RBC: 0-2 /HPF / WBC 3-5 /HPF   Sq Epi: x / Non Sq Epi: Few /HPF / Bacteria: Moderate /HPF          RADIOLOGY & ADDITIONAL STUDIES:  c< from: CT Head No Cont (10.21.20 @ 02:01) >  EXAM:  CT CERVICAL SPINE                          EXAM:  CT BRAIN                            PROCEDURE DATE:  10/21/2020          INTERPRETATION:  CLINICAL INFORMATION: Altered mental status.  Patient found unresponsive, outside in her lawn.  Past medical history of alcohol abuse.    TECHNIQUE:  1.  Noncontrast axial images were acquired through the head.  Sagittal and coronal reformats were generated.  2  Noncontrast axial images were acquired through the cervical spine.  Sagittal and coronal reformats were generated.    COMPARISON STUDY: CT head and cervical spine from 2020.    FINDINGS:  CT head:  There is no CT evidence of acute intracranial hemorrhage, mass effect, midline shift, or hydrocephalus.  There is no CT evidence of an acute or chronic infarct.    The ventricles, sulci and extra-axial spaces appear within normal limits.    The paranasal sinuses, mastoid air cells and middle ear cavities are grossly clear.    The calvarium, skull base and orbits appear within normal limits.      CT cervical spine:  There is preservation of the cervical lordosis.  There is no CT evidence of cervical spine fracture or traumatic malalignment.  There is no suspicious osseous lesion.  The paraspinous soft tissues appear within normal limits.    Degenerative changes:  No clinically significant degenerative changes, spinal canal stenosis or neuroforaminal narrowing is visualized by CT technique.    Incidental findings:  The adenoids are mildly enlarged.      IMPRESSION:  CT Head:  NoCT evidence of intracranial pathology.    CT Cervical Spine: No CT evidence of cervical spine fracture or traumatic malalignment.            MADDISON SPENCE M.D.,ATTENDING RADIOLOGIST  This document has been electronically signed. Oct 21 2020  2:13AM    < end of copied text >   Patient is a 35y old  Female who presents with a chief complaint of Unresponsiveness (21 Oct 2020 16:12)      HPI:  36 y/o F from home, AAOX3 baseline   with a PMHx of chronic alcohol abuse ( multiple admissions for alcohol intoxication) , alcohol withdrawal seizure, PTSD,  Depression was brought into the ED after  found her unresponsive in the lawn. Pt is AAOX2, mildly drowsy in ED, spouse Mr. Carter  [phone] was not reachable. All the collateral information was obtained from the ED provider and past records. Pt apparently has a known history of alcohol abuse but has been increasingly drinking ( tequila) over the past 5 days. When  returned from work he found the pt lying unresponsive in the lawn outside the house.  has not witnessed any seizures upon arrival but has strong suspicion as she was recently admitted at [facility] Sep 2020 for alcohol intoxication and Seizures. Pt had a Neurological work up back then and no Anti- convulsant medication was indicated on discharge. As per the ED note, Pt has also been displaying some suicidal thoughts earlier at home.       In the ED,   Pt is AAOX1, noted to be drowsy   Pt was noted to be agitated upon ED arrival, s/p Ativan 2mg, versed 2mg and haldol   Vitals- 135/80, -110   CIWA 3-4       Med rec reinstated from the Discharge medication list from SEP 2020  Primary team to confirm med list with pt when awake or with the .    (21 Oct 2020 00:15)    Pt remembers being in her garden waiting for her  to come home.  The next thing she remembers is being in the hospital.   at bedside states when he arrived home he found pt face down on the grass w/ dirt on her face.  When he woke her she got up and pulled down her pants and urinated on the front lawn.  Per  "when she drinks too much."   reports pt has been drinking a quart of tequila daily for the past 4 days.    denies shaking, tongue bite or inability to control urine or bowels.    Neurological Review of Systems:  No difficulty with language.  No vision loss or double vision.  No dizziness, vertigo or new hearing loss.  No difficulty with speech or swallowing.  No focal weakness.  No focal sensory changes.  No numbness or tingling in the bilateral lower extremities.  No difficulty with balance.  No difficulty with ambulation.        MEDICATIONS  (STANDING):  chlordiazePOXIDE 25 milliGRAM(s) Oral every 8 hours  citalopram 20 milliGRAM(s) Oral daily  enoxaparin Injectable 40 milliGRAM(s) SubCutaneous daily  ergocalciferol 83576 Unit(s) Oral <User Schedule>  folic acid 1 milliGRAM(s) Oral daily  multivitamin 1 Tablet(s) Oral daily  pantoprazole    Tablet 40 milliGRAM(s) Oral before breakfast  sodium chloride 0.9%. 1000 milliLiter(s) (80 mL/Hr) IV Continuous <Continuous>  thiamine IVPB 500 milliGRAM(s) IV Intermittent every 8 hours    MEDICATIONS  (PRN):  LORazepam   Injectable 2 milliGRAM(s) IV Push every 2 hours PRN Agitation    Allergies    No Known Allergies    Intolerances      PAST MEDICAL & SURGICAL HISTORY:  Post traumatic stress disorder (PTSD)    Alcohol withdrawal seizure    Depression    Alcohol abuse    No significant past surgical history      FAMILY HISTORY:    SOCIAL HISTORY:  active smoker    Review of Systems:  Constitutional: No generalized weakness. No fevers or chills             Eyes, Ears, Mouth, Throat: No vision loss   Respiratory: No shortness of breath or cough                             Cardiovascular: No chest pain or palpitations  Gastrointestinal: No nausea or vomiting                                      Genitourinary: No urinary incontinence or burning on urination  Musculoskeletal: No joint pain                                                        Dermatologic: No rash  Neurological: as per HPI                                                                      Psychiatric: No behavioral problems  Endocrine: No known hypoglycemia            Hematologic/Lymphatic: No easy bleeding    O:  Vital Signs Last 24 Hrs  T(C): 37.4 (21 Oct 2020 15:04), Max: 37.4 (21 Oct 2020 15:04)  T(F): 99.4 (21 Oct 2020 15:04), Max: 99.4 (21 Oct 2020 15:04)  HR: 76 (21 Oct 2020 15:04) (76 - 142)  BP: 147/98 (21 Oct 2020 15:04) (127/74 - 157/80)  RR: 18 (21 Oct 2020 15:04) (15 - 22)  SpO2: 95% (21 Oct 2020 15:04) (93% - 99%)    General Exam:   General appearance: No acute distress                 Cardiovascular: Pedal dorsalis pulses intact bilaterally    Mental Status: Oriented to self, date and place.  Attention intact.  No dysarthria, aphasia or neglect.  Short and long term memory impaired.        Cranial Nerves: CN I - not tested.  PERRL, EOMI, VFF, no nystagmus or diplopia.  No APD.  Fundi not visualized.  CN V1-3 intact to light touch.  No facial asymmetry.  Hearing intact to finger rub bilaterally.  Tongue, uvula and palate midline.  Sternocleidomastoid and Trapezius intact bilaterally.    Motor:   Tone: Normal                  Strength intact throughout  No pronator drift bilaterally                      No dysmetria on finger-nose-finger or heel-shin-heel  No truncal ataxia.  No resting, postural.  Mild action tremor.  No myoclonus    Sensation: intact to light touch    Deep Tendon Reflexes: 2+ bilateral biceps, triceps, brachioradialis, knee and ankle  Toes flexor bilaterally    Gait: Stable.  Romberg (-)  Other:     LABS:                        12.9   10. )-----------( 270      ( 21 Oct 2020 06:52 )             38.4     10-21    143  |  110<H>  |  11  ----------------------------<  83  3.5   |  27  |  0.53    Ca    8.5      21 Oct 2020 06:52  Phos  3.1     10-21  Mg     1.9     10-21    TPro  6.6  /  Alb  3.4<L>  /  TBili  0.2  /  DBili  <0.1  /  AST  35  /  ALT  64<H>  /  AlkPhos  46  10-21      Urinalysis Basic - ( 21 Oct 2020 04:07 )    Color: Yellow / Appearance: Slightly Turbid / S.020 / pH: x  Gluc: x / Ketone: Negative  / Bili: Negative / Urobili: Negative   Blood: x / Protein: 15 / Nitrite: Negative   Leuk Esterase: Trace / RBC: 0-2 /HPF / WBC 3-5 /HPF   Sq Epi: x / Non Sq Epi: Few /HPF / Bacteria: Moderate /HPF          RADIOLOGY & ADDITIONAL STUDIES:  c< from: CT Head No Cont (10.21.20 @ 02:01) >  EXAM:  CT CERVICAL SPINE                          EXAM:  CT BRAIN                            PROCEDURE DATE:  10/21/2020          INTERPRETATION:  CLINICAL INFORMATION: Altered mental status.  Patient found unresponsive, outside in her lawn.  Past medical history of alcohol abuse.    TECHNIQUE:  1.  Noncontrast axial images were acquired through the head.  Sagittal and coronal reformats were generated.  2  Noncontrast axial images were acquired through the cervical spine.  Sagittal and coronal reformats were generated.    COMPARISON STUDY: CT head and cervical spine from 2020.    FINDINGS:  CT head:  There is no CT evidence of acute intracranial hemorrhage, mass effect, midline shift, or hydrocephalus.  There is no CT evidence of an acute or chronic infarct.    The ventricles, sulci and extra-axial spaces appear within normal limits.    The paranasal sinuses, mastoid air cells and middle ear cavities are grossly clear.    The calvarium, skull base and orbits appear within normal limits.      CT cervical spine:  There is preservation of the cervical lordosis.  There is no CT evidence of cervical spine fracture or traumatic malalignment.  There is no suspicious osseous lesion.  The paraspinous soft tissues appear within normal limits.    Degenerative changes:  No clinically significant degenerative changes, spinal canal stenosis or neuroforaminal narrowing is visualized by CT technique.    Incidental findings:  The adenoids are mildly enlarged.      IMPRESSION:  CT Head:  NoCT evidence of intracranial pathology.    CT Cervical Spine: No CT evidence of cervical spine fracture or traumatic malalignment.            MADDISON SPENCE M.D.,ATTENDING RADIOLOGIST  This document has been electronically signed. Oct 21 2020  2:13AM    < end of copied text >   Patient is a 35y old  Female who presents with a chief complaint of Unresponsiveness (21 Oct 2020 16:12)      HPI:  36 y/o F from home, AAOX3 baseline   with a PMHx of chronic alcohol abuse ( multiple admissions for alcohol intoxication) , alcohol withdrawal seizure, PTSD,  Depression was brought into the ED after  found her unresponsive in the lawn. Pt is AAOX2, mildly drowsy in ED, spouse Mr. Carter  [phone] was not reachable. All the collateral information was obtained from the ED provider and past records. Pt apparently has a known history of alcohol abuse but has been increasingly drinking ( tequila) over the past 5 days. When  returned from work he found the pt lying unresponsive in the lawn outside the house.  has not witnessed any seizures upon arrival but has strong suspicion as she was recently admitted at [facility] Sep 2020 for alcohol intoxication and Seizures. Pt had a Neurological work up back then and no Anti- convulsant medication was indicated on discharge. As per the ED note, Pt has also been displaying some suicidal thoughts earlier at home.       In the ED,   Pt is AAOX1, noted to be drowsy   Pt was noted to be agitated upon ED arrival, s/p Ativan 2mg, versed 2mg and haldol   Vitals- 135/80, -110   CIWA 3-4       Med rec reinstated from the Discharge medication list from SEP 2020  Primary team to confirm med list with pt when awake or with the .    (21 Oct 2020 00:15)    Pt remembers being in her garden waiting for her  to come home.  The next thing she remembers is being in the hospital.   at bedside states when he arrived home he found pt face down on the grass w/ dirt on her face.  When he woke her she got up and pulled down her pants and urinated on the front lawn.  Per  "when she drinks too much."   reports pt has been drinking a quart of tequila daily for the past 4 days.    denies shaking, tongue bite or inability to control urine or bowels.    Neurological Review of Systems:  No difficulty with language.  No vision loss or double vision.  No dizziness, vertigo or new hearing loss.  No difficulty with speech or swallowing.  No focal weakness.  No focal sensory changes.  No numbness or tingling in the bilateral lower extremities.  No difficulty with balance.  No difficulty with ambulation.        MEDICATIONS  (STANDING):  chlordiazePOXIDE 25 milliGRAM(s) Oral every 8 hours  citalopram 20 milliGRAM(s) Oral daily  enoxaparin Injectable 40 milliGRAM(s) SubCutaneous daily  ergocalciferol 90425 Unit(s) Oral <User Schedule>  folic acid 1 milliGRAM(s) Oral daily  multivitamin 1 Tablet(s) Oral daily  pantoprazole    Tablet 40 milliGRAM(s) Oral before breakfast  sodium chloride 0.9%. 1000 milliLiter(s) (80 mL/Hr) IV Continuous <Continuous>  thiamine IVPB 500 milliGRAM(s) IV Intermittent every 8 hours    MEDICATIONS  (PRN):  LORazepam   Injectable 2 milliGRAM(s) IV Push every 2 hours PRN Agitation    Allergies  No Known Allergies      PAST MEDICAL & SURGICAL HISTORY:  Post traumatic stress disorder (PTSD)  Alcohol withdrawal seizure  Depression  Alcohol abuse  No significant past surgical history      FAMILY HISTORY: No reported family history of epilepsy    SOCIAL HISTORY:  active smoker    Review of Systems:  Constitutional: No generalized weakness. No fevers or chills             Eyes, Ears, Mouth, Throat: No vision loss   Respiratory: No shortness of breath or cough                             Cardiovascular: No chest pain or palpitations  Gastrointestinal: No nausea or vomiting                                      Genitourinary: No urinary incontinence or burning on urination  Musculoskeletal: No joint pain                                                        Dermatologic: No rash  Neurological: as per HPI                                                                      Psychiatric: No behavioral problems  Endocrine: No known hypoglycemia            Hematologic/Lymphatic: No easy bleeding      O:  Vital Signs Last 24 Hrs  T(C): 37.4 (21 Oct 2020 15:04), Max: 37.4 (21 Oct 2020 15:04)  T(F): 99.4 (21 Oct 2020 15:04), Max: 99.4 (21 Oct 2020 15:04)  HR: 76 (21 Oct 2020 15:04) (76 - 142)  BP: 147/98 (21 Oct 2020 15:04) (127/74 - 157/80)  RR: 18 (21 Oct 2020 15:04) (15 - 22)  SpO2: 95% (21 Oct 2020 15:04) (93% - 99%)    General Exam:   General appearance: No acute distress                 Cardiovascular: Pedal dorsalis pulses intact bilaterally    Mental Status: Oriented to self, date and place.  Attention intact.  No dysarthria, aphasia or neglect.  Short and long term memory impaired.        Cranial Nerves: CN I - not tested.  PERRL, EOMI, VFF, no nystagmus or diplopia.  No APD.  Fundi not visualized.  CN V1-3 intact to light touch.  No facial asymmetry.  Hearing intact to finger rub bilaterally.  Tongue, uvula and palate midline.  Sternocleidomastoid and Trapezius intact bilaterally.    Motor:   Tone: Normal                  Strength intact throughout  No pronator drift bilaterally                      No dysmetria on finger-nose-finger or heel-shin-heel  No truncal ataxia.  No resting, postural.  Mild action tremor.  No myoclonus    Sensation: intact to light touch    Deep Tendon Reflexes: 2+ bilateral biceps, triceps, brachioradialis, knee and ankle  Toes flexor bilaterally    Gait: Stable.  Romberg (-)      Other:     LABS:                        12.9   10. )-----------( 270      ( 21 Oct 2020 06:52 )             38.4     10-21    143  |  110<H>  |  11  ----------------------------<  83  3.5   |  27  |  0.53    Ca    8.5      21 Oct 2020 06:52  Phos  3.1     10-  Mg     1.9     10-21    TPro  6.6  /  Alb  3.4<L>  /  TBili  0.2  /  DBili  <0.1  /  AST  35  /  ALT  64<H>  /  AlkPhos  46  10-21      Urinalysis Basic - ( 21 Oct 2020 04:07 )    Color: Yellow / Appearance: Slightly Turbid / S.020 / pH: x  Gluc: x / Ketone: Negative  / Bili: Negative / Urobili: Negative   Blood: x / Protein: 15 / Nitrite: Negative   Leuk Esterase: Trace / RBC: 0-2 /HPF / WBC 3-5 /HPF   Sq Epi: x / Non Sq Epi: Few /HPF / Bacteria: Moderate /HPF          RADIOLOGY & ADDITIONAL STUDIES:  c< from: CT Head No Cont (10.21.20 @ 02:01) >  EXAM:  CT CERVICAL SPINE                          EXAM:  CT BRAIN                            PROCEDURE DATE:  10/21/2020          INTERPRETATION:  CLINICAL INFORMATION: Altered mental status.  Patient found unresponsive, outside in her lawn.  Past medical history of alcohol abuse.    TECHNIQUE:  1.  Noncontrast axial images were acquired through the head.  Sagittal and coronal reformats were generated.  2  Noncontrast axial images were acquired through the cervical spine.  Sagittal and coronal reformats were generated.    COMPARISON STUDY: CT head and cervical spine from 2020.    FINDINGS:  CT head:  There is no CT evidence of acute intracranial hemorrhage, mass effect, midline shift, or hydrocephalus.  There is no CT evidence of an acute or chronic infarct.    The ventricles, sulci and extra-axial spaces appear within normal limits.    The paranasal sinuses, mastoid air cells and middle ear cavities are grossly clear.    The calvarium, skull base and orbits appear within normal limits.      CT cervical spine:  There is preservation of the cervical lordosis.  There is no CT evidence of cervical spine fracture or traumatic malalignment.  There is no suspicious osseous lesion.  The paraspinous soft tissues appear within normal limits.    Degenerative changes:  No clinically significant degenerative changes, spinal canal stenosis or neuroforaminal narrowing is visualized by CT technique.    Incidental findings:  The adenoids are mildly enlarged.      IMPRESSION:  CT Head:  NoCT evidence of intracranial pathology.    CT Cervical Spine: No CT evidence of cervical spine fracture or traumatic malalignment.            MADIDSON SPENCE M.D.,ATTENDING RADIOLOGIST  This document has been electronically signed. Oct 21 2020  2:13AM    < end of copied text >

## 2020-10-21 NOTE — H&P ADULT - PROBLEM SELECTOR PLAN 3
Pt has PMH of Depression and PTSD   As per , Pt has been displaying some suicidal thoughts recently   Pt might benefit from Inpatient Psych evaluation   Home meds- Citalopram  c/w home medication

## 2020-10-21 NOTE — H&P ADULT - PROBLEM SELECTOR PLAN 5
IMPROVE VTE Individual Risk Assessment    RISK                                                          Points  [] Previous VTE                                           3  [] Thrombophilia                                        2  [] Lower limb paralysis                              2   [] Current Cancer                                       2   [] Immobilization > 24 hrs                        1  [x] ICU/CCU stay > 24 hours                       1  [] Age > 60                                                   1    IMPROVE VTE Score: 1  ppi   heparin

## 2020-10-21 NOTE — ED CLERICAL - CLERICAL COMMENTS
Received COVID[-] result from RN Manager Sonia. Room 519C endorsed to WALDO Coyne @ 00:32. Received COVID[-] result from RN Manager Sonia. Room 519C endorsed to WALDO Coyne @ 05:47. Received COVID[-] result from RN Manager Sonia. Room 519C endorsed to WALDO Coyne @ 05:49.

## 2020-10-21 NOTE — PROGRESS NOTE ADULT - PROBLEM SELECTOR PLAN 1
- h/o chronic EtOH abuse;   - found unresponsive in the lawn at 4.00pm 10/20/20  - Pt has PMH of ETOH abuse and has been heavily drinking ( tequila) x 5 days.    Pt was noted to agitated and anxious upon arrival, s/p ativan, versed and haldol   Pt was noted to be very drowsy, AAO1 on my encounter  Symptoms likely due to Alc intoxication   CIWA- 3-4  CIWA protocol initiated, ativan standing and PRN  started on MVT, Thiamine and Folic acid  f/u SW consult for alc cessation - h/o chronic EtOH abuse  - found unresponsive in the lawn at 4.00pm 10/20/20  - last drink likely on 10/22, x5d  -   - s/p ativan 2mg, versed 2mg and haldol 5mg in ED  - s/p thiamine, folic acid, MVT  - c/w ativan 2mg q4h standing and 2mg q2h PRN  - [facility] protocol    -  consulted - h/o chronic EtOH abuse  - found unresponsive in the lawn at 1600 10/20/20  - last drink likely on 10/20, x5d  -   - s/p ativan 2mg, versed 2mg and haldol 5mg in ED  - c/w thiamine, folic acid, MVT, ativan 2mg q4h standing and 2mg q2h PRN  - CIWA protocol    -  consulted - h/o chronic EtOH abuse  - found unresponsive in the lawn at 1600 10/20/20  - last drink likely on 10/20, x5d  -   - s/p ativan 2mg, versed 2mg and haldol 5mg in ED  - c/w thiamine, folic acid, MVT, librium 25mg q8h standing, and ativan 2mg q2h PRN  - [facility] protocol    -  consulted

## 2020-10-21 NOTE — BEHAVIORAL HEALTH ASSESSMENT NOTE - SUICIDE PROTECTIVE FACTORS
Has future plans/Responsibility to family and others/Cultural, spiritual and/or moral attitudes against suicide/Supportive social network of family or friends/Engaged in work or school/Positive therapeutic relationships/Identifies reasons for living

## 2020-10-22 VITALS
DIASTOLIC BLOOD PRESSURE: 81 MMHG | OXYGEN SATURATION: 99 % | SYSTOLIC BLOOD PRESSURE: 143 MMHG | RESPIRATION RATE: 18 BRPM | TEMPERATURE: 98 F | HEART RATE: 73 BPM

## 2020-10-22 DIAGNOSIS — R03.0 ELEVATED BLOOD-PRESSURE READING, WITHOUT DIAGNOSIS OF HYPERTENSION: ICD-10-CM

## 2020-10-22 LAB
ALBUMIN SERPL ELPH-MCNC: 3 G/DL — LOW (ref 3.5–5)
ALP SERPL-CCNC: 50 U/L — SIGNIFICANT CHANGE UP (ref 40–120)
ALT FLD-CCNC: 64 U/L DA — HIGH (ref 10–60)
ANION GAP SERPL CALC-SCNC: 6 MMOL/L — SIGNIFICANT CHANGE UP (ref 5–17)
AST SERPL-CCNC: 46 U/L — HIGH (ref 10–40)
BILIRUB DIRECT SERPL-MCNC: 0.1 MG/DL — SIGNIFICANT CHANGE UP (ref 0–0.2)
BILIRUB INDIRECT FLD-MCNC: 0.6 MG/DL — SIGNIFICANT CHANGE UP (ref 0.2–1)
BILIRUB SERPL-MCNC: 0.7 MG/DL — SIGNIFICANT CHANGE UP (ref 0.2–1.2)
BUN SERPL-MCNC: 9 MG/DL — SIGNIFICANT CHANGE UP (ref 7–18)
CALCIUM SERPL-MCNC: 8.8 MG/DL — SIGNIFICANT CHANGE UP (ref 8.4–10.5)
CHLORIDE SERPL-SCNC: 106 MMOL/L — SIGNIFICANT CHANGE UP (ref 96–108)
CHOLEST SERPL-MCNC: 263 MG/DL — HIGH
CO2 SERPL-SCNC: 27 MMOL/L — SIGNIFICANT CHANGE UP (ref 22–31)
CREAT SERPL-MCNC: 0.52 MG/DL — SIGNIFICANT CHANGE UP (ref 0.5–1.3)
GLUCOSE SERPL-MCNC: 89 MG/DL — SIGNIFICANT CHANGE UP (ref 70–99)
HDLC SERPL-MCNC: 51 MG/DL — SIGNIFICANT CHANGE UP
LIPID PNL WITH DIRECT LDL SERPL: 171 MG/DL — HIGH
MAGNESIUM SERPL-MCNC: 1.8 MG/DL — SIGNIFICANT CHANGE UP (ref 1.6–2.6)
NON HDL CHOLESTEROL: 212 MG/DL — HIGH
PHOSPHATE SERPL-MCNC: 3.3 MG/DL — SIGNIFICANT CHANGE UP (ref 2.5–4.5)
POTASSIUM SERPL-MCNC: 3.9 MMOL/L — SIGNIFICANT CHANGE UP (ref 3.5–5.3)
POTASSIUM SERPL-SCNC: 3.9 MMOL/L — SIGNIFICANT CHANGE UP (ref 3.5–5.3)
PROT SERPL-MCNC: 6.5 G/DL — SIGNIFICANT CHANGE UP (ref 6–8.3)
SODIUM SERPL-SCNC: 139 MMOL/L — SIGNIFICANT CHANGE UP (ref 135–145)
TRIGL SERPL-MCNC: 207 MG/DL — HIGH

## 2020-10-22 PROCEDURE — 99239 HOSP IP/OBS DSCHRG MGMT >30: CPT | Mod: GC

## 2020-10-22 RX ORDER — ERGOCALCIFEROL 1.25 MG/1
1 CAPSULE ORAL
Qty: 7 | Refills: 0
Start: 2020-10-22 | End: 2020-10-28

## 2020-10-22 RX ORDER — POTASSIUM CHLORIDE 20 MEQ
40 PACKET (EA) ORAL EVERY 4 HOURS
Refills: 0 | Status: DISCONTINUED | OUTPATIENT
Start: 2020-10-22 | End: 2020-10-22

## 2020-10-22 RX ORDER — PANTOPRAZOLE SODIUM 20 MG/1
1 TABLET, DELAYED RELEASE ORAL
Qty: 30 | Refills: 0
Start: 2020-10-22 | End: 2020-11-20

## 2020-10-22 RX ORDER — MAGNESIUM SULFATE 500 MG/ML
1 VIAL (ML) INJECTION ONCE
Refills: 0 | Status: COMPLETED | OUTPATIENT
Start: 2020-10-22 | End: 2020-10-22

## 2020-10-22 RX ORDER — HYDROCHLOROTHIAZIDE 25 MG
12.5 TABLET ORAL ONCE
Refills: 0 | Status: COMPLETED | OUTPATIENT
Start: 2020-10-22 | End: 2020-10-22

## 2020-10-22 RX ADMIN — CITALOPRAM 20 MILLIGRAM(S): 10 TABLET, FILM COATED ORAL at 12:07

## 2020-10-22 RX ADMIN — Medication 1 MILLIGRAM(S): at 12:07

## 2020-10-22 RX ADMIN — Medication 25 MILLIGRAM(S): at 05:47

## 2020-10-22 RX ADMIN — Medication 1 TABLET(S): at 12:07

## 2020-10-22 RX ADMIN — Medication 105 MILLIGRAM(S): at 05:47

## 2020-10-22 RX ADMIN — Medication 12.5 MILLIGRAM(S): at 12:07

## 2020-10-22 RX ADMIN — Medication 100 GRAM(S): at 12:07

## 2020-10-22 RX ADMIN — PANTOPRAZOLE SODIUM 40 MILLIGRAM(S): 20 TABLET, DELAYED RELEASE ORAL at 05:47

## 2020-10-22 RX ADMIN — ENOXAPARIN SODIUM 40 MILLIGRAM(S): 100 INJECTION SUBCUTANEOUS at 12:07

## 2020-10-22 RX ADMIN — Medication 25 MILLIGRAM(S): at 13:20

## 2020-10-22 NOTE — PROGRESS NOTE ADULT - PROBLEM SELECTOR PLAN 3
- no h/o HTN  - noted to have -150's  - unlikely 2/2 alcohol withdrawal syndrome  - c/w hydrochlorothiazide 12.5mg qd

## 2020-10-22 NOTE — DISCHARGE NOTE PROVIDER - NSDCCPCAREPLAN_GEN_ALL_CORE_FT
PRINCIPAL DISCHARGE DIAGNOSIS  Diagnosis: Alcohol withdrawal  Assessment and Plan of Treatment: You presented with acute alcoholic intoxication and admitted for possible alcohol withdrawal syndrome  - Please watch for      SECONDARY DISCHARGE DIAGNOSES  Diagnosis: Depression  Assessment and Plan of Treatment: Depression    Diagnosis: Post traumatic stress disorder (PTSD)  Assessment and Plan of Treatment: Post traumatic stress disorder (PTSD)    Diagnosis: Elevated blood pressure reading  Assessment and Plan of Treatment: Elevated blood pressure reading     PRINCIPAL DISCHARGE DIAGNOSIS  Diagnosis: Alcohol withdrawal  Assessment and Plan of Treatment: You presented with acute alcoholic intoxication and admitted for possible alcohol withdrawal syndrome  - Please watch for signs and symptoms of alcohol withdrawal syndrome, including but not limited to anxiety, insomnia, tremors, diaphoresis, palpitations, GI upset, hallucination (visual, auditory, tactile).  If any of this symptoms occur, be sure to visit your nearest ER.  - Please continue to try to minimize your alcohol intake and follow education given during the visit.  - Please continue to take all the medications, including vitamins, perscribed during the visit.        SECONDARY DISCHARGE DIAGNOSES  Diagnosis: Depression  Assessment and Plan of Treatment: You have a history of Depression  - Please be sure to be compliant with your medications.   - If you notice worsening Depression, be sure to follow up with your primary care physican or psychiatrist.   - If you have suicidal ideation, be sure to visit the nearest emergency department.      Diagnosis: Post traumatic stress disorder (PTSD)  Assessment and Plan of Treatment: You have a history of Post-traumatic stress disorder  - Please be sure to be compliant with your medications.   - If you notice worsening symptoms, be sure to follow up with your primary care physican or psychiatrist.   - If you have suicidal ideation, be sure to visit the nearest emergency department.    Diagnosis: Elevated blood pressure reading  Assessment and Plan of Treatment: You were noted to have elevated blood pressure  - Please follow up with your primary care physician in a week to see if you meet a criteria for the diagnosis of hypertension.  - You blood pressure should be within 140-120/80-90.  - Notify your doctor if you have any of the following symptoms:   (Dizziness, Lightheadedness, Blurry vision, Headache, Chest pain, Shortness of breath.)  - You should maintain healthy lifestyle by eating healthy low salt diet, avoid fatty food, weight loss, exercise regularly as tolerated 30 mins X 3 time per week.       PRINCIPAL DISCHARGE DIAGNOSIS  Diagnosis: Alcohol withdrawal  Assessment and Plan of Treatment: You presented with acute alcoholic intoxication and were monitored for signs of withdrawal. You did not show signs or symptoms of withdrawal during this admission. You were started on librium to help decrease withdrawals symptoms. You will be discharge on tapering dose of librium. Please take 25 mg at 6pm 10/22, then use the decrease dose 10 mg twice a day on 10/23. complete the course by taking 10 mg x 2 days till 10/25.   - Please watch for signs and symptoms of alcohol withdrawal syndrome, including but not limited to anxiety, insomnia, tremors, diaphoresis, palpitations, GI upset, hallucination (visual, auditory, tactile).  If any of this symptoms occur, be sure to visit your nearest ER.  - Please avoid alcohol while you are on librium as combination can cause severe sedation causing respiratory depression and possible arrest. Please follow education given during the visit.  - Please continue to take all the medications, including vitamins, perscribed during the visit.        SECONDARY DISCHARGE DIAGNOSES  Diagnosis: Depression  Assessment and Plan of Treatment: You have a history of Depression  - Please be sure to be compliant with your medications.   - If you notice worsening Depression, be sure to follow up with your primary care physican or psychiatrist.   - If you have suicidal ideation, be sure to visit the nearest emergency department.      Diagnosis: Post traumatic stress disorder (PTSD)  Assessment and Plan of Treatment: You have a history of Post-traumatic stress disorder  - Please be sure to be compliant with your medications.   - If you notice worsening symptoms, be sure to follow up with your primary care physican or psychiatrist.   - If you have suicidal ideation, be sure to visit the nearest emergency department.    Diagnosis: Elevated blood pressure reading  Assessment and Plan of Treatment: You were noted to have elevated blood pressure  - Please follow up with your primary care physician in a week to see if you meet a criteria for the diagnosis of hypertension.  - You blood pressure should be within 140-120/80-90.  - Notify your doctor if you have any of the following symptoms:   (Dizziness, Lightheadedness, Blurry vision, Headache, Chest pain, Shortness of breath.)  - You should maintain healthy lifestyle by eating healthy low salt diet, avoid fatty food, weight loss, exercise regularly as tolerated 30 mins X 3 time per week.

## 2020-10-22 NOTE — DISCHARGE NOTE NURSING/CASE MANAGEMENT/SOCIAL WORK - NSDCFUADDAPPT_GEN_ALL_CORE_FT
As per AM rounds patient ready for dc home to  via car. Patient connected to care in community, endorsed she will be going to etoh rehab in december (once job schedule permits it) with referral from her private psychiatrist. Meds sent to community pharmacy. Patient and med team aware of and agree with plan.

## 2020-10-22 NOTE — DISCHARGE NOTE PROVIDER - HOSPITAL COURSE
Patient is a 35 year old female, from home, AAOX3 at baseline, w/ PMHx of chronic alcohol abuse (h/o multiple admissions for alcohol intoxication), alcohol withdrawal seizure, PTSD, and Depression, presented after found unresponsive in the lawn. Admitted for alcohol withdrawal syndrome.          Problem/Plan - 1:  ·  Problem: Alcohol intoxication.  Plan: - h/o chronic EtOH abuse  - found unresponsive in the lawn at 1600 10/20/20  - last drink likely on 10/20, x5d  -   - s/p ativan 2mg, versed 2mg and haldol 5mg in ED  - c/w thiamine, folic acid, MVT, librium 25mg q8h standing, and ativan 2mg q2h PRN  - CIWA protocol; CIWA 0 (10/22)    - SW consulted.      Problem/Plan - 2:  ·  Problem: Alcohol withdrawal seizure.  Plan: - h/o EtOH withdrawal seizures; last admission 09/2020  - p/w unwitnessed fall; unlikely seizure this admission  - CT head neg  - s/p ativan 2mg, versed 2mg and haldol 5mg in ED  - c/w thiamine, folic acid, MVT, librium 25mg q8h standing, and ativan 2mg q2h PRN  - CIWA protocol; CIWA 0 (10/22)    - SW consulted.      Problem/Plan - 3:  ·  Problem: Elevated blood pressure reading.  Plan: - no h/o HTN  - noted to have -150's  - unlikely 2/2 alcohol withdrawal syndrome  - c/w hydrochlorothiazide 12.5mg qd.      Problem/Plan - 4:  ·  Problem: Depression.  Plan: - h/o Depression and PTSD   - per her , pt has displayed suicidal thoughts recently   - on citalopram at home  - c/w citalopram 25mg qd  - will consider psych consult.      Problem/Plan - 5:  ·  Problem: Post traumatic stress disorder (PTSD).  Plan: - h/o PTSD  - on citalopram at home   - c/w citalopram 25mg qd.      Problem/Plan - 6:  Problem: Prophylactic measure. Plan: IMPROVE VTE Individual Risk Assessment    RISK                                                          Points  [] Previous VTE                                           3  [] Thrombophilia                                        2  [] Lower limb paralysis                              2   [] Current Cancer                                       2   [] Immobilization > 24 hrs                        1  [x] ICU/CCU stay > 24 hours                       1  [] Age > 60                                                   1    IMPROVE VTE Score: 1  ppi   heparin.   Patient is a 35 year old female, from home, AAOX3 at baseline, w/ PMHx of chronic alcohol abuse (h/o multiple admissions for alcohol intoxication), alcohol withdrawal seizure, PTSD, and Depression, presented after found unresponsive in the lawn. Admitted for alcohol withdrawal syndrome. CTH showed no signs of acute hemorrhage. Blood alcohol level were 268, and Patient received ativan 2mg, Versed 2mg, and Haldol 5mg in ED. Patient was cared with thiamine, folic acid, multivitamin, Librium 25mg q8h, and ativan 2mg as needed. CIWA protocol was followed during the stay.    Patient noted to have elevated systolic blood pressure of 140-150's, but no other autonomic dysfunctions were noted. Patient was treated with hydrochlorothiazide 12.5mg.    Patient has a history of Depression, on Citalopram. Patient was treated with Citalopram 25mg.     Patient has a history of Post-traumatic stress disorder, on Citalopram at home. Patient was treated with Citalopram 25mg.         Patient is a 35 year old female, from home, AAOX3 at baseline, w/ PMHx of chronic alcohol abuse (h/o multiple admissions for alcohol intoxication), alcohol withdrawal seizure, PTSD, and Depression, presented after found unresponsive in the lawn. Admitted for alcohol withdrawal syndrome. CTH showed no signs of acute hemorrhage. Blood alcohol level were 268, and Patient received ativan 2mg, Versed 2mg, and Haldol 5mg in ED. Patient was cared with thiamine, folic acid, multivitamin, Librium 25mg q8h, and ativan 2mg as needed. CIWA protocol was followed during the stay.    Patient noted to have elevated systolic blood pressure of 140-150's, but no other autonomic dysfunctions were noted. Patient was treated with hydrochlorothiazide 12.5mg.    Patient has a history of Depression, on Citalopram, home medication were continued. Patient's mood remained stable and there was no indication to consult psych on this admission    Patient will be discharged on tapering dose of librium. Discussed in details about risks and consequences of using alcohol with librium. Patient was advised to not drink at all while being on librium. patient was provided with alcohol cessation counseling.     Please refer to patient's complete medical chart with documents for a full hospital course, for this is only a brief summary.

## 2020-10-22 NOTE — DISCHARGE NOTE NURSING/CASE MANAGEMENT/SOCIAL WORK - NSPROEXTENSIONSOFSELF_GEN_A_NUR
Outpatient Behavioral Health Progress Note    Date: 1/18/2017   Time Session Began:  1000  Ended:  1050    Session Type:  Follow-up (67269)    Others Present:  None    Intervention:  Cognitive behavioral    Patient Level of Functioning:  Improved    Suicide/Homicide/Violence Ideation:  No      Change in Medication(s) Reported:   Client states she is now on Adderall    Patient/Family Education Provided: Yes  Patient/Family Displays Understanding: Yes    Need for Community Resources Assessed: Yes  Resources Needed:  No    Primary Diagnosis with Code:   F43.10 (PTSD)    Treatment Plan:  Individual therapy; psychiatric treatment with Dr. Ibrahim.    Discharge Plan: Strategies discussed to maintain gains.    Next Appointment:   2/1/2017    Chief Complaint:    Relationship with .    Progress Note    Data:   Client presents with anxious mood and full range affect.  She reports continued overall decreased intrusive images of her abuse, two nightmares since last seen, increased sleep, increased energy, decreased anxiety, improved memory and focus, decreased depressed mood, and better completing of daily tasks.  She feels that since starting the Adderall her focus and concentration has been better, and that she is completing more tasks.  She reports her  has been working a great deal and they have had limited time with each other.  She also reports that she feels he has not been attentive enough to her.  She states she has talked with him about this.  Praised client for positive changes made.    Action:  Intervened as indicated above.  Provided supportive counseling.    Response:   Client with continued overall improvement since treatment began as indicated above.     Plan:  Patient will follow up with CATRACHITA Fierro as needed.  Patient will also follow up with the undersigned in two weeks.    Rodriguez Camacho LCSW  
none

## 2020-10-22 NOTE — DISCHARGE NOTE PROVIDER - CARE PROVIDER_API CALL
CONRADO CASTELLON  Internal Medicine  [address] [place] [address]  Phone: [phone]  Fax: [phone]  Follow Up Time:

## 2020-10-22 NOTE — PROGRESS NOTE ADULT - PROBLEM SELECTOR PLAN 4
- h/o Depression and PTSD   - per her , pt has displayed suicidal thoughts recently   - on citalopram at home  - c/w citalopram 25mg qd  - will consider psych consult

## 2020-10-22 NOTE — PROGRESS NOTE ADULT - ATTENDING COMMENTS
Patient seen and examined this morning with medical team. She is a 34 y/o female with a PMH of alcohol abuse and multiple admissions for alcohol intoxication, withdrawal seizures, PTSD, and Depression who presented after an unresponsive episode in the setting of acute alcohol intoxication. Suspect the unresponsive episode was due to acute intoxication, patient admits to continue alcohol use, Etoh level elevated on admission, no signs of withdrawals >24 hours, has not required prn Ativan. Was started on a Librium taper with good response. Patient expressing she is interested in a rehab program but not until December when she will have time off from work. She was seen by psychiatry who recommended to continue her Celexa and follow up with her outpatient provider. Also seen by social work. Patient expressed desire to go home today, not demonstrating any signs of withdrawals, is hemodynamically stable, will discharge home with continued libirum taper, thiamine, and folic acid. Patient educated on need to monitor her withdrawal signs and if she does have withdrawal symptoms despite being on the librium to return to the ED, however if she start drinking again to stop Librium use. Also seen by neurology who recommended to hold off AED at this time.    Rest as per resident's note.
Patient admitted overnight for an unresponsive episode. She was seen and examined this morning. Plan of care discussed with medical team. She is a 34 y/o female with a PMH of alcohol abuse and multiple admissions for alcohol intoxication, withdrawal seizures, PTSD, and Depression who presented after an unresponsive episode in the setting of acute alcohol intoxication. Suspect the unresponsive episode was due to acute intoxication, patient admits to continue alcohol use, Etoh level elevated on admission, no signs of withdrawals at this time however is high risk, will start on low dose Librium 25mg q8h and continue with CIWA monitoring and prn Ativan, DC standing ativan. Currently declining any rehab services regarding her etoh use. She was seen by psychiatry who recommended to continue her Celexa and follow up with her outpatient provider. Also seen by neurology who recommended to hold off AED at this time. Will monitor overnight, continue thiamine and folic acid, if no signs of withdrawals, and continues to decline rehab services can likely discharge home in 1-2 days.    Rest as per resident's note.

## 2020-10-22 NOTE — PROGRESS NOTE ADULT - SUBJECTIVE AND OBJECTIVE BOX
PGY-1 Progress Note discussed with attending    PAGER #: [[phone]] TILL 5:00 PM  PLEASE CONTACT ON CALL TEAM:  - On Call Team (Please refer to Justina) FROM 5:00 PM - 8:30PM  - Nightfloat Team FROM 8:30 -7:30 AM    INTERVAL HPI/OVERNIGHT EVENTS:   - No acute events overnight. Patient expresses frustration towards her  for continue to calling an ambulance and relay false information, such as "she has a suicidal ideation." She denies any suicidal ideation or harmful thoughts. She admits she drinks a lot, but she states she is very functional. She denies, N/V, tremor, fever, chills, chest pain or sob.    REVIEW OF SYSTEMS:  CONSTITUTIONAL: No fever, weight loss, or fatigue  RESPIRATORY: No cough, wheezing, chills or hemoptysis; No shortness of breath  CARDIOVASCULAR: No chest pain, palpitations, dizziness, or leg swelling  GASTROINTESTINAL: No abdominal pain. No nausea, vomiting, or hematemesis; No diarrhea or constipation. No melena or hematochezia.  GENITOURINARY: No dysuria or hematuria, urinary frequency  NEUROLOGICAL: No headaches, memory loss, loss of strength, numbness, or tremors     MEDICATIONS  (STANDING):  chlordiazePOXIDE 25 milliGRAM(s) Oral every 8 hours  citalopram 20 milliGRAM(s) Oral daily  enoxaparin Injectable 40 milliGRAM(s) SubCutaneous daily  ergocalciferol 48670 Unit(s) Oral <User Schedule>  folic acid 1 milliGRAM(s) Oral daily  hydrochlorothiazide 12.5 milliGRAM(s) Oral once  magnesium sulfate  IVPB 1 Gram(s) IV Intermittent once  multivitamin 1 Tablet(s) Oral daily  pantoprazole    Tablet 40 milliGRAM(s) Oral before breakfast  potassium chloride    Tablet ER 40 milliEquivalent(s) Oral every 4 hours  sodium chloride 0.9%. 1000 milliLiter(s) (80 mL/Hr) IV Continuous <Continuous>  thiamine IVPB 500 milliGRAM(s) IV Intermittent every 8 hours    MEDICATIONS  (PRN):  LORazepam   Injectable 2 milliGRAM(s) IV Push every 2 hours PRN Agitation      Vital Signs Last 24 Hrs  T(C): 36.5 (22 Oct 2020 05:11), Max: 37.4 (21 Oct 2020 15:04)  T(F): 97.7 (22 Oct 2020 05:11), Max: 99.4 (21 Oct 2020 15:04)  HR: 70 (22 Oct 2020 05:11) (68 - 97)  BP: 155/96 (22 Oct 2020 05:11) (142/87 - 155/96)  BP(mean): --  RR: 18 (22 Oct 2020 05:11) (16 - 18)  SpO2: 100% (22 Oct 2020 05:11) (95% - 100%)    PHYSICAL EXAMINATION:  GENERAL: sad, NAD, AAOx3  HEAD: AT/NC  EYES: conjunctiva and sclera clear  NECK: supple, No JVD noted, Normal thyroid  CHEST/LUNG: CTABL; no rales, rhonchi, wheezing, or rubs  HEART: regular rate and rhythm; no murmurs, rubs, or gallops  ABDOMEN: soft, nontender, nondistended; Bowel sounds present  EXTREMITIES:  2+ Peripheral Pulses, No clubbing, cyanosis, or edema                          12.9   10.24 )-----------( 270      ( 21 Oct 2020 06:52 )             38.4     10-22    139  |  106  |  9   ----------------------------<  89  3.9   |  27  |  0.52    Ca    8.8      22 Oct 2020 05:56  Phos  3.3     10-22  Mg     1.8     10-22    TPro  6.5  /  Alb  3.0<L>  /  TBili  0.7  /  DBili  0.1  /  AST  46<H>  /  ALT  64<H>  /  AlkPhos  50  10-22    LIVER FUNCTIONS - ( 22 Oct 2020 05:56 )  Alb: 3.0 g/dL / Pro: 6.5 g/dL / ALK PHOS: 50 U/L / ALT: 64 U/L DA / AST: 46 U/L / GGT: x                 COVID-19 PCR: NotDetec (20 Oct 2020 21:57)      CAPILLARY BLOOD GLUCOSE          RADIOLOGY & ADDITIONAL TESTS:

## 2020-10-22 NOTE — PROGRESS NOTE ADULT - ASSESSMENT
a 35 y/oF, from home, AAOX3 baseline, w/ PMHx of chronic alcohol abuse (h/o multiple admissions for alcohol intoxication), alcohol withdrawal seizure, PTSD, and Depression, presented after found unresponsive in the lawn. Admitted for alcohol withdrawal syndrome.

## 2020-10-22 NOTE — DISCHARGE NOTE PROVIDER - NSDCMRMEDTOKEN_GEN_ALL_CORE_FT
CITALOPRAM HBR 20 MG TABLET: TAKE 1 TABLET BY MOUTH EVERY DAY  ergocalciferol 50,000 intl units (1.25 mg) oral capsule: 1 cap(s) orally once a week every thursday   folic acid 1 mg oral tablet: 1 tab(s) orally once a day  Multiple Vitamins oral tablet: 1 tab(s) orally once a day  thiamine 100 mg oral tablet: 1 tab(s) orally once a day   chlordiazePOXIDE 10 mg oral capsule: 1 cap(s) orally 2 times a day x 1 day  1 cap(s) orally once a day for 2 days MDD:20 mg maximum dose  chlordiazePOXIDE 25 mg oral capsule: take only 10/22 MDD:25 mg maximum dose  CITALOPRAM HBR 20 MG TABLET: TAKE 1 TABLET BY MOUTH EVERY DAY  ergocalciferol 50,000 intl units (1.25 mg) oral capsule: 1 cap(s) orally once a week every thursday   folic acid 1 mg oral tablet: 1 tab(s) orally once a day  Multiple Vitamins oral tablet: 1 tab(s) orally once a day  pantoprazole 40 mg oral delayed release tablet: 1 tab(s) orally once a day (before a meal)  thiamine 100 mg oral tablet: 1 tab(s) orally once a day

## 2020-10-22 NOTE — PROGRESS NOTE ADULT - PROBLEM SELECTOR PLAN 1
- h/o chronic EtOH abuse  - found unresponsive in the lawn at 1600 10/20/20  - last drink likely on 10/20, x5d  -   - s/p ativan 2mg, versed 2mg and haldol 5mg in ED  - c/w thiamine, folic acid, MVT, librium 25mg q8h standing, and ativan 2mg q2h PRN  - CIWA protocol; CIWA 0 (10/22)    -  consulted

## 2020-10-22 NOTE — DISCHARGE NOTE NURSING/CASE MANAGEMENT/SOCIAL WORK - PATIENT PORTAL LINK FT
You can access the FollowMyHealth Patient Portal offered by [facility] by registering at the following website: [email or web address]. By joining [employer]’s FollowMyHealth portal, you will also be able to view your health information using other applications (apps) compatible with our system.

## 2020-10-22 NOTE — PROGRESS NOTE ADULT - PROBLEM SELECTOR PLAN 2
- h/o EtOH withdrawal seizures; last admission 09/2020  - p/w unwitnessed fall; unlikely seizure this admission  - CT head neg  - s/p ativan 2mg, versed 2mg and haldol 5mg in ED  - c/w thiamine, folic acid, MVT, librium 25mg q8h standing, and ativan 2mg q2h PRN  - CIWA protocol; CIWA 0 (10/22)    -  consulted

## 2020-10-29 PROCEDURE — 80048 BASIC METABOLIC PNL TOTAL CA: CPT

## 2020-10-29 PROCEDURE — 72125 CT NECK SPINE W/O DYE: CPT

## 2020-10-29 PROCEDURE — 80178 ASSAY OF LITHIUM: CPT

## 2020-10-29 PROCEDURE — 80053 COMPREHEN METABOLIC PANEL: CPT

## 2020-10-29 PROCEDURE — 70450 CT HEAD/BRAIN W/O DYE: CPT

## 2020-10-29 PROCEDURE — 83605 ASSAY OF LACTIC ACID: CPT

## 2020-10-29 PROCEDURE — 81001 URINALYSIS AUTO W/SCOPE: CPT

## 2020-10-29 PROCEDURE — 83735 ASSAY OF MAGNESIUM: CPT

## 2020-10-29 PROCEDURE — 85025 COMPLETE CBC W/AUTO DIFF WBC: CPT

## 2020-10-29 PROCEDURE — 80061 LIPID PANEL: CPT

## 2020-10-29 PROCEDURE — 85027 COMPLETE CBC AUTOMATED: CPT

## 2020-10-29 PROCEDURE — 84100 ASSAY OF PHOSPHORUS: CPT

## 2020-10-29 PROCEDURE — 84702 CHORIONIC GONADOTROPIN TEST: CPT

## 2020-10-29 PROCEDURE — 86769 SARS-COV-2 COVID-19 ANTIBODY: CPT

## 2020-10-29 PROCEDURE — 80307 DRUG TEST PRSMV CHEM ANLYZR: CPT

## 2020-10-29 PROCEDURE — 80156 ASSAY CARBAMAZEPINE TOTAL: CPT

## 2020-10-29 PROCEDURE — 81025 URINE PREGNANCY TEST: CPT

## 2020-10-29 PROCEDURE — 36415 COLL VENOUS BLD VENIPUNCTURE: CPT

## 2020-10-29 PROCEDURE — 96372 THER/PROPH/DIAG INJ SC/IM: CPT

## 2020-10-29 PROCEDURE — 83036 HEMOGLOBIN GLYCOSYLATED A1C: CPT

## 2020-10-29 PROCEDURE — 84443 ASSAY THYROID STIM HORMONE: CPT

## 2020-10-29 PROCEDURE — 99285 EMERGENCY DEPT VISIT HI MDM: CPT

## 2020-10-29 PROCEDURE — 80164 ASSAY DIPROPYLACETIC ACD TOT: CPT

## 2020-10-29 PROCEDURE — 82962 GLUCOSE BLOOD TEST: CPT

## 2020-10-29 PROCEDURE — 80076 HEPATIC FUNCTION PANEL: CPT

## 2020-10-29 PROCEDURE — 87635 SARS-COV-2 COVID-19 AMP PRB: CPT

## 2020-10-29 PROCEDURE — 93005 ELECTROCARDIOGRAM TRACING: CPT

## 2020-11-04 ENCOUNTER — EMERGENCY (EMERGENCY)
Facility: HOSPITAL | Age: 35
LOS: 1 days | Discharge: HOME | End: 2020-11-04
Attending: EMERGENCY MEDICINE
Payer: MEDICAID

## 2020-11-04 VITALS
SYSTOLIC BLOOD PRESSURE: 140 MMHG | OXYGEN SATURATION: 94 % | HEART RATE: 98 BPM | RESPIRATION RATE: 18 BRPM | TEMPERATURE: 99 F | DIASTOLIC BLOOD PRESSURE: 89 MMHG

## 2020-11-04 VITALS — HEIGHT: 65 IN | WEIGHT: 169.98 LBS

## 2020-11-04 LAB
ACETONE SERPL-MCNC: NEGATIVE — SIGNIFICANT CHANGE UP
ALBUMIN SERPL ELPH-MCNC: 3.7 G/DL — SIGNIFICANT CHANGE UP (ref 3.5–5)
ALP SERPL-CCNC: 57 U/L — SIGNIFICANT CHANGE UP (ref 40–120)
ALT FLD-CCNC: 74 U/L DA — HIGH (ref 10–60)
AMPHET UR-MCNC: NEGATIVE — SIGNIFICANT CHANGE UP
ANION GAP SERPL CALC-SCNC: 7 MMOL/L — SIGNIFICANT CHANGE UP (ref 5–17)
APAP SERPL-MCNC: <2 UG/ML — LOW (ref 10–30)
APPEARANCE UR: CLEAR — SIGNIFICANT CHANGE UP
APTT BLD: 34.4 SEC — SIGNIFICANT CHANGE UP (ref 27.5–35.5)
AST SERPL-CCNC: 31 U/L — SIGNIFICANT CHANGE UP (ref 10–40)
BACTERIA # UR AUTO: NEGATIVE /HPF — SIGNIFICANT CHANGE UP
BARBITURATES UR SCN-MCNC: NEGATIVE — SIGNIFICANT CHANGE UP
BASOPHILS # BLD AUTO: 0.05 K/UL — SIGNIFICANT CHANGE UP (ref 0–0.2)
BASOPHILS NFR BLD AUTO: 0.4 % — SIGNIFICANT CHANGE UP (ref 0–2)
BENZODIAZ UR-MCNC: POSITIVE
BILIRUB SERPL-MCNC: <0.1 MG/DL — LOW (ref 0.2–1.2)
BILIRUB UR-MCNC: NEGATIVE — SIGNIFICANT CHANGE UP
BUN SERPL-MCNC: 9 MG/DL — SIGNIFICANT CHANGE UP (ref 7–18)
CALCIUM SERPL-MCNC: 8.5 MG/DL — SIGNIFICANT CHANGE UP (ref 8.4–10.5)
CHLORIDE SERPL-SCNC: 107 MMOL/L — SIGNIFICANT CHANGE UP (ref 96–108)
CO2 SERPL-SCNC: 29 MMOL/L — SIGNIFICANT CHANGE UP (ref 22–31)
COCAINE METAB.OTHER UR-MCNC: NEGATIVE — SIGNIFICANT CHANGE UP
COLOR SPEC: YELLOW — SIGNIFICANT CHANGE UP
CREAT SERPL-MCNC: 0.77 MG/DL — SIGNIFICANT CHANGE UP (ref 0.5–1.3)
DIFF PNL FLD: NEGATIVE — SIGNIFICANT CHANGE UP
EOSINOPHIL # BLD AUTO: 0.11 K/UL — SIGNIFICANT CHANGE UP (ref 0–0.5)
EOSINOPHIL NFR BLD AUTO: 0.9 % — SIGNIFICANT CHANGE UP (ref 0–6)
EPI CELLS # UR: SIGNIFICANT CHANGE UP /HPF
ETHANOL SERPL-MCNC: 367 MG/DL — HIGH (ref 0–10)
GLUCOSE SERPL-MCNC: 89 MG/DL — SIGNIFICANT CHANGE UP (ref 70–99)
GLUCOSE UR QL: NEGATIVE — SIGNIFICANT CHANGE UP
HCG SERPL-ACNC: <1 MIU/ML — SIGNIFICANT CHANGE UP
HCT VFR BLD CALC: 45.6 % — HIGH (ref 34.5–45)
HGB BLD-MCNC: 14.9 G/DL — SIGNIFICANT CHANGE UP (ref 11.5–15.5)
IMM GRANULOCYTES NFR BLD AUTO: 0.3 % — SIGNIFICANT CHANGE UP (ref 0–1.5)
INR BLD: 0.94 RATIO — SIGNIFICANT CHANGE UP (ref 0.88–1.16)
KETONES UR-MCNC: NEGATIVE — SIGNIFICANT CHANGE UP
LEUKOCYTE ESTERASE UR-ACNC: NEGATIVE — SIGNIFICANT CHANGE UP
LYMPHOCYTES # BLD AUTO: 36.6 % — SIGNIFICANT CHANGE UP (ref 13–44)
LYMPHOCYTES # BLD AUTO: 4.3 K/UL — HIGH (ref 1–3.3)
MAGNESIUM SERPL-MCNC: 2.1 MG/DL — SIGNIFICANT CHANGE UP (ref 1.6–2.6)
MCHC RBC-ENTMCNC: 31 PG — SIGNIFICANT CHANGE UP (ref 27–34)
MCHC RBC-ENTMCNC: 32.7 GM/DL — SIGNIFICANT CHANGE UP (ref 32–36)
MCV RBC AUTO: 95 FL — SIGNIFICANT CHANGE UP (ref 80–100)
METHADONE UR-MCNC: NEGATIVE — SIGNIFICANT CHANGE UP
MONOCYTES # BLD AUTO: 0.43 K/UL — SIGNIFICANT CHANGE UP (ref 0–0.9)
MONOCYTES NFR BLD AUTO: 3.7 % — SIGNIFICANT CHANGE UP (ref 2–14)
NEUTROPHILS # BLD AUTO: 6.82 K/UL — SIGNIFICANT CHANGE UP (ref 1.8–7.4)
NEUTROPHILS NFR BLD AUTO: 58.1 % — SIGNIFICANT CHANGE UP (ref 43–77)
NITRITE UR-MCNC: NEGATIVE — SIGNIFICANT CHANGE UP
NRBC # BLD: 0 /100 WBCS — SIGNIFICANT CHANGE UP (ref 0–0)
OPIATES UR-MCNC: NEGATIVE — SIGNIFICANT CHANGE UP
PCP SPEC-MCNC: SIGNIFICANT CHANGE UP
PCP UR-MCNC: NEGATIVE — SIGNIFICANT CHANGE UP
PH UR: 5 — SIGNIFICANT CHANGE UP (ref 5–8)
PLATELET # BLD AUTO: 337 K/UL — SIGNIFICANT CHANGE UP (ref 150–400)
POTASSIUM SERPL-MCNC: 3.9 MMOL/L — SIGNIFICANT CHANGE UP (ref 3.5–5.3)
POTASSIUM SERPL-SCNC: 3.9 MMOL/L — SIGNIFICANT CHANGE UP (ref 3.5–5.3)
PROT SERPL-MCNC: 7.9 G/DL — SIGNIFICANT CHANGE UP (ref 6–8.3)
PROT UR-MCNC: 30 MG/DL
PROTHROM AB SERPL-ACNC: 11.2 SEC — SIGNIFICANT CHANGE UP (ref 10.6–13.6)
RBC # BLD: 4.8 M/UL — SIGNIFICANT CHANGE UP (ref 3.8–5.2)
RBC # FLD: 14.1 % — SIGNIFICANT CHANGE UP (ref 10.3–14.5)
RBC CASTS # UR COMP ASSIST: NEGATIVE /HPF — SIGNIFICANT CHANGE UP (ref 0–2)
SALICYLATES SERPL-MCNC: 3.3 MG/DL — SIGNIFICANT CHANGE UP (ref 2.8–20)
SODIUM SERPL-SCNC: 143 MMOL/L — SIGNIFICANT CHANGE UP (ref 135–145)
SP GR SPEC: 1.01 — SIGNIFICANT CHANGE UP (ref 1.01–1.02)
THC UR QL: NEGATIVE — SIGNIFICANT CHANGE UP
UROBILINOGEN FLD QL: NEGATIVE — SIGNIFICANT CHANGE UP
WBC # BLD: 11.74 K/UL — HIGH (ref 3.8–10.5)
WBC # FLD AUTO: 11.74 K/UL — HIGH (ref 3.8–10.5)
WBC UR QL: SIGNIFICANT CHANGE UP /HPF (ref 0–5)

## 2020-11-04 PROCEDURE — 81001 URINALYSIS AUTO W/SCOPE: CPT

## 2020-11-04 PROCEDURE — 83735 ASSAY OF MAGNESIUM: CPT

## 2020-11-04 PROCEDURE — 70450 CT HEAD/BRAIN W/O DYE: CPT

## 2020-11-04 PROCEDURE — 85610 PROTHROMBIN TIME: CPT

## 2020-11-04 PROCEDURE — 85730 THROMBOPLASTIN TIME PARTIAL: CPT

## 2020-11-04 PROCEDURE — 96372 THER/PROPH/DIAG INJ SC/IM: CPT | Mod: XU

## 2020-11-04 PROCEDURE — 70450 CT HEAD/BRAIN W/O DYE: CPT | Mod: 26

## 2020-11-04 PROCEDURE — 96374 THER/PROPH/DIAG INJ IV PUSH: CPT

## 2020-11-04 PROCEDURE — 99291 CRITICAL CARE FIRST HOUR: CPT

## 2020-11-04 PROCEDURE — 36415 COLL VENOUS BLD VENIPUNCTURE: CPT

## 2020-11-04 PROCEDURE — 96361 HYDRATE IV INFUSION ADD-ON: CPT

## 2020-11-04 PROCEDURE — 80053 COMPREHEN METABOLIC PANEL: CPT

## 2020-11-04 PROCEDURE — 80307 DRUG TEST PRSMV CHEM ANLYZR: CPT

## 2020-11-04 PROCEDURE — 99291 CRITICAL CARE FIRST HOUR: CPT | Mod: 25

## 2020-11-04 PROCEDURE — 82009 KETONE BODYS QUAL: CPT

## 2020-11-04 PROCEDURE — 82962 GLUCOSE BLOOD TEST: CPT

## 2020-11-04 PROCEDURE — 84702 CHORIONIC GONADOTROPIN TEST: CPT

## 2020-11-04 PROCEDURE — 85025 COMPLETE CBC W/AUTO DIFF WBC: CPT

## 2020-11-04 RX ORDER — HALOPERIDOL DECANOATE 100 MG/ML
5 INJECTION INTRAMUSCULAR ONCE
Refills: 0 | Status: COMPLETED | OUTPATIENT
Start: 2020-11-04 | End: 2020-11-04

## 2020-11-04 RX ORDER — SODIUM CHLORIDE 9 MG/ML
1000 INJECTION, SOLUTION INTRAVENOUS
Refills: 0 | Status: DISCONTINUED | OUTPATIENT
Start: 2020-11-04 | End: 2020-11-08

## 2020-11-04 RX ORDER — SODIUM CHLORIDE 9 MG/ML
1000 INJECTION INTRAMUSCULAR; INTRAVENOUS; SUBCUTANEOUS
Refills: 0 | Status: DISCONTINUED | OUTPATIENT
Start: 2020-11-04 | End: 2020-11-08

## 2020-11-04 RX ORDER — DIPHENHYDRAMINE HCL 50 MG
50 CAPSULE ORAL ONCE
Refills: 0 | Status: COMPLETED | OUTPATIENT
Start: 2020-11-04 | End: 2020-11-04

## 2020-11-04 RX ORDER — HALOPERIDOL DECANOATE 100 MG/ML
50 INJECTION INTRAMUSCULAR ONCE
Refills: 0 | Status: DISCONTINUED | OUTPATIENT
Start: 2020-11-04 | End: 2020-11-04

## 2020-11-04 RX ADMIN — HALOPERIDOL DECANOATE 5 MILLIGRAM(S): 100 INJECTION INTRAMUSCULAR at 13:32

## 2020-11-04 RX ADMIN — SODIUM CHLORIDE 125 MILLILITER(S): 9 INJECTION, SOLUTION INTRAVENOUS at 20:30

## 2020-11-04 RX ADMIN — Medication 50 MILLIGRAM(S): at 13:19

## 2020-11-04 RX ADMIN — SODIUM CHLORIDE 125 MILLILITER(S): 9 INJECTION INTRAMUSCULAR; INTRAVENOUS; SUBCUTANEOUS at 17:59

## 2020-11-04 RX ADMIN — SODIUM CHLORIDE 1000 MILLILITER(S): 9 INJECTION, SOLUTION INTRAVENOUS at 21:19

## 2020-11-04 RX ADMIN — Medication 2 MILLIGRAM(S): at 13:19

## 2020-11-04 NOTE — ED ADULT NURSE NOTE - NSIMPLEMENTINTERV_GEN_ALL_ED
Implemented All Fall Risk Interventions:  [place] to call system. Call bell, personal items and telephone within reach. Instruct patient to call for assistance. Room bathroom lighting operational. Non-slip footwear when patient is off stretcher. Physically safe environment: no spills, clutter or unnecessary equipment. Stretcher in lowest position, wheels locked, appropriate side rails in place. Provide visual cue, wrist band, yellow gown, etc. Monitor gait and stability. Monitor for mental status changes and reorient to person, place, and time. Review medications for side effects contributing to fall risk. Reinforce activity limits and safety measures with patient and family.

## 2020-11-04 NOTE — ED ADULT TRIAGE NOTE - CHIEF COMPLAINT QUOTE
BIBA for c/o altered mental status  as per  family  pt been drinking alcohol for 4 days and took  8 pills of xanax today. pt very uncooperative upon arrival.

## 2020-11-04 NOTE — ED PROVIDER NOTE - OBJECTIVE STATEMENT
36 y/o F pt with a PMHx of Chronic EtOH abuse, EtOH withdrawal Sz, and Depression and no significant PSHx BIB EMS with  for ETOH intoxication and abuse of Xanax today.  reports pt has been drinking since last Friday and appeared to have a seizure this morning.  states pt has taken about 8 tablets of 2mg Xanax since yesterday and has been downing it with a quarter liter of Vodka daily (last drink 10:00 this morning).  reports pt has been feeling depressed and anxious about the corona virus and when she is not working she begins to drink.  also reports pt fell in the bathtub yesterday and lost consciousness for about 5-10 minutes (unwitnessed & unknown mechanism). Pt has had a suicide attempt in past and was admitted multiple times to [facility] and [facility] for psychiatry. LMP 1 week ago.  denies vomiting or any other acute complaints. NKDA. 34 y/o F pt with a PMHx of Chronic EtOH abuse, EtOH withdrawal Sz, and Depression and no significant PSHx BIB EMS with  for ETOH intoxication and abuse of Xanax today.  reports pt has been drinking since last Friday and appeared to have a seizure this morning.  states pt has taken about 8 tablets of 2mg Xanax since yesterday and has been downing it with a quarter liter of Vodka daily (last drink 10:00 this morning).  reports pt has been feeling depressed and anxious about the corona virus and when she is not working she begins to drink.  also reports pt fell in the bathtub yesterday and lost consciousness for about 5-10 minutes (unwitnessed & unknown mechanism). Pt has had a suicide attempt in past and was admitted multiple times to [facility] and [facility] for psychiatry. LMP 1 week ago.  denies vomiting or any other acute complaints. NKDA.  Pt denies feeling depressed, no SI/HI

## 2020-11-04 NOTE — ED ADULT NURSE NOTE - ED STAT RN HANDOFF DETAILS 2
pt.remained stable..denies pain., re-eval by   with order to d/c home. no suicidal ideation noted. left  in the ed in stable condition with steady gait. no acute distress noted

## 2020-11-04 NOTE — PATIENT PROFILE ADULT - SAFE PLACE TO LIVE
Please schedule procedure for this patient in 5 weeks    Procedure - Colonoscopy with EMR  Diagnosis - Advanced Adenoma  Location - [facility] or [facility]  Duration - 60 min  Preparation - Suprep - Split Prep, if not able to afford can prescribe Golytely Split Prep  Special equipment - ERBE Lift  Vendor for the case - ERBE Rep  Medications - Levofloxacin 500 mg IV one dose, Hetastarch 5% - 500 ml for submucosal lift and Methylene blue for submucosal lift    Pl update me once the patient has been scheduled     MD Bennett Gonzales MD   no

## 2020-11-04 NOTE — ED PROVIDER NOTE - PROGRESS NOTE DETAILS
Pt was very aggressive, combative upon arrival.  Pt required emergent sedation.  Pt slept, now awake, claims she's feeling good. in no distress, admits to drinking [place] alcohol.  Denies feeling depressed, SI/HI, will d/c home.  Ox3, pt's ambulating with no ataxia.  Pt admits to working & missed work today. Pt was very aggressive, combative upon arrival.  Pt required emergent sedation.  Pt slept, now awake, claims she's feeling good. in no distress, admits to drinking Polish alcohol.  Denies feeling depressed, SI/HI, will d/c home.  Ox3, pt's ambulating with no ataxia.  Pt admits to working & missed work today.  Asked pt multiple times, pt denies SI/HI.

## 2020-11-04 NOTE — ED ADULT NURSE NOTE - CHIEF COMPLAINT QUOTE
As per  drinking Vodka x 6 days last drink 10 am this morning fell in the bathroom this morning and have been taking Xanax

## 2020-11-04 NOTE — ED ADULT NURSE NOTE - OBJECTIVE STATEMENT
As per  drinking Vodka x 6 days last drink 10 am this morning fell in the bathroom this morning and have been taking Xanax. While on EMS stretcher pt is combative ans trying to come off stretcher. Angie flores called

## 2020-11-04 NOTE — ED PROVIDER NOTE - CLINICAL SUMMARY MEDICAL DECISION MAKING FREE TEXT BOX
pt with ETOH abuse, also Benzo abuse, pt's agitated upon arrival to ED, pt also fell yest., will get labs, CT head, give meds., reassess

## 2020-11-12 ENCOUNTER — EMERGENCY (EMERGENCY)
Facility: HOSPITAL | Age: 35
LOS: 1 days | Discharge: HOME | End: 2020-11-12
Attending: EMERGENCY MEDICINE
Payer: MEDICAID

## 2020-11-12 VITALS
RESPIRATION RATE: 16 BRPM | HEIGHT: 65 IN | HEART RATE: 136 BPM | SYSTOLIC BLOOD PRESSURE: 149 MMHG | OXYGEN SATURATION: 93 % | TEMPERATURE: 97 F | DIASTOLIC BLOOD PRESSURE: 90 MMHG

## 2020-11-12 VITALS
RESPIRATION RATE: 16 BRPM | DIASTOLIC BLOOD PRESSURE: 68 MMHG | TEMPERATURE: 98 F | OXYGEN SATURATION: 95 % | SYSTOLIC BLOOD PRESSURE: 112 MMHG | HEART RATE: 103 BPM

## 2020-11-12 LAB
ANION GAP SERPL CALC-SCNC: 5 MMOL/L — SIGNIFICANT CHANGE UP (ref 5–17)
APAP SERPL-MCNC: <2 UG/ML — LOW (ref 10–30)
BASOPHILS # BLD AUTO: 0.04 K/UL — SIGNIFICANT CHANGE UP (ref 0–0.2)
BASOPHILS NFR BLD AUTO: 0.5 % — SIGNIFICANT CHANGE UP (ref 0–2)
BUN SERPL-MCNC: 8 MG/DL — SIGNIFICANT CHANGE UP (ref 7–18)
CALCIUM SERPL-MCNC: 7.7 MG/DL — LOW (ref 8.4–10.5)
CHLORIDE SERPL-SCNC: 109 MMOL/L — HIGH (ref 96–108)
CO2 SERPL-SCNC: 27 MMOL/L — SIGNIFICANT CHANGE UP (ref 22–31)
CREAT SERPL-MCNC: 0.51 MG/DL — SIGNIFICANT CHANGE UP (ref 0.5–1.3)
EOSINOPHIL # BLD AUTO: 0.15 K/UL — SIGNIFICANT CHANGE UP (ref 0–0.5)
EOSINOPHIL NFR BLD AUTO: 1.8 % — SIGNIFICANT CHANGE UP (ref 0–6)
ETHANOL SERPL-MCNC: 366 MG/DL — HIGH (ref 0–10)
GLUCOSE SERPL-MCNC: 92 MG/DL — SIGNIFICANT CHANGE UP (ref 70–99)
HCG SERPL-ACNC: <1 MIU/ML — SIGNIFICANT CHANGE UP
HCT VFR BLD CALC: 40.2 % — SIGNIFICANT CHANGE UP (ref 34.5–45)
HGB BLD-MCNC: 13.4 G/DL — SIGNIFICANT CHANGE UP (ref 11.5–15.5)
IMM GRANULOCYTES NFR BLD AUTO: 0.2 % — SIGNIFICANT CHANGE UP (ref 0–1.5)
LYMPHOCYTES # BLD AUTO: 3.09 K/UL — SIGNIFICANT CHANGE UP (ref 1–3.3)
LYMPHOCYTES # BLD AUTO: 37.7 % — SIGNIFICANT CHANGE UP (ref 13–44)
MCHC RBC-ENTMCNC: 31.6 PG — SIGNIFICANT CHANGE UP (ref 27–34)
MCHC RBC-ENTMCNC: 33.3 GM/DL — SIGNIFICANT CHANGE UP (ref 32–36)
MCV RBC AUTO: 94.8 FL — SIGNIFICANT CHANGE UP (ref 80–100)
MONOCYTES # BLD AUTO: 0.66 K/UL — SIGNIFICANT CHANGE UP (ref 0–0.9)
MONOCYTES NFR BLD AUTO: 8.1 % — SIGNIFICANT CHANGE UP (ref 2–14)
NEUTROPHILS # BLD AUTO: 4.23 K/UL — SIGNIFICANT CHANGE UP (ref 1.8–7.4)
NEUTROPHILS NFR BLD AUTO: 51.7 % — SIGNIFICANT CHANGE UP (ref 43–77)
NRBC # BLD: 0 /100 WBCS — SIGNIFICANT CHANGE UP (ref 0–0)
PLATELET # BLD AUTO: 207 K/UL — SIGNIFICANT CHANGE UP (ref 150–400)
POTASSIUM SERPL-MCNC: 4.1 MMOL/L — SIGNIFICANT CHANGE UP (ref 3.5–5.3)
POTASSIUM SERPL-SCNC: 4.1 MMOL/L — SIGNIFICANT CHANGE UP (ref 3.5–5.3)
RBC # BLD: 4.24 M/UL — SIGNIFICANT CHANGE UP (ref 3.8–5.2)
RBC # FLD: 15 % — HIGH (ref 10.3–14.5)
SALICYLATES SERPL-MCNC: 5.2 MG/DL — SIGNIFICANT CHANGE UP (ref 2.8–20)
SODIUM SERPL-SCNC: 141 MMOL/L — SIGNIFICANT CHANGE UP (ref 135–145)
WBC # BLD: 8.19 K/UL — SIGNIFICANT CHANGE UP (ref 3.8–10.5)
WBC # FLD AUTO: 8.19 K/UL — SIGNIFICANT CHANGE UP (ref 3.8–10.5)

## 2020-11-12 PROCEDURE — 96372 THER/PROPH/DIAG INJ SC/IM: CPT

## 2020-11-12 PROCEDURE — 36415 COLL VENOUS BLD VENIPUNCTURE: CPT

## 2020-11-12 PROCEDURE — 99284 EMERGENCY DEPT VISIT MOD MDM: CPT

## 2020-11-12 PROCEDURE — 80048 BASIC METABOLIC PNL TOTAL CA: CPT

## 2020-11-12 PROCEDURE — 84702 CHORIONIC GONADOTROPIN TEST: CPT

## 2020-11-12 PROCEDURE — 80307 DRUG TEST PRSMV CHEM ANLYZR: CPT

## 2020-11-12 PROCEDURE — 82962 GLUCOSE BLOOD TEST: CPT

## 2020-11-12 PROCEDURE — 85025 COMPLETE CBC W/AUTO DIFF WBC: CPT

## 2020-11-12 PROCEDURE — 99285 EMERGENCY DEPT VISIT HI MDM: CPT | Mod: 25

## 2020-11-12 RX ORDER — HALOPERIDOL DECANOATE 100 MG/ML
5 INJECTION INTRAMUSCULAR ONCE
Refills: 0 | Status: COMPLETED | OUTPATIENT
Start: 2020-11-12 | End: 2020-11-12

## 2020-11-12 RX ORDER — MIDAZOLAM HYDROCHLORIDE 1 MG/ML
4 INJECTION, SOLUTION INTRAMUSCULAR; INTRAVENOUS ONCE
Refills: 0 | Status: DISCONTINUED | OUTPATIENT
Start: 2020-11-12 | End: 2020-11-12

## 2020-11-12 RX ADMIN — HALOPERIDOL DECANOATE 5 MILLIGRAM(S): 100 INJECTION INTRAMUSCULAR at 01:52

## 2020-11-12 RX ADMIN — MIDAZOLAM HYDROCHLORIDE 4 MILLIGRAM(S): 1 INJECTION, SOLUTION INTRAMUSCULAR; INTRAVENOUS at 01:52

## 2020-11-12 NOTE — ED PROVIDER NOTE - PROGRESS NOTE DETAILS
Cassandra - SO received from Dr Zarate. Pt evaluated at bedside, feel much better. AAOx3, CN ambulating w normal gait, no slurred speech, neuro intact. Wants to go home. Discussed options for rehab, will follow up

## 2020-11-12 NOTE — ED PROVIDER NOTE - NSFOLLOWUPCLINICS_GEN_ALL_ED_FT
[facility]  [facility]  [address] [place] [address]  Phone: [phone]  Fax: [phone]  Follow Up Time:     [facility]  Detox  [address] [place] [address]  Phone: [phone]  Fax: [phone]  Follow Up Time:

## 2020-11-12 NOTE — ED PROVIDER NOTE - OBJECTIVE STATEMENT
34 yo F h/o etoh abuse and w/d, depression p/w etoh intoxication and combativeness. Hx per  Gallito as pt too intoxicated. Pt has been drinking liquor and taking xanax for 4 days and  is afraid that she is going to hurt herself. Pt makes statements of self harm while intoxicated but has not attempted any self harm recently nor endorsed SI while not intoxicated.  denies any recent fever or infectious symptoms, trauma/injuries, seizure other recent illness or hospitalizations.

## 2020-11-12 NOTE — ED ADULT NURSE NOTE - NSIMPLEMENTINTERV_GEN_ALL_ED
Implemented All Fall Risk Interventions:  [place] to call system. Call bell, personal items and telephone within reach. Instruct patient to call for assistance. Room bathroom lighting operational. Non-slip footwear when patient is off stretcher. Physically safe environment: no spills, clutter or unnecessary equipment. Stretcher in lowest position, wheels locked, appropriate side rails in place. Provide visual cue, wrist band, yellow gown, etc. Monitor gait and stability. Monitor for mental status changes and reorient to person, place, and time. Review medications for side effects contributing to fall risk. Reinforce activity limits and safety measures with patient and family.

## 2020-11-12 NOTE — ED PROVIDER NOTE - NSFOLLOWUPINSTRUCTIONS_ED_ALL_ED_FT
Stop drinking alcohol.  Take Tylenol as needed for pains/fevers.  Drink plenty of fluids.   Follow up with your doctor/Rehab or in the Clinic as discussed within 2 days.  Return to the ER for any concerns.

## 2020-11-12 NOTE — ED PROVIDER NOTE - PHYSICAL EXAMINATION
Vital Signs Reviewed  GEN: Etoh odor, Refusing to sit in bed  HEENT: NCAT, MMM, Neck Supple  RESP: CTAB, No rales/rhonchi/wheezing  CV: RRR, S1S2, No murmurs  ABD: No TTP, ND, No masses, No CVA Tenderness  Extrem/Skin: Equal pulses bilat, No cyanosis/edema/rashes  Neuro: No focal deficits

## 2020-11-12 NOTE — ED ADULT TRIAGE NOTE - CHIEF COMPLAINT QUOTE
She drank alcohol uninterrupted  x 4 days, drank  non prescribed Xanax, as per spouse, amount unknown. She had a seizure today.

## 2020-11-12 NOTE — ED PROVIDER NOTE - CLINICAL SUMMARY MEDICAL DECISION MAKING FREE TEXT BOX
Pt p/w etoh and benzo intox with  stating that he's afraid she's going to hurt herself. Pt intoxicated with no signs of trauma on exam. Etoh 360 and otw unremarkable labs. Will reassess pt when clinically sober. Pt stable.

## 2020-11-12 NOTE — ED PROVIDER NOTE - PATIENT PORTAL LINK FT
You can access the FollowMyHealth Patient Portal offered by [facility] by registering at the following website: [email or web address]. By joining [employer]’s FollowMyHealth portal, you will also be able to view your health information using other applications (apps) compatible with our system.

## 2020-11-13 NOTE — PATIENT PROFILE ADULT - FALLEN IN THE PAST
DATE OF ADMISSION:  11/04/2020



DATE OF DISCHARGE:  11/10/2020



FINAL DIAGNOSES:  

1. 78-year-old presented with sudden onset of numbness from T6 level with gross

bilateral lower extremity weakness secondary to thoracic sclerotic bone lesions,

diffuse with prostate cancer. 

2. New onset of confusion.

3. Fever.

4. Acute anemia, symptomatic.

5. Congestive heart failure, systolic dysfunction with EF 45%.

6. Coronary artery disease.

7. Hypertension.

8. Hyperlipidemia.

9. Urinary retention secondary to #1.

10. Constipation.

11. Noncompliance for treatment of prostate cancer with metastasis.

12. Full code.



HISTORY OF PRESENT ILLNESS/COURSE IN THE PICKETT:  Mr. Link is a 78-year-old 

gentleman with coronary artery disease, ischemic cardiomyopathy with EF of 40%,

hyperlipidemia, hypertension, and prostate cancer with metastasis to multiple sites

including bone and spine on Casodex under the care of Dr. Tsang.  His condition

started in 2018 when he presented to [facility]rology Clinic with significantly high PSA of

852, it was consistent with prostate cancer with metastatic lesions.  He did not

have a prior biopsy.  He was seen by Dr. Dye in March of 2018, he was advised

Lupron, immunotherapy (Zytiga) and prednisone.  However, he was lost to follow up

and admitted that he cannot afford Zytiga.  The patient presented to [facility] on

07/17/2018, complaining of bilateral lower extremity discomfort described as burning

pain down the back of his legs.  He was seen by Dr. Palumbo, Neurosurgery, and

discussed with the patient that this symptoms were likely secondary to metastatic

disease.  He was also seen by Dr. Edgar and advised him to initiate palliative

therapy for metastatic prostate cancer.  He was offered prostate biopsy, but he

declined.  He was repeatedly advised by the specialist regarding poor prognosis of

his prostate cancer due to widespread metastatic lesions.  He was referred for

palliative care.  Further review of medical records, which showed multiple imaging

consisting in the CT scan febrile of 2018, demonstrating extensive lymphadenopathy

of periaortic, pericaval iliac nodes, multiple sclerotic areas of the spine and

pelvis consistent with diffuse metastatic disease, presence of L4-L5 sclerosis.  CT

scan on 07/16/2018 showed multiple bilateral lung nodules consistent with widespread

osseous metastatic disease.  The prostate was irregular and lobulated.  Mass seen in

the bladder extending from prostate consistent with local invasion.  The patient

also had an MRI on 07/02/2018 that showed abnormal signal of L5 vertebral body

suspicious for pathologic fracture from bony metastatic disease.  He had marked

lymphadenopathy.  According to patient, since then, he had been confined to a

wheelchair.  However, he thought that this was just secondary to advanced bilateral

knee osteoarthritis.  According to patient, his specialists from [facility] refused to

prescribe him Casodex.  Hence, he transferred care to [facility] with Dr. Tsang

(Oncologist).  He had not had any additional imaging recently.  The patient was

admitted for acute on chronic CHF recently and had CT of the abdomen and pelvis that

showed bilateral pleural effusion, resolution of pleural-based nodular lesion on the

left lung.  However, he had interval development of prominent nodular thickening

involving adrenal glands.  There were also noted diffuse osseous metastatic disease.

 The patient was under home health for physical therapy.  He was also advised

recently by his PCP to undergo cardiac outpatient rehab.  Unfortunately, prior to

admission, he had difficulty with transfers and sudden weakness, hence evaluation at

the ED and subsequent admission on November 4, 2020, for possible rehab.  During his

stay in the hospital, he had marked numbness from T6 level with gross bilateral

lower extremity weakness.  He had low-grade fever two days prior to his transfer,

and developed increasing drowsiness and poor appetite.  His blood pressure remained

low throughout his stay, hence IV fluid repletion was initiated.  Persistence of

fever and altered mental status of the patient required higher level of care, the

patient's wife consulted.  The patient's wife asked for scheduled appointment with

radio oncologist at [facility].  However, they told her that they had to get an

approval from the insurance regarding radiation treatment if he stays in the nursing

home.  The patient's wife then requested for transfer to [facility]. 



DISPOSITION:  Discharge to [facility] in [place].



MEDICATIONS:  

1. Colace 100 mg b.i.d.

2. Dexamethasone 10 mg per IV x1.

3. Ferrous sulfate 325 mg daily.

4. Lovenox 40 mg at bedtime.

5. Normal saline 50 mL/h.

6. Rocephin 1 g per IV q.12 hours.

7. Tylenol 650 mg every 4 hours.

8. Vancomycin 1 g per IV q.12 hours.

9. Zofran 4 mg sublingual every 6 hours as needed.

10. Flomax 0.4 mg b.i.d.

11. Vitamin B12 of 1000 mcg at bedtime.

12. Coreg 3.125 mg b.i.d.

13. Aspirin 81 mg at bedtime.



CONDITION:  Guarded.



CODE STATUS:  Full code.







Job ID:  [id number]
no

## 2020-11-23 ENCOUNTER — EMERGENCY (EMERGENCY)
Facility: HOSPITAL | Age: 35
LOS: 1 days | Discharge: HOME | End: 2020-11-23
Attending: EMERGENCY MEDICINE
Payer: MEDICAID

## 2020-11-23 VITALS
TEMPERATURE: 98 F | DIASTOLIC BLOOD PRESSURE: 92 MMHG | OXYGEN SATURATION: 97 % | RESPIRATION RATE: 18 BRPM | HEART RATE: 92 BPM | SYSTOLIC BLOOD PRESSURE: 151 MMHG

## 2020-11-23 VITALS
HEIGHT: 65 IN | TEMPERATURE: 98 F | DIASTOLIC BLOOD PRESSURE: 94 MMHG | HEART RATE: 111 BPM | OXYGEN SATURATION: 95 % | RESPIRATION RATE: 18 BRPM | SYSTOLIC BLOOD PRESSURE: 137 MMHG

## 2020-11-23 DIAGNOSIS — F39 UNSPECIFIED MOOD [AFFECTIVE] DISORDER: ICD-10-CM

## 2020-11-23 DIAGNOSIS — F10.220 ALCOHOL DEPENDENCE WITH INTOXICATION, UNCOMPLICATED: ICD-10-CM

## 2020-11-23 LAB
ALBUMIN SERPL ELPH-MCNC: 3.2 G/DL — LOW (ref 3.5–5)
ALP SERPL-CCNC: 43 U/L — SIGNIFICANT CHANGE UP (ref 40–120)
ALT FLD-CCNC: 79 U/L DA — HIGH (ref 10–60)
ANION GAP SERPL CALC-SCNC: 6 MMOL/L — SIGNIFICANT CHANGE UP (ref 5–17)
APAP SERPL-MCNC: <2 UG/ML — LOW (ref 10–30)
APPEARANCE UR: CLEAR — SIGNIFICANT CHANGE UP
AST SERPL-CCNC: 41 U/L — HIGH (ref 10–40)
BASOPHILS # BLD AUTO: 0.04 K/UL — SIGNIFICANT CHANGE UP (ref 0–0.2)
BASOPHILS NFR BLD AUTO: 0.6 % — SIGNIFICANT CHANGE UP (ref 0–2)
BILIRUB SERPL-MCNC: 0.2 MG/DL — SIGNIFICANT CHANGE UP (ref 0.2–1.2)
BILIRUB UR-MCNC: NEGATIVE — SIGNIFICANT CHANGE UP
BUN SERPL-MCNC: 9 MG/DL — SIGNIFICANT CHANGE UP (ref 7–18)
CALCIUM SERPL-MCNC: 8.1 MG/DL — LOW (ref 8.4–10.5)
CHLORIDE SERPL-SCNC: 108 MMOL/L — SIGNIFICANT CHANGE UP (ref 96–108)
CO2 SERPL-SCNC: 26 MMOL/L — SIGNIFICANT CHANGE UP (ref 22–31)
COLOR SPEC: YELLOW — SIGNIFICANT CHANGE UP
CREAT SERPL-MCNC: 0.44 MG/DL — LOW (ref 0.5–1.3)
DIFF PNL FLD: NEGATIVE — SIGNIFICANT CHANGE UP
EOSINOPHIL # BLD AUTO: 0.2 K/UL — SIGNIFICANT CHANGE UP (ref 0–0.5)
EOSINOPHIL NFR BLD AUTO: 2.8 % — SIGNIFICANT CHANGE UP (ref 0–6)
ETHANOL SERPL-MCNC: 285 MG/DL — HIGH (ref 0–10)
GLUCOSE SERPL-MCNC: 83 MG/DL — SIGNIFICANT CHANGE UP (ref 70–99)
GLUCOSE UR QL: NEGATIVE — SIGNIFICANT CHANGE UP
HCG SERPL-ACNC: <1 MIU/ML — SIGNIFICANT CHANGE UP
HCT VFR BLD CALC: 39.5 % — SIGNIFICANT CHANGE UP (ref 34.5–45)
HGB BLD-MCNC: 13 G/DL — SIGNIFICANT CHANGE UP (ref 11.5–15.5)
IMM GRANULOCYTES NFR BLD AUTO: 0.3 % — SIGNIFICANT CHANGE UP (ref 0–1.5)
KETONES UR-MCNC: NEGATIVE — SIGNIFICANT CHANGE UP
LACTATE SERPL-SCNC: 0.8 MMOL/L — SIGNIFICANT CHANGE UP (ref 0.7–2)
LEUKOCYTE ESTERASE UR-ACNC: NEGATIVE — SIGNIFICANT CHANGE UP
LIDOCAIN IGE QN: 431 U/L — HIGH (ref 73–393)
LYMPHOCYTES # BLD AUTO: 3.8 K/UL — HIGH (ref 1–3.3)
LYMPHOCYTES # BLD AUTO: 53 % — HIGH (ref 13–44)
MCHC RBC-ENTMCNC: 31.5 PG — SIGNIFICANT CHANGE UP (ref 27–34)
MCHC RBC-ENTMCNC: 32.9 GM/DL — SIGNIFICANT CHANGE UP (ref 32–36)
MCV RBC AUTO: 95.6 FL — SIGNIFICANT CHANGE UP (ref 80–100)
MONOCYTES # BLD AUTO: 0.78 K/UL — SIGNIFICANT CHANGE UP (ref 0–0.9)
MONOCYTES NFR BLD AUTO: 10.9 % — SIGNIFICANT CHANGE UP (ref 2–14)
NEUTROPHILS # BLD AUTO: 2.33 K/UL — SIGNIFICANT CHANGE UP (ref 1.8–7.4)
NEUTROPHILS NFR BLD AUTO: 32.4 % — LOW (ref 43–77)
NITRITE UR-MCNC: NEGATIVE — SIGNIFICANT CHANGE UP
NRBC # BLD: 0 /100 WBCS — SIGNIFICANT CHANGE UP (ref 0–0)
PCP SPEC-MCNC: SIGNIFICANT CHANGE UP
PH UR: 6.5 — SIGNIFICANT CHANGE UP (ref 5–8)
PLATELET # BLD AUTO: 288 K/UL — SIGNIFICANT CHANGE UP (ref 150–400)
POTASSIUM SERPL-MCNC: 3.7 MMOL/L — SIGNIFICANT CHANGE UP (ref 3.5–5.3)
POTASSIUM SERPL-SCNC: 3.7 MMOL/L — SIGNIFICANT CHANGE UP (ref 3.5–5.3)
PROT SERPL-MCNC: 6.7 G/DL — SIGNIFICANT CHANGE UP (ref 6–8.3)
PROT UR-MCNC: 15
RBC # BLD: 4.13 M/UL — SIGNIFICANT CHANGE UP (ref 3.8–5.2)
RBC # FLD: 14.5 % — SIGNIFICANT CHANGE UP (ref 10.3–14.5)
SALICYLATES SERPL-MCNC: 3.5 MG/DL — SIGNIFICANT CHANGE UP (ref 2.8–20)
SODIUM SERPL-SCNC: 140 MMOL/L — SIGNIFICANT CHANGE UP (ref 135–145)
SP GR SPEC: 1.01 — SIGNIFICANT CHANGE UP (ref 1.01–1.02)
TROPONIN I SERPL-MCNC: <0.015 NG/ML — SIGNIFICANT CHANGE UP (ref 0–0.04)
UROBILINOGEN FLD QL: NEGATIVE — SIGNIFICANT CHANGE UP
WBC # BLD: 7.17 K/UL — SIGNIFICANT CHANGE UP (ref 3.8–10.5)
WBC # FLD AUTO: 7.17 K/UL — SIGNIFICANT CHANGE UP (ref 3.8–10.5)

## 2020-11-23 PROCEDURE — 84484 ASSAY OF TROPONIN QUANT: CPT

## 2020-11-23 PROCEDURE — 82962 GLUCOSE BLOOD TEST: CPT

## 2020-11-23 PROCEDURE — 96374 THER/PROPH/DIAG INJ IV PUSH: CPT

## 2020-11-23 PROCEDURE — 83605 ASSAY OF LACTIC ACID: CPT

## 2020-11-23 PROCEDURE — 36415 COLL VENOUS BLD VENIPUNCTURE: CPT

## 2020-11-23 PROCEDURE — 71045 X-RAY EXAM CHEST 1 VIEW: CPT

## 2020-11-23 PROCEDURE — 83690 ASSAY OF LIPASE: CPT

## 2020-11-23 PROCEDURE — 90792 PSYCH DIAG EVAL W/MED SRVCS: CPT | Mod: 95

## 2020-11-23 PROCEDURE — 71045 X-RAY EXAM CHEST 1 VIEW: CPT | Mod: 26

## 2020-11-23 PROCEDURE — 93005 ELECTROCARDIOGRAM TRACING: CPT

## 2020-11-23 PROCEDURE — 99285 EMERGENCY DEPT VISIT HI MDM: CPT | Mod: 25

## 2020-11-23 PROCEDURE — 72125 CT NECK SPINE W/O DYE: CPT | Mod: 26

## 2020-11-23 PROCEDURE — 70450 CT HEAD/BRAIN W/O DYE: CPT

## 2020-11-23 PROCEDURE — 72125 CT NECK SPINE W/O DYE: CPT

## 2020-11-23 PROCEDURE — 87086 URINE CULTURE/COLONY COUNT: CPT

## 2020-11-23 PROCEDURE — 96375 TX/PRO/DX INJ NEW DRUG ADDON: CPT

## 2020-11-23 PROCEDURE — 84702 CHORIONIC GONADOTROPIN TEST: CPT

## 2020-11-23 PROCEDURE — 85025 COMPLETE CBC W/AUTO DIFF WBC: CPT

## 2020-11-23 PROCEDURE — 70450 CT HEAD/BRAIN W/O DYE: CPT | Mod: 26

## 2020-11-23 PROCEDURE — 99285 EMERGENCY DEPT VISIT HI MDM: CPT

## 2020-11-23 PROCEDURE — 81001 URINALYSIS AUTO W/SCOPE: CPT

## 2020-11-23 PROCEDURE — 80307 DRUG TEST PRSMV CHEM ANLYZR: CPT

## 2020-11-23 PROCEDURE — 80053 COMPREHEN METABOLIC PANEL: CPT

## 2020-11-23 RX ORDER — SODIUM CHLORIDE 9 MG/ML
1000 INJECTION INTRAMUSCULAR; INTRAVENOUS; SUBCUTANEOUS ONCE
Refills: 0 | Status: COMPLETED | OUTPATIENT
Start: 2020-11-23 | End: 2020-11-23

## 2020-11-23 RX ORDER — ONDANSETRON 8 MG/1
4 TABLET, FILM COATED ORAL ONCE
Refills: 0 | Status: COMPLETED | OUTPATIENT
Start: 2020-11-23 | End: 2020-11-23

## 2020-11-23 RX ORDER — THIAMINE MONONITRATE (VIT B1) 100 MG
500 TABLET ORAL ONCE
Refills: 0 | Status: COMPLETED | OUTPATIENT
Start: 2020-11-23 | End: 2020-11-23

## 2020-11-23 RX ORDER — FOLIC ACID 0.8 MG
1 TABLET ORAL ONCE
Refills: 0 | Status: COMPLETED | OUTPATIENT
Start: 2020-11-23 | End: 2020-11-23

## 2020-11-23 RX ADMIN — ONDANSETRON 4 MILLIGRAM(S): 8 TABLET, FILM COATED ORAL at 04:54

## 2020-11-23 RX ADMIN — Medication 50 MILLIGRAM(S): at 05:14

## 2020-11-23 RX ADMIN — SODIUM CHLORIDE 1000 MILLILITER(S): 9 INJECTION INTRAMUSCULAR; INTRAVENOUS; SUBCUTANEOUS at 04:53

## 2020-11-23 RX ADMIN — Medication 1 MILLIGRAM(S): at 04:54

## 2020-11-23 RX ADMIN — Medication 50 MILLIGRAM(S): at 15:00

## 2020-11-23 RX ADMIN — Medication 105 MILLIGRAM(S): at 05:14

## 2020-11-23 NOTE — ED PROVIDER NOTE - PROGRESS NOTE DETAILS
signout from dr millan. arrived to pt bedside and pt was sleeping comfortable. given etoh level projection will cont to monitor. -Baljit North MD- seen by clarence. [facility] will see pt. -Baljit North MD- seen by clarence.  psychiatry will see pt. private psychirist did not answer [phone] -Baljit North MD- cleared by psychiatry. will release to . -Baljit North MD-

## 2020-11-23 NOTE — ED BEHAVIORAL HEALTH ASSESSMENT NOTE - HPI (INCLUDE ILLNESS QUALITY, SEVERITY, DURATION, TIMING, CONTEXT, MODIFYING FACTORS, ASSOCIATED SIGNS AND SYMPTOMS)
36yo domiciled, , employed Polish American F with hx of alcohol use w/ hx of etoh w/d, unspecified depressive mood w/ no past SA/SIB, no past psych hospitalization. Once again presents after intoxication with etoh (BAL >200) and was also given some xanax by  leading her to have a fall. Patient was asked to be evaluated for reported possible SI made to  when she was drunk. Patient has been consistently denying SI/intent or plan since arrival.      Pt was seen and evaluate via telemonitor. Pt spoke of feeling stressed and worried during recent week because her  was ill and discovered to have suffered a "small stroke"; she was worried about his health abiola since he's still going to work during covid, worried that he would die and she wouldn't be able to pay mortgage if he . She reported chronic issues w/ intermittent depressive mood inc last week w/ ongoing insomnia but without anhedonia, insomnia, appetite loss, hopelessness, helplessness, SI/intent or plan. When asked if she made any suicidal statement to her  while she was intoxicated, she replied: "No! I love my life. My  knows I love my life." Patient identified multiple protective factors inc her , her 15yo son, her [employer] ced and her work. Patient spoke of immediate for going back to work tomorrow and following up her outpatient therapist this week.  Patient declined inpatient mental or substance treatment - reported that she was interested in outpt substance. Reported will follow up with outpatient therapist and psychiatrist.     Collateral from  - please see additional info from [facility]; clarified pt did not make any suicidal statements yesterday, has hx of making nonspecific SI when drunk but no hx of SA/SIB, without hx of SI endorsed when sober. Reported his major concern last night was that she fell. felt there was no psych safety issues w/ pt returning home - will pick her up today at 3pm

## 2020-11-23 NOTE — ED BEHAVIORAL HEALTH ASSESSMENT NOTE - SAFETY PLAN DETAILS
discussed about need to stop alcohol use; advised to return to Ed or contact 911 if dev worsening depressive symptoms inc SI

## 2020-11-23 NOTE — ED PROVIDER NOTE - ENMT, MLM
Airway patent, Nasal mucosa clear. Mouth with normal mucosa. Throat has no vesicles, no oropharyngeal exudates and uvula is midline.  Head normocephalic, atraumatic.

## 2020-11-23 NOTE — ED PROVIDER NOTE - CLINICAL SUMMARY MEDICAL DECISION MAKING FREE TEXT BOX
34yo F with hx alcohol abuse/withdrawal seizures presents with alcohol intoxication. Exam unremarkable, patient A+O x3, no tremors/diaphoresis/psychomotor agitation cw with withdrawal syndrome, denies active SI/HI. Will obtain ekg, labs, XR/CT head/cspine. given IVF, thiamine and folic acid. Given librium to prevent withdrawal symptoms. Will reassess. 36yo F with hx alcohol abuse/withdrawal seizures presents with alcohol intoxication. Exam unremarkable, patient A+O x3, no tremors/diaphoresis/psychomotor agitation cw with withdrawal syndrome, denies active SI/HI. Will obtain ekg, labs, XR/CT head/cspine. given IVF, thiamine and folic acid. Given librium to prevent withdrawal symptoms. Will reassess.    labs show mildly elevated AST/ALT/lipase, patient denies abdominal pain or vomiting. Patient eating without discomfort.  serum alcohol 285, CXR unremarkable  CT head:No acute intracranial hemorrhage or mass effect. CT cervical spine:No CT evidence of cervical spine fracture.  Discussed above with patient.  @730am on reeval 36yo F with hx alcohol abuse/withdrawal seizures presents with alcohol intoxication. Exam unremarkable, patient A+O x3, no tremors/diaphoresis/psychomotor agitation cw with withdrawal syndrome, denies active SI/HI. Will obtain ekg, labs, XR/CT head/cspine. given IVF, thiamine and folic acid. Given librium to prevent withdrawal symptoms. Will reassess.    labs show mildly elevated AST/ALT/lipase, patient denies abdominal pain or vomiting. Patient eating without discomfort.  serum alcohol 285, CXR unremarkable  CT head:No acute intracranial hemorrhage or mass effect. CT cervical spine:No CT evidence of cervical spine fracture.  Discussed above with patient.  @730am on reeval patient sleeping comfortably, when awakened patient continues to deny SI and after discussion with patient about 's concerns regarding her alcohol abuse. Patient insists she does not want to be admitted and would prefer outpatient detox. Will consult  for resources.  patient signedout to Dr. North at 730am.

## 2020-11-23 NOTE — ED BEHAVIORAL HEALTH ASSESSMENT NOTE - REFERRAL / APPOINTMENT DETAILS
follow up with therapist on Thursday 11/26/20 at 6pm; advised contact psychiatrist to move up monthly appointment from Dec

## 2020-11-23 NOTE — ED ADULT NURSE NOTE - INTERVENTIONS DEFINITIONS
Call bell, personal items and telephone within reach/Stretcher in lowest position, wheels locked, appropriate side rails in place/Provide visual cue, wrist band, yellow gown, etc./Monitor gait and stability/Monitor for mental status changes and reorient to person, place, and time/Reinforce activity limits and safety measures with patient and family/Instruct patient to call for assistance/Physically safe environment: no spills, clutter or unnecessary equipment/Room bathroom lighting operational/Non-slip footwear when patient is off stretcher/Provide visual clues: red socks

## 2020-11-23 NOTE — ED ADULT TRIAGE NOTE - CHIEF COMPLAINT QUOTE
as per ems report , pt's  called the ems for alcohol intoxication , she was drinking for 5 days straight  pt denies any complaints in triage as per ems report , pt's  called the ems for alcohol intoxication , she was drinking for 5 days straight  pt denies any complaints in triage, roam alert applied

## 2020-11-23 NOTE — ED BEHAVIORAL HEALTH ASSESSMENT NOTE - OTHER PAST PSYCHIATRIC HISTORY (INCLUDE DETAILS REGARDING ONSET, COURSE OF ILLNESS, INPATIENT/OUTPATIENT TREATMENT)
hx of unspecified depressive d/o, no psych hosp, no past SA/SIB; currently in weekly therapy, monthly psychiatry

## 2020-11-23 NOTE — ED BEHAVIORAL HEALTH NOTE - BEHAVIORAL HEALTH NOTE
===================  PRE-HOSPITAL COURSE  ===================  SOURCE:  RN/ED documentation   DETAILS:  Pt. BIB EMS, initiated by  for alcohol intoxication     ============  ED COURSE   ============  SOURCE:  RN/ED documentation   ARRIVAL:  EMS, unaccompanied   BELONGINGS:  no belongings of note   BEHAVIOR: Pt. arrived to ED alert and oriented, appearing well groomed; elevated BAL on arrival.  Pt. calm and cooperative, complied with triage processes no issue.  Pt. eye contact adequate, speech clear and coherent, thoughts logical and linear in nature.  Pt. denies SI/HI and AH/VH. She admits to drinking alcohol x 5days and that her  had given her Xanax which she also took last night.  She denies falling/hitting her head but  reported to ED that she had seizure like activity. In ED pt. presents with euthymic mood and congruent affect; has been resting/sleeping while awaiting evaluation.   TREATMENT:  Pt. given Librium 50mg, Folic gordy 1mg, Zofran 4mg IB push, Thiamine IV push   VISITORS:  no visitors present     ========================   COLLATERAL  ========================  NAME: Gallito Carter  NUMBER: [phone]  RELATIONSHIP:   RELIABILITY: limited, reliable   COMMENTS: Pt's  reports knowing pt. for 5 years.  He reports that she resides with him, and her 17 y/o son used to live with them but has left to live with his biological father a few months ago.  He shares pt. to have hx of anxiety and depression, w. prior hospitalization at [place] 4 months ago and [place] 2 months (unclear detox vs. psych).  He reports pt. to consume 1L of liquor daily.   indicates pt. follows with a psychiatrist and therapist, but cannot provide contact information at this time.  He expresses pt. is typically complaint with her prescribed Celexa, 20-25mg. He explains he is concerned on basis of pt. drinking habits and worries that she will do something to compromise herself while intoxicated.  He provides examples that a few months ago pt. threw herself down the basement stairs, takes her clothing off on their front lawn and urinating in front of neighbors, walks without caution into street in front of the home. However, denies that pt. has any prior SIB or SA; endorsing that pt. vaguely states "I don't want to live this life anymore", often when confronted about her drinking/behaviors. He denies that pt. made statements of SI or suicidal gestures precipitating this ED visit last night. Indicates that he was concerned about the fact that pt. had fallen and hit her head, describes that she was shaking and he thought she may have been seizing. Also denies that pt. has access to firearms in the home.  He is uncertain of pt. family mental health hx, indicating only that pt. does not have familial support.  He expresses wishes for pt. to engage in treatment, but understands this cannot be mandated; does not express acute safety concern at this time.  He relays that he can pick pt. up from ED around 3pm.        COVID Exposure Screen- collateral     1.        *Has the patient had a COVID-19 test in the last 21 days?  (  ) Yes   (X  ) No   (  ) Unknown- Reason: ______  IF YES PROCEED TO QUESTION #2. IF NO OR UNKNOWN THEN PLEASE SKIP TO QUESTION #3.  2.        Date of test: ________  3.        Do you know the result? (  ) Negative   (  ) Positive   (  ) No result available  4.        *In the past 14 days, has the patient been around anyone with a positive COVID-19 test?*  (  ) Yes   ( X ) No   (  ) Unknown- Reason (e.g. collateral uncertain, refusing to answer, etc.):  ______  IF YES PROCEED TO QUESTION #5. IF NO or UNKNOWN, PLEASE SKIP TO QUESTION #10  5.        Was the patient within 6 feet of them for at least 15 minutes? (  ) Yes   (  ) No   (  ) Unknown- Reason: ______   6.        Did the patient provide care for them? (  ) Yes   (  ) No   (  ) Unknown- Reason: ______   7.        Did the patient have direct physical contact with them (touched, hugged, or kissed them)? (  ) Yes   (  ) No    (  ) Unknown- Reason: ______   8.        Did the patient share eating or drinking utensils with them? (  ) Yes   (  ) No    (  ) Unknown- Reason: ______   9.        Have they sneezed, coughed, or somehow got respiratory droplets on the patient? (  ) Yes   (  ) No    (  ) Unknown- Reason: ______   10.     *Have you been out of [place] within the past 14 days?*  (  ) Yes   ( X ) No   (  ) Unknown- Reason (e.g. patient uncertain, sedated, refusing to answer, etc.): _______  IF YES PLEASE ANSWER THE FOLLOWING QUESTIONS:  11.     Which state/country have you been to? ______  12.     Were you there over 24 hours? (  ) Yes   (  ) No    (  ) Unknown- Reason: ______  13.     Date of return to [facility]: ______

## 2020-11-23 NOTE — ED BEHAVIORAL HEALTH NOTE - BEHAVIORAL HEALTH NOTE
COVID Exposure Screen- Patient     1.        *Have you had a COVID-19 test in the last 21 days?  ( x ) Yes   (  ) No   (  ) Unknown- Reason: ______  IF YES PROCEED TO QUESTION #2. IF NO OR UNKNOWN THEN PLEASE SKIP TO QUESTION #3.  2.        Date of test: __11/3/20______  3.        3. Do you know the result? ( x ) Negative   (  ) Positive   (  ) No result available  4.        *In the past 14 days, have you been around anyone with a positive COVID-19 test?*  (  ) Yes   (  x) No   (  ) Unknown- Reason (e.g. patient uncertain, sedated, refusing to answer, etc.):  ______  IF YES PROCEED TO QUESTION #5. IF NO or UNKNOWN, PLEASE SKIP TO QUESTION #10  5.        Were you within 6 feet of them for at least 15 minutes? (  ) Yes   (  ) No   (  ) Unknown- Reason: _____  6.        Have you provided care for them? (  ) Yes   (  ) No   (  ) Unknown- Reason: ______  7.        Have you had direct physical contact with them (touched, hugged, or kissed them)? (  ) Yes   (  ) No    (  ) Unknown- Reason: ___  8.        Have you shared eating or drinking utensils with them? (  ) Yes   (  ) No    (  ) Unknown- Reason: ____  9.        Have they sneezed, coughed, or somehow got respiratory droplets on you? (  ) Yes   (  ) No    (  ) Unknown- Reason: ______  10.     *Have you been out of [place] within the past 14 days?*  (  ) Yes   (x  ) No   (  ) Unknown- Reason (e.g. patient uncertain, sedated, refusing to answer, etc.): _______  IF YES PLEASE ANSWER THE FOLLOWING QUESTIONS:  11.     Which state/country have you been to? ______  12.     Were you there over 24 hours? (  ) Yes   (  ) No    (  ) Unknown- Reason: ______  13.     Date of return to [facility]: ______

## 2020-11-23 NOTE — ED PROVIDER NOTE - NSFOLLOWUPINSTRUCTIONS_ED_ALL_ED_FT
Abuse of Alcohol    WHAT YOU NEED TO KNOW:    Alcohol abuse means you drink more than the recommended daily or weekly limits. You may be drinking alcohol regularly or drinking large amounts in a short period of time (binge drinking). You continue to drink even though it causes legal, work, or relationship problems.    DISCHARGE INSTRUCTIONS:    Call your local emergency number (911 in the ) for any of the following:   •You have sudden chest pain or trouble breathing.      •You want to harm yourself or others.      •You have a seizure or have shaking or trembling.      Call your doctor if:   •You have hallucinations (you see or hear things that are not real).      •You cannot stop vomiting or you vomit blood.      •You need help to stop drinking alcohol.       •You have questions or concerns about your condition or care.      Medicines:   •Vitamin supplements may be given to treat low vitamin levels. Alcohol can make it hard for your body to absorb enough vitamins such as B1. Vitamin supplements may also be given to prevent alcohol related brain damage.       •Take your medicine as directed. Contact your healthcare provider if you think your medicine is not helping or if you have side effects. Tell him or her if you are allergic to any medicine. Keep a list of the medicines, vitamins, and herbs you take. Include the amounts, and when and why you take them. Bring the list or the pill bottles to follow-up visits. Carry your medicine list with you in case of an emergency.      Health problems alcohol abuse can cause:   •Cancer in your liver, pancreas, stomach, colon, kidney, or breast      •Stroke or a heart attack      •Liver, kidney, or lung disease      •Blackouts, memory loss, brain damage, or dementia      •Diabetes, immune system problems, or thiamine (vitamin B1) deficiency      •Problems for you and your baby if you drink while pregnant      Recommended alcohol limits:   •Men 21 to 64 years should limit alcohol to 2 drinks a day. Do not have more than 4 drinks in 1 day or more than 14 in 1 week.      •All women, and men 65 or older should limit alcohol to 1 drink in a day. Do not have more than 3 drinks in 1 day or more than 7 in 1 week. No amount of alcohol is okay during pregnancy.      Manage alcohol use:   •Decrease the amount you drink. This can help prevent health problems such as brain, heart, and liver damage, high blood pressure, diabetes, and cancer. If you cannot stop completely, healthcare providers can help you set goals to decrease the amount you drink.      •Plan weekly alcohol use. You will be less likely to drink more than the recommended limit if you plan ahead.      •Have food when you drink alcohol. Food will prevent alcohol from getting into your system too quickly. Eat before you have your first alcohol drink.      •Time your drinks carefully. Have no more than 1 drink in an hour. Have a liquid such as water, coffee, or a soft drink between alcohol drinks.      •Do not drive if you have had alcohol. Make sure someone who has not been drinking can help you get home.      •Do not drink alcohol if you are taking medicine. Alcohol is dangerous when you combine it with certain medicines, such as acetaminophen or blood pressure medicine. Talk to your healthcare provider about all the medicines you currently take.      Follow up with your healthcare provider as directed: Write down your questions so you remember to ask them during your visits.    For support and more information:   •Alcoholics Anonymous  Web Address: http://www.aa.org      •Substance Abuse and Mental Health Services Administration  [address]  [place] [address]  Web Address: http://www.samhsa.gov

## 2020-11-23 NOTE — ED BEHAVIORAL HEALTH ASSESSMENT NOTE - DESCRIPTION
No beh issues; has been denying SI/intent or plan since arrival denied [place], [place] age 10, GED,  x 1, 15yo

## 2020-11-23 NOTE — ED ADULT NURSE NOTE - OBJECTIVE STATEMENT
Pt presents to ED with c/o alcohol intoxication. Pt reports she was drinking alcohol, and she was told by her  she had a seizure. Pt denies any discomfort. Roam alert/yellow gown/red socks applied.

## 2020-11-23 NOTE — ED BEHAVIORAL HEALTH ASSESSMENT NOTE - SUICIDE PROTECTIVE FACTORS
Identifies reasons for living/Has future plans/Cultural, spiritual and/or moral attitudes against suicide/Engaged in work or school/Responsibility to family and others

## 2020-11-23 NOTE — CHART NOTE - NSCHARTNOTEFT_GEN_A_CORE
Referral received from ED for social work intervention with 35 y.o. female who presented for alcohol intoxication.    Met with patient at [facility] who appeared alert and well oriented. Patient reported that she lives her  and has 16 y.o. son from previous relationship who lives with his  biological father.   Patient stated that her   suffered a stroke this past  Saturday and that she was having difficult coping with his illness and decided to start drinking.  Stated that she has been sober for many months and has been seeing a psychiatrist and psychotherapist at the [facility] in [place].      At this time, patient is not interested any other substance treatment programs and wanted to continue with her out-pt treatment at [facility].     Patient  reported to EMS that patient verbalized suicidal ideation.  Patient has history of depression and anxiety disorder.   Patient was seen by psychiatrist and cleared for discharge home today.   Worker provided patient with  information on suicidal hotline and lifen[facility] .

## 2020-11-23 NOTE — ED PROVIDER NOTE - OBJECTIVE STATEMENT
36yo F with hx alcohol abuse/withdrawal seizures presents with alcohol intoxication. BIBEMS after her  called concerned about patient's drinking. per the patient she has been drinking tequila. Reports her  gave her xanax last night to put her to sleep so she would stop drinking alcohol. Patient denies passing out or having seizure. Denies suicidal ideation or homicidal ideation.   Per the  patient has been making suicidal statements while intoxicated, denies her making similar comments while sober. Reports she has been binging on alcohol for the past 5 days. Reports patient took 2-3 xanax-unknown dosage a few hours ago. Reports she fell and struck her head at 1am, he reports she was shaking and appeared to have had a seizure. Reports his wife "needs to be admitted to hospital."

## 2020-11-23 NOTE — ED PROVIDER NOTE - PATIENT PORTAL LINK FT
You can access the FollowMyHealth Patient Portal offered by [facility] by registering at the following website: [email or web address]. By joining [facility]’s FollowMyHealth portal, you will also be able to view your health information using other applications (apps) compatible with our system.

## 2020-11-23 NOTE — ED BEHAVIORAL HEALTH ASSESSMENT NOTE - SUMMARY
34yo domiciled, , employed F with hx of etoh use, w/ hx of unspecified depressive d/o w/ no past Sa/SIB, no past psych hospitalization who presented in context of intoxication after fall and was asked to be evaluated for possible suicidal statement that she might have made to her  while drunk. Patient has been consistently denying any SI//intent or plan since arrival in ED and continues to do so during the psych interview. Patient acknowledges recent increase in stress and worry because of her 's advanced age, ill health (inc possible minor stroke) and overarching concerns about covid in general. Yet she is not endorsing any exhibiting neuroveg s/sx consistent with a depressive episode. Patient presents without any manic or psychotic symptoms - linear thought process. Patient continues to be future oriented with specific immediate plan and future plan. Patient's  provide corroborating collateral of no acute psych safety concerns inc suicidality abiola since she's sobered up. Despite her chronic demo risk factors of self harm stemming from her ongoing substance use, her mood d/o, she has multiple protective factors inc lack of past SA/SIB, lack of current SI/intent, domiciled, , employed, her ced, engaged in tx, and future oriented in goals and plans. Patient's immediate risk factor of intoxication has been resolved by allowing her to sober up in the ED. Thus, there is no acute indication of imminent psych risk of harm to self to warrant or meet criteria for involuntary psych admission. Patient declines voluntary psych admission. Patient would most benefit from substance treatment but she's declining inpatient substance treatment. She is amenable for outpatient substance treatment referral and reports preference for continuing outpatient mental health services.

## 2020-11-23 NOTE — ED ADULT NURSE NOTE - CHIEF COMPLAINT QUOTE
as per ems report, pt's  called the ems for alcohol intoxication, she was drinking for 5 days straight  pt denies any complaints in triage, roam alert applied

## 2020-11-23 NOTE — ED PROVIDER NOTE - PRO INTERPRETER NEED 2
Problem: Skin Injury Risk (Adult)  Goal: Identify Related Risk Factors and Signs and Symptoms  Outcome: Ongoing (interventions implemented as appropriate)   12/07/18 0953   Skin Injury Risk (Adult)   Related Risk Factors (Skin Injury Risk) advanced age     Goal: Skin Health and Integrity  Outcome: Ongoing (interventions implemented as appropriate)   12/11/18 0948   Skin Injury Risk (Adult)   Skin Health and Integrity making progress toward outcome       Problem: Patient Care Overview  Goal: Plan of Care Review  Outcome: Ongoing (interventions implemented as appropriate)   12/07/18 0726 12/10/18 0425 12/11/18 0839   Coping/Psychosocial   Plan of Care Reviewed With --  --  patient   Coping/Psychosocial   Patient Agreement with Plan of Care agrees --  --    Plan of Care Review   Progress --  no change --        Problem: Hyperglycemia, Persistent (Adult)  Goal: Signs and Symptoms of Listed Potential Problems Will be Absent, Minimized or Managed (Hyperglycemia, Persistent)  Outcome: Ongoing (interventions implemented as appropriate)   12/09/18 0142   Goal/Outcome Evaluation   Problems Assessed (Hyperglycemia) all   Problems Present (Hyperglycemia) hyperglycemia       Problem: Nutrition, Parenteral (Adult)  Goal: Signs and Symptoms of Listed Potential Problems Will be Absent, Minimized or Managed (Nutrition, Parenteral)  Outcome: Ongoing (interventions implemented as appropriate)   12/10/18 1334   Goal/Outcome Evaluation   Problems Assessed (Parenteral Nutrition) all   Problems Present (Parenteral Nutrition) malnutrition;infection       Problem: Cardiac Output Decreased (Adult)  Goal: Identify Related Risk Factors and Signs and Symptoms  Outcome: Ongoing (interventions implemented as appropriate)   12/09/18 0142   Cardiac Output Decreased (Adult)   Related Risk Factors (Cardiac Output Decreased) disease process   Signs and Symptoms (Cardiac Output Decreased) weakness/activity intolerance;fatigue       Problem: Fall Risk  (Adult)  Goal: Identify Related Risk Factors and Signs and Symptoms  Outcome: Ongoing (interventions implemented as appropriate)   12/09/18 0142   Fall Risk (Adult)   Related Risk Factors (Fall Risk) age-related changes;confusion/agitation;fatigue/slow reaction;gait/mobility problems;sleep pattern alteration;environment unfamiliar     Goal: Absence of Fall  Outcome: Ongoing (interventions implemented as appropriate)   12/11/18 0948   Fall Risk (Adult)   Absence of Fall making progress toward outcome          English

## 2020-11-23 NOTE — ED BEHAVIORAL HEALTH ASSESSMENT NOTE - SAFETY PLAN ADDT'L DETAILS
Safety plan discussed with.../Education provided regarding environmental safety / lethal means restriction/Provision of National Suicide Prevention Lifeline 8-269-335-SOFT (2935)

## 2020-11-24 LAB
CULTURE RESULTS: SIGNIFICANT CHANGE UP
SPECIMEN SOURCE: SIGNIFICANT CHANGE UP

## 2020-11-25 NOTE — SBIRT NOTE ADULT - NSSBIRTALCPOSREINDET_GEN_A_CORE
Education and counseling provided around  patient's substance usage.     Patient will continue with her out-pt substance treatment at [facility].

## 2021-03-02 NOTE — ED ADULT NURSE NOTE - NS ED NOTE ABUSE SUSPICION NEGLECT YN
No
Continue Regimen: Doxycycline 100 mg bid x 2 more months, then recheck \\nPanoxyl wash\\nGlytone back spray
Detail Level: Zone
Initiate Treatment: Triamcinolone bid \\nVaniply ointment\\nZ bar\\nClobetasol scalp solution \\nT-sal OTC
Plan: Patient declines examination today - states improved.
Detail Level: Detailed

## 2021-03-19 NOTE — H&P ADULT - REASON FOR ADMISSION
ARMD OU- Discussed findings of exam in detail with the patient. - Discussed the chronic nature of this disease and limited treatment options. - Optos done previously shows pigment clumping OD- OCT done today OD shows drusen and progression. OS UTO  - Continue icaps daily- Continue Amsler Grid  to monitor Vision at home advised to call or come by office ASAP if any changes in vision are noted from today- Fluid noted at previously visit OS on dilated exam patient and patient's son does not wish to be seen by retinal consultant at this time - [place] has dropped off today explained to patient and son that ARMD has gotten worse since last visit - Continue to monitor- RTC A/S MARIA R OU / Guttata OU/ Ectropion LLL- Discussed diagnosis in detail with patient- Discussed signs and symptoms in detail with patient- Recommend drinking plenty of water and starting Beaver 3's- D/C Systane OU PRN- STRESSED Lubrication through out the day as much as possible - Ectropion noted today LLL- D/C Lotemax SM BID OS.- Continue to monitor-----------------------------previous notes ----------------------------POAG OU- Discussed diagnosis in detail with patient- Discussed signs and symptoms of progression advised to call or come in ASAP if any changes in vision noted from today- IOP done today 18 OU- Cup to Disc noted at 0.7 OD and 0.9 OS. - OCT done today OD shows Borderline Superior RNFL thinning and OS UTO - VF done in the past OD shows Inferior / Nasal step and OS shows Inferior. Cannot remember in future due to decreased cognitive status. - Continue Combigan BID OU- Continue Lumigan 0.01% OU- Continue to monitor closely- Patient states that she does not want to come back unless she hurts states that she is 80years old and does not want to go through all of this but states that she will keep taking her drops. PVD OU- Discussed findings of exam in detail with the patient. - The risk of retinal detachment in patients with PVDs was discussed with the patient and the warning signs of retinal detachment were carefully reviewed with the patient. - The patient was warned to return to the office or contact the ophthalmologist on call immediately if they experience signs of retinal detachment. - Continue to monitorPseudophakia OU with PCO OD - Discussed diagnosis in detail with patient- Trace PCO noted OD but stable and no treatment needed at this time - Continue to monitorHx of SLT OS w/ Dr. Lane Mera - Discussed diagnosis in detail with patient - Will call and speak to Dr. Lane Mera and will call patient back today- Continue to monitor Presbyopia OU- Discussed diagnosis in detail with patient- new glasses RX given today - Continue to monitor; 's Notes: MR  10/28/19 by Elie Goldberg  9/24/19OCT disc/mac  3/19/21VF 1/18/18Optos 5/17/17Gonio- PVM- 2/7/18
Alcohol intoxication
Was done for less than one hour

## 2021-04-15 NOTE — ED ADULT NURSE NOTE - NSSUHOSCREENINGYN_ED_ALL_ED
This is a 72-year-old female with a history of CVA, atrial fibrillation status post ablation on Eliquis, GERD, nondysplastic short segment Ramos's, gas and bloating, diarrhea, and adenomatous colon polyps due surveillance in 2022 presenting for evaluation of hemorrhoids.  She was last seen 12/17/2019.  She was taking pantoprazole twice a day and continued to report heartburn every day.  Lifestyle modifications were discussed.  She was scheduled for an EGD; results below.  She was complaining of gas and bloating.  It was suspected she may have lactose intolerance.  She was instructed to avoid dairy products.  For functional diarrhea, daily fiber was recommended.  She reports onset of a sensation of perianal swelling further described as "a carrot or something is stuck" with proctalgia week ago.  Proctalgia was worse on 4/10 and she had an episode of red blood dripping into the toilet water on 4/11.  This represented isolated event.  She has persistent proctalgia associated with sitting, walking, and with a bowel movement.  She has a persistent sensation of perianal swelling.  She has experienced some improvement after starting sitz bath's and using an over-the-counter topical preparation that included lidocaine.  She has been having a soft bowel movement every day.  For the last 2 months, she reports fecal incontinence of a variable amount occurring at random.  She has just restarted Benefiber 1 tablespoon daily.  She took a single dose of Colace once because she thought she may be constipated.  She has been on iron supplements for the last week due to abnormal labs performed prior to shoulder surgery 9 days ago.  She has been taking pain medication for shoulder pain for the last 2 months.  She has acid reflux that is controlled with medication.  She denies other abdominal symptoms.  She had labs performed with her rheumatologist, Dr. Lemon, yesterday. No - the patient is unable to be screened due to medical condition

## 2021-04-20 ENCOUNTER — EMERGENCY (EMERGENCY)
Facility: HOSPITAL | Age: 36
LOS: 1 days | Discharge: HOME | End: 2021-04-20
Attending: STUDENT IN AN ORGANIZED HEALTH CARE EDUCATION/TRAINING PROGRAM
Payer: MEDICAID

## 2021-04-20 VITALS
HEART RATE: 110 BPM | RESPIRATION RATE: 22 BRPM | OXYGEN SATURATION: 96 % | DIASTOLIC BLOOD PRESSURE: 92 MMHG | SYSTOLIC BLOOD PRESSURE: 141 MMHG | HEIGHT: 65 IN | TEMPERATURE: 98 F

## 2021-04-20 LAB
ALBUMIN SERPL ELPH-MCNC: 3.5 G/DL — SIGNIFICANT CHANGE UP (ref 3.5–5)
ALP SERPL-CCNC: 61 U/L — SIGNIFICANT CHANGE UP (ref 40–120)
ALT FLD-CCNC: 67 U/L DA — HIGH (ref 10–60)
ANION GAP SERPL CALC-SCNC: 5 MMOL/L — SIGNIFICANT CHANGE UP (ref 5–17)
APAP SERPL-MCNC: <2 UG/ML — LOW (ref 10–30)
AST SERPL-CCNC: 31 U/L — SIGNIFICANT CHANGE UP (ref 10–40)
BASOPHILS # BLD AUTO: 0.07 K/UL — SIGNIFICANT CHANGE UP (ref 0–0.2)
BASOPHILS NFR BLD AUTO: 0.7 % — SIGNIFICANT CHANGE UP (ref 0–2)
BILIRUB SERPL-MCNC: <0.1 MG/DL — LOW (ref 0.2–1.2)
BUN SERPL-MCNC: 8 MG/DL — SIGNIFICANT CHANGE UP (ref 7–18)
CALCIUM SERPL-MCNC: 8.2 MG/DL — LOW (ref 8.4–10.5)
CHLORIDE SERPL-SCNC: 110 MMOL/L — HIGH (ref 96–108)
CO2 SERPL-SCNC: 28 MMOL/L — SIGNIFICANT CHANGE UP (ref 22–31)
CREAT SERPL-MCNC: 0.66 MG/DL — SIGNIFICANT CHANGE UP (ref 0.5–1.3)
EOSINOPHIL # BLD AUTO: 0.17 K/UL — SIGNIFICANT CHANGE UP (ref 0–0.5)
EOSINOPHIL NFR BLD AUTO: 1.8 % — SIGNIFICANT CHANGE UP (ref 0–6)
ETHANOL SERPL-MCNC: 473 MG/DL — HIGH (ref 0–10)
GLUCOSE SERPL-MCNC: 95 MG/DL — SIGNIFICANT CHANGE UP (ref 70–99)
HCT VFR BLD CALC: 42.8 % — SIGNIFICANT CHANGE UP (ref 34.5–45)
HGB BLD-MCNC: 14.5 G/DL — SIGNIFICANT CHANGE UP (ref 11.5–15.5)
IMM GRANULOCYTES NFR BLD AUTO: 0.3 % — SIGNIFICANT CHANGE UP (ref 0–1.5)
LYMPHOCYTES # BLD AUTO: 3.75 K/UL — HIGH (ref 1–3.3)
LYMPHOCYTES # BLD AUTO: 39.8 % — SIGNIFICANT CHANGE UP (ref 13–44)
MAGNESIUM SERPL-MCNC: 2 MG/DL — SIGNIFICANT CHANGE UP (ref 1.6–2.6)
MCHC RBC-ENTMCNC: 32.2 PG — SIGNIFICANT CHANGE UP (ref 27–34)
MCHC RBC-ENTMCNC: 33.9 GM/DL — SIGNIFICANT CHANGE UP (ref 32–36)
MCV RBC AUTO: 94.9 FL — SIGNIFICANT CHANGE UP (ref 80–100)
MONOCYTES # BLD AUTO: 0.93 K/UL — HIGH (ref 0–0.9)
MONOCYTES NFR BLD AUTO: 9.9 % — SIGNIFICANT CHANGE UP (ref 2–14)
NEUTROPHILS # BLD AUTO: 4.47 K/UL — SIGNIFICANT CHANGE UP (ref 1.8–7.4)
NEUTROPHILS NFR BLD AUTO: 47.5 % — SIGNIFICANT CHANGE UP (ref 43–77)
NRBC # BLD: 0 /100 WBCS — SIGNIFICANT CHANGE UP (ref 0–0)
PLATELET # BLD AUTO: 299 K/UL — SIGNIFICANT CHANGE UP (ref 150–400)
POTASSIUM SERPL-MCNC: 3.7 MMOL/L — SIGNIFICANT CHANGE UP (ref 3.5–5.3)
POTASSIUM SERPL-SCNC: 3.7 MMOL/L — SIGNIFICANT CHANGE UP (ref 3.5–5.3)
PROT SERPL-MCNC: 7.4 G/DL — SIGNIFICANT CHANGE UP (ref 6–8.3)
RBC # BLD: 4.51 M/UL — SIGNIFICANT CHANGE UP (ref 3.8–5.2)
RBC # FLD: 13 % — SIGNIFICANT CHANGE UP (ref 10.3–14.5)
SALICYLATES SERPL-MCNC: 3.7 MG/DL — SIGNIFICANT CHANGE UP (ref 2.8–20)
SODIUM SERPL-SCNC: 143 MMOL/L — SIGNIFICANT CHANGE UP (ref 135–145)
WBC # BLD: 9.42 K/UL — SIGNIFICANT CHANGE UP (ref 3.8–10.5)
WBC # FLD AUTO: 9.42 K/UL — SIGNIFICANT CHANGE UP (ref 3.8–10.5)

## 2021-04-20 PROCEDURE — 99285 EMERGENCY DEPT VISIT HI MDM: CPT

## 2021-04-20 RX ORDER — SODIUM CHLORIDE 9 MG/ML
1000 INJECTION INTRAMUSCULAR; INTRAVENOUS; SUBCUTANEOUS ONCE
Refills: 0 | Status: COMPLETED | OUTPATIENT
Start: 2021-04-20 | End: 2021-04-20

## 2021-04-20 RX ORDER — HALOPERIDOL DECANOATE 100 MG/ML
5 INJECTION INTRAMUSCULAR ONCE
Refills: 0 | Status: COMPLETED | OUTPATIENT
Start: 2021-04-20 | End: 2021-04-20

## 2021-04-20 RX ADMIN — SODIUM CHLORIDE 1000 MILLILITER(S): 9 INJECTION INTRAMUSCULAR; INTRAVENOUS; SUBCUTANEOUS at 20:06

## 2021-04-20 RX ADMIN — HALOPERIDOL DECANOATE 5 MILLIGRAM(S): 100 INJECTION INTRAMUSCULAR at 20:06

## 2021-04-20 RX ADMIN — Medication 2 MILLIGRAM(S): at 20:06

## 2021-04-20 NOTE — ED PROVIDER NOTE - OBJECTIVE STATEMENT
36F, pmh of alcohol abuse/withdrawal seizures presents with alcohol intoxication. patient unable to provide detailed history. EMS reports patient has been drinking alcohol all day and had a seizure earlier today.

## 2021-04-20 NOTE — ED ADULT TRIAGE NOTE - CHIEF COMPLAINT QUOTE
Patient BIBA for witnessed seizure and fall by , sustained head injury during fall from standing  hx HTN, depression, ETOH use, past 4 days was drinking heavy  noncompliant with Celexa as per EMS  On field, blood glucose 111, 200mL normal saline via LT AC 20G  : Gallitoneda Allenjessica [phone]

## 2021-04-20 NOTE — ED PROVIDER NOTE - PATIENT PORTAL LINK FT
You can access the FollowMyHealth Patient Portal offered by [facility] by registering at the following website: [email or web address]. By joining [employer]’s FollowMyHealth portal, you will also be able to view your health information using other applications (apps) compatible with our system.

## 2021-04-20 NOTE — ED PROVIDER NOTE - PHYSICAL EXAMINATION
General: agitated female, moderate distress   HEENT: normocephalic, atraumatic   Respiratory: normal work of breathing, lungs clear to auscultation bilaterally   Cardiac: tachycardic   Abdomen: soft, non-tender, no guarding or rebound   MSK: no swelling or tenderness of lower extremities, moving all extremities spontaneously   Skin: warm, dry   Neuro: awake, alert  Psych: agitated

## 2021-04-20 NOTE — ED PROVIDER NOTE - CLINICAL SUMMARY MEDICAL DECISION MAKING FREE TEXT BOX
36F presenting for alcohol intoxication and seizure. patient agitated, yelling, unable to de-escalate. patient given haldol and ativan to facilitate medical care. unclear if symptoms 2/2 intoxication vs postictal. will get labs and reassess when clinically sober.

## 2021-04-20 NOTE — ED PROVIDER NOTE - PROGRESS NOTE DETAILS
left message for patients  to get collateral. Silvano Pal Samuel KLEIN: Received sign out from Dr. Pal. Pt sleeping, responsive to verbal and tactile stimuli. No current distress. Will continue to monitor. Resting no distress, Pox 99% Pt states she is ok, HR is 112. Pt with chronic ETOH abuse, I will give Librium 50mg to prevent withdrawal symptoms. Pt is clinically sober. Denies suicidal ideation, refused detox, denies being un-domiciled, refused  consult.    will accompany pt home.  Pt is well appearing, has no new complaints and able to walk with normal gait. Pt is stable for discharge and follow up with medical doctor. Pt educated on care and need for follow up. Discussed anticipatory guidance and return precautions. Questions answered. I had a detailed discussion with the patient and/or guardian regarding the historical points, exam findings, and any diagnostic results supporting the discharge diagnosis. Pt states she is "ok".  HR is 112. Pt with chronic ETOH abuse, I will give Librium 50mg to prevent withdrawal symptoms.

## 2021-04-21 VITALS
DIASTOLIC BLOOD PRESSURE: 78 MMHG | HEART RATE: 89 BPM | SYSTOLIC BLOOD PRESSURE: 122 MMHG | RESPIRATION RATE: 18 BRPM | TEMPERATURE: 98 F | OXYGEN SATURATION: 99 %

## 2021-04-21 PROCEDURE — 99285 EMERGENCY DEPT VISIT HI MDM: CPT | Mod: 25

## 2021-04-21 PROCEDURE — 96361 HYDRATE IV INFUSION ADD-ON: CPT

## 2021-04-21 PROCEDURE — 80307 DRUG TEST PRSMV CHEM ANLYZR: CPT

## 2021-04-21 PROCEDURE — 96375 TX/PRO/DX INJ NEW DRUG ADDON: CPT

## 2021-04-21 PROCEDURE — 80053 COMPREHEN METABOLIC PANEL: CPT

## 2021-04-21 PROCEDURE — 96374 THER/PROPH/DIAG INJ IV PUSH: CPT

## 2021-04-21 PROCEDURE — 83735 ASSAY OF MAGNESIUM: CPT

## 2021-04-21 PROCEDURE — 85025 COMPLETE CBC W/AUTO DIFF WBC: CPT

## 2021-04-21 PROCEDURE — 82962 GLUCOSE BLOOD TEST: CPT

## 2021-04-21 PROCEDURE — 36415 COLL VENOUS BLD VENIPUNCTURE: CPT

## 2021-04-21 RX ADMIN — Medication 50 MILLIGRAM(S): at 05:36

## 2021-04-21 RX ADMIN — SODIUM CHLORIDE 1000 MILLILITER(S): 9 INJECTION INTRAMUSCULAR; INTRAVENOUS; SUBCUTANEOUS at 05:30

## 2021-04-21 NOTE — ED ADULT NURSE NOTE - CHIEF COMPLAINT QUOTE
Patient BIBA for witnessed seizure and fall by , sustained head injury during fall from standing  hx HTN, depression, ETOH use, past 4 days was drinking heavy  noncompliant with Celexa as per EMS  On field, blood glucose 111, 200mL normal saline via LT AC 20G  : Gallitoneda Allenjessica [phone]

## 2021-04-21 NOTE — ED ADULT NURSE NOTE - NSIMPLEMENTINTERV_GEN_ALL_ED
Implemented All Fall Risk Interventions:  [place] to call system. Call bell, personal items and telephone within reach. Instruct patient to call for assistance. Room bathroom lighting operational. Non-slip footwear when patient is off stretcher. Physically safe environment: no spills, clutter or unnecessary equipment. Stretcher in lowest position, wheels locked, appropriate side rails in place. Provide visual cue, wrist band, yellow gown, etc. Monitor gait and stability. Monitor for mental status changes and reorient to person, place, and time. Review medications for side effects contributing to fall risk. Reinforce activity limits and safety measures with patient and family.

## 2021-06-12 NOTE — ED PROVIDER NOTE - PMH
64 y/o F w/ uncontrolled Type 2 DM w/ hyper and hypoglycemia A1c 10.6%, HTN, HLD, Hypothyroidism admitted for chemo for newly diagnosed AML. Alcohol abuse    Depression no abrasion/no back pain/no numbness/no tingling

## 2021-07-19 ENCOUNTER — INPATIENT (INPATIENT)
Facility: HOSPITAL | Age: 36
LOS: 1 days | Discharge: LEFT BEFORE DISCHARGE | DRG: 894 | End: 2021-07-21
Attending: INTERNAL MEDICINE | Admitting: INTERNAL MEDICINE
Payer: MEDICAID

## 2021-07-19 VITALS
RESPIRATION RATE: 18 BRPM | HEIGHT: 65 IN | HEART RATE: 63 BPM | DIASTOLIC BLOOD PRESSURE: 69 MMHG | TEMPERATURE: 98 F | WEIGHT: 190.04 LBS | SYSTOLIC BLOOD PRESSURE: 131 MMHG | OXYGEN SATURATION: 97 %

## 2021-07-19 DIAGNOSIS — F10.239 ALCOHOL DEPENDENCE WITH WITHDRAWAL, UNSPECIFIED: ICD-10-CM

## 2021-07-19 DIAGNOSIS — R74.01 ELEVATION OF LEVELS OF LIVER TRANSAMINASE LEVELS: ICD-10-CM

## 2021-07-19 DIAGNOSIS — Z29.9 ENCOUNTER FOR PROPHYLACTIC MEASURES, UNSPECIFIED: ICD-10-CM

## 2021-07-19 DIAGNOSIS — F43.10 POST-TRAUMATIC STRESS DISORDER, UNSPECIFIED: ICD-10-CM

## 2021-07-19 LAB
ALBUMIN SERPL ELPH-MCNC: 3.4 G/DL — LOW (ref 3.5–5)
ALBUMIN SERPL ELPH-MCNC: 3.4 G/DL — LOW (ref 3.5–5)
ALP SERPL-CCNC: 56 U/L — SIGNIFICANT CHANGE UP (ref 40–120)
ALP SERPL-CCNC: 59 U/L — SIGNIFICANT CHANGE UP (ref 40–120)
ALT FLD-CCNC: 61 U/L DA — HIGH (ref 10–60)
ALT FLD-CCNC: 64 U/L DA — HIGH (ref 10–60)
AMPHET UR-MCNC: NEGATIVE — SIGNIFICANT CHANGE UP
ANION GAP SERPL CALC-SCNC: 5 MMOL/L — SIGNIFICANT CHANGE UP (ref 5–17)
ANION GAP SERPL CALC-SCNC: 9 MMOL/L — SIGNIFICANT CHANGE UP (ref 5–17)
APPEARANCE UR: CLEAR — SIGNIFICANT CHANGE UP
AST SERPL-CCNC: 35 U/L — SIGNIFICANT CHANGE UP (ref 10–40)
AST SERPL-CCNC: 41 U/L — HIGH (ref 10–40)
BARBITURATES UR SCN-MCNC: NEGATIVE — SIGNIFICANT CHANGE UP
BASOPHILS # BLD AUTO: 0.05 K/UL — SIGNIFICANT CHANGE UP (ref 0–0.2)
BASOPHILS NFR BLD AUTO: 0.5 % — SIGNIFICANT CHANGE UP (ref 0–2)
BENZODIAZ UR-MCNC: POSITIVE
BILIRUB DIRECT SERPL-MCNC: <0.1 MG/DL — SIGNIFICANT CHANGE UP (ref 0–0.2)
BILIRUB INDIRECT FLD-MCNC: >0.1 MG/DL — LOW (ref 0.2–1)
BILIRUB SERPL-MCNC: 0.2 MG/DL — SIGNIFICANT CHANGE UP (ref 0.2–1.2)
BILIRUB SERPL-MCNC: 0.2 MG/DL — SIGNIFICANT CHANGE UP (ref 0.2–1.2)
BILIRUB UR-MCNC: NEGATIVE — SIGNIFICANT CHANGE UP
BUN SERPL-MCNC: 8 MG/DL — SIGNIFICANT CHANGE UP (ref 7–18)
BUN SERPL-MCNC: 9 MG/DL — SIGNIFICANT CHANGE UP (ref 7–18)
CALCIUM SERPL-MCNC: 8.1 MG/DL — LOW (ref 8.4–10.5)
CALCIUM SERPL-MCNC: 8.5 MG/DL — SIGNIFICANT CHANGE UP (ref 8.4–10.5)
CHLORIDE SERPL-SCNC: 106 MMOL/L — SIGNIFICANT CHANGE UP (ref 96–108)
CHLORIDE SERPL-SCNC: 109 MMOL/L — HIGH (ref 96–108)
CO2 SERPL-SCNC: 25 MMOL/L — SIGNIFICANT CHANGE UP (ref 22–31)
CO2 SERPL-SCNC: 28 MMOL/L — SIGNIFICANT CHANGE UP (ref 22–31)
COCAINE METAB.OTHER UR-MCNC: NEGATIVE — SIGNIFICANT CHANGE UP
COLOR SPEC: YELLOW — SIGNIFICANT CHANGE UP
CREAT SERPL-MCNC: 0.49 MG/DL — LOW (ref 0.5–1.3)
CREAT SERPL-MCNC: 0.56 MG/DL — SIGNIFICANT CHANGE UP (ref 0.5–1.3)
DIFF PNL FLD: NEGATIVE — SIGNIFICANT CHANGE UP
EOSINOPHIL # BLD AUTO: 0.19 K/UL — SIGNIFICANT CHANGE UP (ref 0–0.5)
EOSINOPHIL NFR BLD AUTO: 1.9 % — SIGNIFICANT CHANGE UP (ref 0–6)
ETHANOL SERPL-MCNC: 342 MG/DL — HIGH (ref 0–10)
GLUCOSE SERPL-MCNC: 106 MG/DL — HIGH (ref 70–99)
GLUCOSE SERPL-MCNC: 93 MG/DL — SIGNIFICANT CHANGE UP (ref 70–99)
GLUCOSE UR QL: NEGATIVE — SIGNIFICANT CHANGE UP
HCG SERPL-ACNC: <1 MIU/ML — SIGNIFICANT CHANGE UP
HCG UR QL: NEGATIVE — SIGNIFICANT CHANGE UP
HCT VFR BLD CALC: 43.3 % — SIGNIFICANT CHANGE UP (ref 34.5–45)
HGB BLD-MCNC: 14.5 G/DL — SIGNIFICANT CHANGE UP (ref 11.5–15.5)
IMM GRANULOCYTES NFR BLD AUTO: 0.4 % — SIGNIFICANT CHANGE UP (ref 0–1.5)
KETONES UR-MCNC: NEGATIVE — SIGNIFICANT CHANGE UP
LEUKOCYTE ESTERASE UR-ACNC: NEGATIVE — SIGNIFICANT CHANGE UP
LYMPHOCYTES # BLD AUTO: 3.98 K/UL — HIGH (ref 1–3.3)
LYMPHOCYTES # BLD AUTO: 39.4 % — SIGNIFICANT CHANGE UP (ref 13–44)
MAGNESIUM SERPL-MCNC: 2 MG/DL — SIGNIFICANT CHANGE UP (ref 1.6–2.6)
MCHC RBC-ENTMCNC: 30.7 PG — SIGNIFICANT CHANGE UP (ref 27–34)
MCHC RBC-ENTMCNC: 33.5 GM/DL — SIGNIFICANT CHANGE UP (ref 32–36)
MCV RBC AUTO: 91.7 FL — SIGNIFICANT CHANGE UP (ref 80–100)
METHADONE UR-MCNC: NEGATIVE — SIGNIFICANT CHANGE UP
MONOCYTES # BLD AUTO: 0.64 K/UL — SIGNIFICANT CHANGE UP (ref 0–0.9)
MONOCYTES NFR BLD AUTO: 6.3 % — SIGNIFICANT CHANGE UP (ref 2–14)
NEUTROPHILS # BLD AUTO: 5.19 K/UL — SIGNIFICANT CHANGE UP (ref 1.8–7.4)
NEUTROPHILS NFR BLD AUTO: 51.5 % — SIGNIFICANT CHANGE UP (ref 43–77)
NITRITE UR-MCNC: NEGATIVE — SIGNIFICANT CHANGE UP
NRBC # BLD: 0 /100 WBCS — SIGNIFICANT CHANGE UP (ref 0–0)
OPIATES UR-MCNC: NEGATIVE — SIGNIFICANT CHANGE UP
PCP SPEC-MCNC: SIGNIFICANT CHANGE UP
PCP UR-MCNC: NEGATIVE — SIGNIFICANT CHANGE UP
PH UR: 5 — SIGNIFICANT CHANGE UP (ref 5–8)
PHOSPHATE SERPL-MCNC: 3.2 MG/DL — SIGNIFICANT CHANGE UP (ref 2.5–4.5)
PLATELET # BLD AUTO: 296 K/UL — SIGNIFICANT CHANGE UP (ref 150–400)
POTASSIUM SERPL-MCNC: 3.7 MMOL/L — SIGNIFICANT CHANGE UP (ref 3.5–5.3)
POTASSIUM SERPL-MCNC: 4.3 MMOL/L — SIGNIFICANT CHANGE UP (ref 3.5–5.3)
POTASSIUM SERPL-SCNC: 3.7 MMOL/L — SIGNIFICANT CHANGE UP (ref 3.5–5.3)
POTASSIUM SERPL-SCNC: 4.3 MMOL/L — SIGNIFICANT CHANGE UP (ref 3.5–5.3)
PROT SERPL-MCNC: 7.2 G/DL — SIGNIFICANT CHANGE UP (ref 6–8.3)
PROT SERPL-MCNC: 7.4 G/DL — SIGNIFICANT CHANGE UP (ref 6–8.3)
PROT UR-MCNC: 30 MG/DL
RBC # BLD: 4.72 M/UL — SIGNIFICANT CHANGE UP (ref 3.8–5.2)
RBC # FLD: 13.1 % — SIGNIFICANT CHANGE UP (ref 10.3–14.5)
SARS-COV-2 RNA SPEC QL NAA+PROBE: SIGNIFICANT CHANGE UP
SODIUM SERPL-SCNC: 139 MMOL/L — SIGNIFICANT CHANGE UP (ref 135–145)
SODIUM SERPL-SCNC: 143 MMOL/L — SIGNIFICANT CHANGE UP (ref 135–145)
SP GR SPEC: 1.02 — SIGNIFICANT CHANGE UP (ref 1.01–1.02)
THC UR QL: NEGATIVE — SIGNIFICANT CHANGE UP
UROBILINOGEN FLD QL: NEGATIVE — SIGNIFICANT CHANGE UP
WBC # BLD: 10.09 K/UL — SIGNIFICANT CHANGE UP (ref 3.8–10.5)
WBC # FLD AUTO: 10.09 K/UL — SIGNIFICANT CHANGE UP (ref 3.8–10.5)

## 2021-07-19 PROCEDURE — 76705 ECHO EXAM OF ABDOMEN: CPT | Mod: 26

## 2021-07-19 PROCEDURE — 99285 EMERGENCY DEPT VISIT HI MDM: CPT

## 2021-07-19 PROCEDURE — 99222 1ST HOSP IP/OBS MODERATE 55: CPT | Mod: GC

## 2021-07-19 RX ORDER — SODIUM CHLORIDE 9 MG/ML
1000 INJECTION INTRAMUSCULAR; INTRAVENOUS; SUBCUTANEOUS
Refills: 0 | Status: DISCONTINUED | OUTPATIENT
Start: 2021-07-19 | End: 2021-07-21

## 2021-07-19 RX ORDER — ENOXAPARIN SODIUM 100 MG/ML
40 INJECTION SUBCUTANEOUS DAILY
Refills: 0 | Status: DISCONTINUED | OUTPATIENT
Start: 2021-07-19 | End: 2021-07-21

## 2021-07-19 RX ORDER — THIAMINE MONONITRATE (VIT B1) 100 MG
500 TABLET ORAL DAILY
Refills: 0 | Status: DISCONTINUED | OUTPATIENT
Start: 2021-07-19 | End: 2021-07-20

## 2021-07-19 RX ORDER — THIAMINE MONONITRATE (VIT B1) 100 MG
300 TABLET ORAL DAILY
Refills: 0 | Status: DISCONTINUED | OUTPATIENT
Start: 2021-07-19 | End: 2021-07-19

## 2021-07-19 RX ORDER — NICOTINE POLACRILEX 2 MG
1 GUM BUCCAL DAILY
Refills: 0 | Status: DISCONTINUED | OUTPATIENT
Start: 2021-07-19 | End: 2021-07-21

## 2021-07-19 RX ORDER — FOLIC ACID 0.8 MG
1 TABLET ORAL DAILY
Refills: 0 | Status: DISCONTINUED | OUTPATIENT
Start: 2021-07-19 | End: 2021-07-21

## 2021-07-19 RX ORDER — HALOPERIDOL DECANOATE 100 MG/ML
1 INJECTION INTRAMUSCULAR EVERY 12 HOURS
Refills: 0 | Status: DISCONTINUED | OUTPATIENT
Start: 2021-07-19 | End: 2021-07-21

## 2021-07-19 RX ORDER — HALOPERIDOL DECANOATE 100 MG/ML
5 INJECTION INTRAMUSCULAR ONCE
Refills: 0 | Status: COMPLETED | OUTPATIENT
Start: 2021-07-19 | End: 2021-07-19

## 2021-07-19 RX ADMIN — Medication 50 MILLIGRAM(S): at 11:16

## 2021-07-19 RX ADMIN — HALOPERIDOL DECANOATE 5 MILLIGRAM(S): 100 INJECTION INTRAMUSCULAR at 04:07

## 2021-07-19 RX ADMIN — Medication 2 MILLIGRAM(S): at 22:15

## 2021-07-19 RX ADMIN — SODIUM CHLORIDE 70 MILLILITER(S): 9 INJECTION INTRAMUSCULAR; INTRAVENOUS; SUBCUTANEOUS at 18:53

## 2021-07-19 RX ADMIN — Medication 2 MILLIGRAM(S): at 13:29

## 2021-07-19 RX ADMIN — Medication 2 MILLIGRAM(S): at 18:53

## 2021-07-19 RX ADMIN — Medication 2 MILLIGRAM(S): at 04:07

## 2021-07-19 NOTE — ED ADULT TRIAGE NOTE - CHIEF COMPLAINT QUOTE
biba from home as per ems she was drinking continuosly x 5 days that's why the  called 911 biba from home as per ems she was drinking continuosly x 5 days that's why the  called 911, Became combative after triage

## 2021-07-19 NOTE — H&P ADULT - NSHPREVIEWOFSYSTEMS_GEN_ALL_CORE
REVIEW OF SYSTEMS:    CONSTITUTIONAL: No weakness, fevers or chills  EYES/ENT: No visual changes;  No vertigo or throat pain   NECK: No pain or stiffness  RESPIRATORY: No cough, wheezing, or shortness of breath  CARDIOVASCULAR: No chest pain or palpitations  GASTROINTESTINAL: No abdominal or epigastric pain. No nausea or vomiting. No diarrhea or constipation.   GENITOURINARY: No dysuria, frequency or hematuria  NEUROLOGICAL: No numbness or weakness  SKIN: No itching, rashes

## 2021-07-19 NOTE — ED ADULT NURSE NOTE - ED STAT RN HANDOFF DETAILS 3
pt.remained  stable. denies pain. admitted to [facility] for alcohol withdrawal. transfer to  502 report given to jamaal rosales. not in distress

## 2021-07-19 NOTE — H&P ADULT - ATTENDING COMMENTS
36 year old female with noted PMH of PTSD/Depression, alcohol abuse, was brought into the emergency department by emergency services. Her  was concerned as the pt had been  drinking 6-7 drinks per day for the last 5 days, and stated that patient had had shaking episodes at home, therefore called EMS. On arrival EMS did not see any seizure-like activity and patient was punching/kicking/agitated on scene, no post-ictal phase noted. Patient was admitted for alcohol withdrawal despite active intoxication, per patient her last drink was yesterday afternoon. Pt seen and examined at bedside in ED, at which time she is somnolent and does not further contribute to history taking, however able to answer yes/no to closed questioning and one-word answers.      In the ED pt received 2 mg of Ativan, 50mg of Librium and 5 mg of Haldol. Pt was slightly tachycardiac with HR of 115, with a BP ranging from (131//89). Pt was sleeping comfortably in bed at the time of examination. CIWA on exam was 0    PMH as above  PSH denies  Meds denies  Allergies denies  Social Hx: drinks at least 6-8 drinks of hard alcohol; smokes 1/2 ppd for 15 years; denies drug use; when asking if patient is interested in quitting both alcohol abuse and smoking, patient says yes  Family Hx: does not elaborate    Vital Signs Last 24 Hrs  T(C): 37.4 (19 Jul 2021 15:05), Max: 37.4 (19 Jul 2021 15:05)  T(F): 99.3 (19 Jul 2021 15:05), Max: 99.3 (19 Jul 2021 15:05)  HR: 130 (19 Jul 2021 15:05) (63 - 136)  BP: 129/89 (19 Jul 2021 15:05) (123/88 - 146/86)  RR: 18 (19 Jul 2021 15:05) (18 - 18)  SpO2: 98% (19 Jul 2021 15:05) (96% - 99%)    Physical Exam:  General: somnolent, no acute distress, sleeping on her side in ED stretcher  HEENT: normocephalic, atraumatic, conjunctiva and sclera clear  Neck: supple, no JVD  CVS: tachycardic, normal S1, S2, no appreciable murmurs, gallops, or rubs  Lungs: clear to auscultation bilaterally, no wheezing or rhonchi noted  Abdomen: soft, nontender, nondistended, bowel sounds present  Extremities: no cyanosis, no clubbing no edema  Skin: no rashes/lesions noted  Neuro: somnolent, respond to name however quickly falls asleep during questioning, spontaneously moving all extremities    A/P:  Alcohol withdrawal syndrome  History of alcohol abuse  History of seizures per   Hepatic steatosis  Transaminitis  Active smoker        Continue with CIWA monitoring  Can continue with telemetry monitoring at least 24 hours given suspected history of seizure-like/shaking episodes per , however if no acute events overnight, would discontinue as otherwise no clear indication  Ativan 2mg q4h standing with 2mg q2h PRN, taper as tolerated  Hepatitis panel, lipid profile  Alcohol cessation counseling when patient more awake and alert  Would ask social work to evaluate and provide resources given history of alcohol abuse        Discussed plan of care with admitting resident. Rest of plan as per resident note above

## 2021-07-19 NOTE — ED ADULT NURSE NOTE - CHIEF COMPLAINT QUOTE
biba from home as per ems she was drinking continuosly x 5 days that's why the  called 911, Became combative after triage

## 2021-07-19 NOTE — H&P ADULT - HISTORY OF PRESENT ILLNESS
36 year old female was brought into the emergency department by emergency services. Her  was concerned as the pt had been  drinking 6-7 drinks per day for the last 5 days. As per patient her last drink was yesterday afternoon. She said her  was concerned because she was starting to have withdrawal, she was shaking and had a few episodes of vomiting pt could not recall as to how many but denied seeing any blood. Pt denied any chest pain, nausea vomiting, or diarrhea   As per chart review pt has a medical history of PTSD as well as Depression but currently on no medication.   In the ED pt was given 2 mg of Ativan, 50mg of Librium and 5 mg of Haldol. Pt was slightly tachycardiac with HR of 115, with a BP ranging from (131//89). Pt was resting comfortably in bed at the time of examination. CIWA on exam was 0

## 2021-07-19 NOTE — H&P ADULT - NSHPSOCIALHISTORY_GEN_ALL_CORE
Pt is  and lives with her . Pt has been drinking alcohol for the last 7 years but wouldn't specify how much she drank. She has been smoking cigarettes for 15 years about 1/2 ppd.

## 2021-07-19 NOTE — H&P ADULT - NSHPPHYSICALEXAM_GEN_ALL_CORE
PHYSICAL EXAM:  GENERAL: NAD, speaks in full sentences.  HEAD:  Atraumatic, Normocephalic  EYES: EOMI, PERRLA, conjunctiva and sclera clear  NECK: Supple, No JVD  CHEST/LUNG: Clear to auscultation bilaterally; No wheeze; No crackles; No accessory muscles used  HEART: Regular rate and rhythm; No murmurs;   ABDOMEN: Soft, Nontender, distended; Bowel sounds present; No guarding  EXTREMITIES:  2+ Peripheral Pulses, No cyanosis or edema  PSYCH: AAOx3  SKIN: No rashes or lesions

## 2021-07-19 NOTE — H&P ADULT - PROBLEM SELECTOR PLAN 1
Pt was brought in as she was having multiple bouts of vomiting and shaking.  -In the ED pt was given 2 mg of Ativan, 50mg of Librium and 5 mg of Haldol.   - Started Ativan Taper 2mg q4, 2mg 2q2 PRN.   - Haoldol PRN for agitation.   - F/U CIWA monitor for withdraw.   - Started on Folic Acid, Thiamine and Multivitamin.   - Continue with IV Fluids. Pt was brought in as she was having multiple bouts of vomiting and shaking.  -In the ED pt was given 2 mg of Ativan, 50mg of Librium and 5 mg of Haldol.   - Started Ativan Taper 2mg q4, 2mg 2q2 PRN.   - Haoldol PRN for agitation.   - F/U CIWA monitor for withdraw.   - Started on Folic Acid, Thiamine and Multivitamin.   - Continue with IV Fluids.  - Admit to tele for 24hours for questionable withdrawal seizure

## 2021-07-19 NOTE — ED PROVIDER NOTE - CLINICAL SUMMARY MEDICAL DECISION MAKING FREE TEXT BOX
35yo F with hx alcohol abuse, depression/PTSD presents with alcohol intoxication. On Ed arrival patient severely agitated, kicking/punching staff and attempting to stand up on stretcher, code gray called, given haldol 5mg IM and ativan 2mg IM but patient continued to kick and try to get out of stretcher, placed in 4point restraints for safety of patient and staff. Will remove restraints once patient is calm.  Will obtain labs and will await sobriety for reevaluation. 35yo F with hx alcohol abuse, depression/PTSD presents with alcohol intoxication. On Ed arrival patient severely agitated, kicking/punching staff and attempting to stand up on stretcher, code gray called, given haldol 5mg IM and ativan 2mg IM but patient continued to kick and try to get out of stretcher, placed in 4point restraints for safety of patient and staff. Will remove restraints once patient is calm.  Will obtain labs and will await sobriety for reevaluation.    labs show serum alcohol 342  @645am on reeval patient sleeping comfortably, restraints have been removed earlier when she became calm and fell asleep.  patient signedout to Dr. Luna at 730am.

## 2021-07-19 NOTE — ED ADULT NURSE NOTE - NS ED NURSE RECORD ANOTHER VITAL SIGN
-- [facility] [address] [place] [address], 
Patient informed will call back to schedule   
Yes

## 2021-07-19 NOTE — ED PROVIDER NOTE - OBJECTIVE STATEMENT
37yo F with hx alcohol abuse, depression/PTSD presents with alcohol intoxication. Per EMS patient's  called them reporting she had a withdrawal seizure despite binge drinking alcohol the last 5 days. per EMS no seizure or post-ictal period evident on scene. on ED arrival patient severely agitated, attempting to stand on stretcher, punching and kicking at staff.

## 2021-07-19 NOTE — ED ADULT NURSE NOTE - NSIMPLEMENTINTERV_GEN_ALL_ED
Implemented All Fall with Harm Risk Interventions:  [place] to call system. Call bell, personal items and telephone within reach. Instruct patient to call for assistance. Room bathroom lighting operational. Non-slip footwear when patient is off stretcher. Physically safe environment: no spills, clutter or unnecessary equipment. Stretcher in lowest position, wheels locked, appropriate side rails in place. Provide visual cue, wrist band, yellow gown, etc. Monitor gait and stability. Monitor for mental status changes and reorient to person, place, and time. Review medications for side effects contributing to fall risk. Reinforce activity limits and safety measures with patient and family. Provide visual clues: red socks.

## 2021-07-19 NOTE — H&P ADULT - PROBLEM SELECTOR PLAN 4
IMPROVE VTE Individual Risk Assessment    RISK                                                                Points    [  ] Previous VTE                                                  3  [  ] Thrombophilia                                               2  [  ] Lower limb paralysis                                      2        (unable to hold up >15 seconds)    [  ] Current Cancer                                              2         (within 6 months)  [  ] Immobilization > 24 hrs                                1  [  ] ICU/CCU stay > 24 hours                              1  [  ] Age > 60                                                      1    IMPROVE VTE Score ______0___  Started on Lovonox.

## 2021-07-19 NOTE — ED ADULT NURSE NOTE - OBJECTIVE STATEMENT
pt was brought to ED because she was drinking alcohol for the past 5 days, pt is uncooperative in ED , does not follow the orders, combative and aggressive toward medical staff, evaluated by Dr Alvarez, who ordered 4 extremities restraints,  sedated with haldol and ativan , blood work is done

## 2021-07-19 NOTE — ED ADULT TRIAGE NOTE - INTERNATIONAL TRAVEL
Augmentin or pcn please call
Dr. Portillo from [facility] called about this patient.  Dr. Portillo states that this patient has gum disease and will need to put topical minocycline right under her gums.  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  Please call Dr. Portillo at [phone].  Thank you  
Tiffany Wills can you please verify what to do for this pt  
No

## 2021-07-20 LAB
ALBUMIN SERPL ELPH-MCNC: 3.1 G/DL — LOW (ref 3.5–5)
ALP SERPL-CCNC: 55 U/L — SIGNIFICANT CHANGE UP (ref 40–120)
ALT FLD-CCNC: 54 U/L DA — SIGNIFICANT CHANGE UP (ref 10–60)
ANION GAP SERPL CALC-SCNC: 11 MMOL/L — SIGNIFICANT CHANGE UP (ref 5–17)
APTT BLD: 27.7 SEC — SIGNIFICANT CHANGE UP (ref 27.5–35.5)
AST SERPL-CCNC: 31 U/L — SIGNIFICANT CHANGE UP (ref 10–40)
BILIRUB SERPL-MCNC: 0.7 MG/DL — SIGNIFICANT CHANGE UP (ref 0.2–1.2)
BUN SERPL-MCNC: 11 MG/DL — SIGNIFICANT CHANGE UP (ref 7–18)
CALCIUM SERPL-MCNC: 8.8 MG/DL — SIGNIFICANT CHANGE UP (ref 8.4–10.5)
CHLORIDE SERPL-SCNC: 106 MMOL/L — SIGNIFICANT CHANGE UP (ref 96–108)
CHOLEST SERPL-MCNC: 217 MG/DL — HIGH
CO2 SERPL-SCNC: 24 MMOL/L — SIGNIFICANT CHANGE UP (ref 22–31)
COVID-19 SPIKE DOMAIN AB INTERP: NEGATIVE — SIGNIFICANT CHANGE UP
COVID-19 SPIKE DOMAIN ANTIBODY RESULT: 0.4 U/ML — SIGNIFICANT CHANGE UP
CREAT SERPL-MCNC: 0.5 MG/DL — SIGNIFICANT CHANGE UP (ref 0.5–1.3)
FOLATE SERPL-MCNC: 7.5 NG/ML — SIGNIFICANT CHANGE UP
GLUCOSE SERPL-MCNC: 100 MG/DL — HIGH (ref 70–99)
HAV IGM SER-ACNC: SIGNIFICANT CHANGE UP
HBV CORE IGM SER-ACNC: SIGNIFICANT CHANGE UP
HBV SURFACE AG SER-ACNC: SIGNIFICANT CHANGE UP
HCT VFR BLD CALC: 39.4 % — SIGNIFICANT CHANGE UP (ref 34.5–45)
HCV AB S/CO SERPL IA: 0.11 S/CO — SIGNIFICANT CHANGE UP (ref 0–0.99)
HCV AB SERPL-IMP: SIGNIFICANT CHANGE UP
HDLC SERPL-MCNC: 48 MG/DL — LOW
HGB BLD-MCNC: 13.2 G/DL — SIGNIFICANT CHANGE UP (ref 11.5–15.5)
INR BLD: 0.97 RATIO — SIGNIFICANT CHANGE UP (ref 0.88–1.16)
LIPID PNL WITH DIRECT LDL SERPL: 131 MG/DL — HIGH
MAGNESIUM SERPL-MCNC: 2 MG/DL — SIGNIFICANT CHANGE UP (ref 1.6–2.6)
MCHC RBC-ENTMCNC: 30.7 PG — SIGNIFICANT CHANGE UP (ref 27–34)
MCHC RBC-ENTMCNC: 33.5 GM/DL — SIGNIFICANT CHANGE UP (ref 32–36)
MCV RBC AUTO: 91.6 FL — SIGNIFICANT CHANGE UP (ref 80–100)
NON HDL CHOLESTEROL: 169 MG/DL — HIGH
NRBC # BLD: 0 /100 WBCS — SIGNIFICANT CHANGE UP (ref 0–0)
PHOSPHATE SERPL-MCNC: 3.3 MG/DL — SIGNIFICANT CHANGE UP (ref 2.5–4.5)
PLATELET # BLD AUTO: 252 K/UL — SIGNIFICANT CHANGE UP (ref 150–400)
POTASSIUM SERPL-MCNC: 3.7 MMOL/L — SIGNIFICANT CHANGE UP (ref 3.5–5.3)
POTASSIUM SERPL-SCNC: 3.7 MMOL/L — SIGNIFICANT CHANGE UP (ref 3.5–5.3)
PROT SERPL-MCNC: 6.4 G/DL — SIGNIFICANT CHANGE UP (ref 6–8.3)
PROTHROM AB SERPL-ACNC: 11.6 SEC — SIGNIFICANT CHANGE UP (ref 10.6–13.6)
RBC # BLD: 4.3 M/UL — SIGNIFICANT CHANGE UP (ref 3.8–5.2)
RBC # FLD: 13.1 % — SIGNIFICANT CHANGE UP (ref 10.3–14.5)
SARS-COV-2 IGG+IGM SERPL QL IA: 0.4 U/ML — SIGNIFICANT CHANGE UP
SARS-COV-2 IGG+IGM SERPL QL IA: NEGATIVE — SIGNIFICANT CHANGE UP
SODIUM SERPL-SCNC: 141 MMOL/L — SIGNIFICANT CHANGE UP (ref 135–145)
TRIGL SERPL-MCNC: 192 MG/DL — HIGH
WBC # BLD: 8.58 K/UL — SIGNIFICANT CHANGE UP (ref 3.8–10.5)
WBC # FLD AUTO: 8.58 K/UL — SIGNIFICANT CHANGE UP (ref 3.8–10.5)

## 2021-07-20 PROCEDURE — 99233 SBSQ HOSP IP/OBS HIGH 50: CPT | Mod: GC

## 2021-07-20 RX ORDER — THIAMINE MONONITRATE (VIT B1) 100 MG
500 TABLET ORAL THREE TIMES A DAY
Refills: 0 | Status: DISCONTINUED | OUTPATIENT
Start: 2021-07-20 | End: 2021-07-20

## 2021-07-20 RX ORDER — THIAMINE MONONITRATE (VIT B1) 100 MG
500 TABLET ORAL THREE TIMES A DAY
Refills: 0 | Status: DISCONTINUED | OUTPATIENT
Start: 2021-07-20 | End: 2021-07-21

## 2021-07-20 RX ADMIN — Medication 1.5 MILLIGRAM(S): at 17:38

## 2021-07-20 RX ADMIN — ENOXAPARIN SODIUM 40 MILLIGRAM(S): 100 INJECTION SUBCUTANEOUS at 11:48

## 2021-07-20 RX ADMIN — Medication 1 PATCH: at 21:07

## 2021-07-20 RX ADMIN — Medication 1 PATCH: at 11:48

## 2021-07-20 RX ADMIN — Medication 2 MILLIGRAM(S): at 10:31

## 2021-07-20 RX ADMIN — Medication 105 MILLIGRAM(S): at 14:28

## 2021-07-20 RX ADMIN — Medication 1 MILLIGRAM(S): at 11:47

## 2021-07-20 RX ADMIN — Medication 2 MILLIGRAM(S): at 06:13

## 2021-07-20 RX ADMIN — Medication 1 TABLET(S): at 11:48

## 2021-07-20 RX ADMIN — Medication 1.5 MILLIGRAM(S): at 21:34

## 2021-07-20 RX ADMIN — Medication 105 MILLIGRAM(S): at 21:35

## 2021-07-20 NOTE — PROGRESS NOTE ADULT - ATTENDING COMMENTS
Patient is alert, oriented.  She denies withdrawal symptoms, at present.   CIWA = 0.     Plan: Continue thiamine and CIWA protocol.  SW consultation.

## 2021-07-21 VITALS
SYSTOLIC BLOOD PRESSURE: 136 MMHG | RESPIRATION RATE: 20 BRPM | TEMPERATURE: 98 F | HEART RATE: 91 BPM | OXYGEN SATURATION: 100 % | DIASTOLIC BLOOD PRESSURE: 81 MMHG

## 2021-07-21 LAB
ANION GAP SERPL CALC-SCNC: 9 MMOL/L — SIGNIFICANT CHANGE UP (ref 5–17)
BUN SERPL-MCNC: 8 MG/DL — SIGNIFICANT CHANGE UP (ref 7–18)
CALCIUM SERPL-MCNC: 8.4 MG/DL — SIGNIFICANT CHANGE UP (ref 8.4–10.5)
CHLORIDE SERPL-SCNC: 106 MMOL/L — SIGNIFICANT CHANGE UP (ref 96–108)
CO2 SERPL-SCNC: 24 MMOL/L — SIGNIFICANT CHANGE UP (ref 22–31)
CREAT SERPL-MCNC: 0.38 MG/DL — LOW (ref 0.5–1.3)
GLUCOSE SERPL-MCNC: 101 MG/DL — HIGH (ref 70–99)
HCT VFR BLD CALC: 39.6 % — SIGNIFICANT CHANGE UP (ref 34.5–45)
HGB BLD-MCNC: 13.3 G/DL — SIGNIFICANT CHANGE UP (ref 11.5–15.5)
MAGNESIUM SERPL-MCNC: 2.1 MG/DL — SIGNIFICANT CHANGE UP (ref 1.6–2.6)
MCHC RBC-ENTMCNC: 30.6 PG — SIGNIFICANT CHANGE UP (ref 27–34)
MCHC RBC-ENTMCNC: 33.6 GM/DL — SIGNIFICANT CHANGE UP (ref 32–36)
MCV RBC AUTO: 91.2 FL — SIGNIFICANT CHANGE UP (ref 80–100)
NRBC # BLD: 0 /100 WBCS — SIGNIFICANT CHANGE UP (ref 0–0)
PHOSPHATE SERPL-MCNC: 3.4 MG/DL — SIGNIFICANT CHANGE UP (ref 2.5–4.5)
PLATELET # BLD AUTO: 246 K/UL — SIGNIFICANT CHANGE UP (ref 150–400)
POTASSIUM SERPL-MCNC: 4 MMOL/L — SIGNIFICANT CHANGE UP (ref 3.5–5.3)
POTASSIUM SERPL-SCNC: 4 MMOL/L — SIGNIFICANT CHANGE UP (ref 3.5–5.3)
RBC # BLD: 4.34 M/UL — SIGNIFICANT CHANGE UP (ref 3.8–5.2)
RBC # FLD: 12.7 % — SIGNIFICANT CHANGE UP (ref 10.3–14.5)
SODIUM SERPL-SCNC: 139 MMOL/L — SIGNIFICANT CHANGE UP (ref 135–145)
WBC # BLD: 7.52 K/UL — SIGNIFICANT CHANGE UP (ref 3.8–10.5)
WBC # FLD AUTO: 7.52 K/UL — SIGNIFICANT CHANGE UP (ref 3.8–10.5)

## 2021-07-21 PROCEDURE — 36415 COLL VENOUS BLD VENIPUNCTURE: CPT

## 2021-07-21 PROCEDURE — 86769 SARS-COV-2 COVID-19 ANTIBODY: CPT

## 2021-07-21 PROCEDURE — 83735 ASSAY OF MAGNESIUM: CPT

## 2021-07-21 PROCEDURE — 99239 HOSP IP/OBS DSCHRG MGMT >30: CPT | Mod: GC

## 2021-07-21 PROCEDURE — 81025 URINE PREGNANCY TEST: CPT

## 2021-07-21 PROCEDURE — 96372 THER/PROPH/DIAG INJ SC/IM: CPT

## 2021-07-21 PROCEDURE — 87635 SARS-COV-2 COVID-19 AMP PRB: CPT

## 2021-07-21 PROCEDURE — 84100 ASSAY OF PHOSPHORUS: CPT

## 2021-07-21 PROCEDURE — 80307 DRUG TEST PRSMV CHEM ANLYZR: CPT

## 2021-07-21 PROCEDURE — 80076 HEPATIC FUNCTION PANEL: CPT

## 2021-07-21 PROCEDURE — 85730 THROMBOPLASTIN TIME PARTIAL: CPT

## 2021-07-21 PROCEDURE — 99285 EMERGENCY DEPT VISIT HI MDM: CPT | Mod: 25

## 2021-07-21 PROCEDURE — 85610 PROTHROMBIN TIME: CPT

## 2021-07-21 PROCEDURE — 80048 BASIC METABOLIC PNL TOTAL CA: CPT

## 2021-07-21 PROCEDURE — 81001 URINALYSIS AUTO W/SCOPE: CPT

## 2021-07-21 PROCEDURE — 84702 CHORIONIC GONADOTROPIN TEST: CPT

## 2021-07-21 PROCEDURE — 82746 ASSAY OF FOLIC ACID SERUM: CPT

## 2021-07-21 PROCEDURE — 82962 GLUCOSE BLOOD TEST: CPT

## 2021-07-21 PROCEDURE — 80074 ACUTE HEPATITIS PANEL: CPT

## 2021-07-21 PROCEDURE — 93005 ELECTROCARDIOGRAM TRACING: CPT

## 2021-07-21 PROCEDURE — 76705 ECHO EXAM OF ABDOMEN: CPT

## 2021-07-21 PROCEDURE — 85027 COMPLETE CBC AUTOMATED: CPT

## 2021-07-21 PROCEDURE — 85025 COMPLETE CBC W/AUTO DIFF WBC: CPT

## 2021-07-21 PROCEDURE — 80053 COMPREHEN METABOLIC PANEL: CPT

## 2021-07-21 PROCEDURE — 80061 LIPID PANEL: CPT

## 2021-07-21 RX ORDER — FOLIC ACID 0.8 MG
1 TABLET ORAL
Qty: 30 | Refills: 0
Start: 2021-07-21 | End: 2021-08-19

## 2021-07-21 RX ORDER — CITALOPRAM 10 MG/1
0 TABLET, FILM COATED ORAL
Qty: 0 | Refills: 0 | DISCHARGE

## 2021-07-21 RX ORDER — THIAMINE MONONITRATE (VIT B1) 100 MG
1 TABLET ORAL
Qty: 30 | Refills: 0
Start: 2021-07-21 | End: 2021-08-19

## 2021-07-21 RX ORDER — THIAMINE MONONITRATE (VIT B1) 100 MG
1 TABLET ORAL
Qty: 0 | Refills: 0 | DISCHARGE

## 2021-07-21 RX ORDER — FOLIC ACID 0.8 MG
1 TABLET ORAL
Qty: 0 | Refills: 0 | DISCHARGE

## 2021-07-21 RX ADMIN — Medication 1 MILLIGRAM(S): at 11:38

## 2021-07-21 RX ADMIN — Medication 1 PATCH: at 11:39

## 2021-07-21 RX ADMIN — Medication 1 TABLET(S): at 11:38

## 2021-07-21 RX ADMIN — Medication 1 PATCH: at 05:20

## 2021-07-21 RX ADMIN — Medication 1 PATCH: at 11:40

## 2021-07-21 RX ADMIN — Medication 105 MILLIGRAM(S): at 05:19

## 2021-07-21 RX ADMIN — Medication 1.5 MILLIGRAM(S): at 05:20

## 2021-07-21 NOTE — PROGRESS NOTE ADULT - SUBJECTIVE AND OBJECTIVE BOX
PGY-1 Progress Note discussed with attending    CHIEF COMPLAINT & BRIEF HOSPITAL COURSE:  36 year old female was brought in by EMS with seizure as reported by her  admitted for alcohol withdrawal    INTERVAL HPI/OVERNIGHT EVENTS:   No acute events overnight noted. Patient resting comfortably, denies any complaints    REVIEW OF SYSTEMS:  CONSTITUTIONAL: No fever, weight loss, or fatigue  RESPIRATORY: No cough, wheezing, chills or hemoptysis; No shortness of breath  CARDIOVASCULAR: No chest pain, palpitations, dizziness, or leg swelling  GASTROINTESTINAL: No abdominal pain. No nausea, vomiting, or hematemesis; No diarrhea or constipation. No melena or hematochezia.  GENITOURINARY: No dysuria or hematuria, urinary frequency  NEUROLOGICAL: No headaches, memory loss, loss of strength, numbness, or tremors  SKIN: No itching, burning, rashes, or lesions     MEDICATIONS  (STANDING):  enoxaparin Injectable 40 milliGRAM(s) SubCutaneous daily  folic acid 1 milliGRAM(s) Oral daily  LORazepam   Injectable 1.5 milliGRAM(s) IV Push every 4 hours  LORazepam   Injectable   IV Push   multivitamin 1 Tablet(s) Oral daily  nicotine  14 mG/24 Hr(s) Transdermal Patch - Peds 1 Patch Transdermal daily  sodium chloride 0.9%. 1000 milliLiter(s) (70 mL/Hr) IV Continuous <Continuous>  thiamine IVPB 500 milliGRAM(s) IV Intermittent three times a day    MEDICATIONS  (PRN):  haloperidol    Injectable 1 milliGRAM(s) IntraMuscular every 12 hours PRN agitation  LORazepam   Injectable 2 milliGRAM(s) IV Push every 2 hours PRN Agitation      Vital Signs Last 24 Hrs  T(C): 37.1 (20 Jul 2021 11:22), Max: 37.4 (19 Jul 2021 15:05)  T(F): 98.7 (20 Jul 2021 11:22), Max: 99.3 (19 Jul 2021 15:05)  HR: 99 (20 Jul 2021 11:22) (89 - 130)  BP: 154/88 (20 Jul 2021 11:22) (124/77 - 154/88)  BP(mean): --  RR: 18 (20 Jul 2021 11:22) (18 - 19)  SpO2: 99% (20 Jul 2021 11:22) (98% - 100%)    PHYSICAL EXAMINATION:  GENERAL: NAD, well built  HEAD:  Atraumatic, Normocephalic  EYES:  conjunctiva and sclera clear  NECK: Supple, No JVD, Normal thyroid  CHEST/LUNG: Clear to auscultation. Clear to percussion bilaterally; No rales, rhonchi, wheezing, or rubs  HEART: Regular rate and rhythm; No murmurs, rubs, or gallops  ABDOMEN: Soft, Nontender, Nondistended; Bowel sounds present, no pain or masses on palpation  NERVOUS SYSTEM:  Alert & Oriented X3  : voiding well  EXTREMITIES:  2+ Peripheral Pulses, No clubbing, cyanosis, or edema  SKIN: warm dry                          13.2   8.58  )-----------( 252      ( 20 Jul 2021 06:46 )             39.4     07-20    141  |  106  |  11  ----------------------------<  100<H>  3.7   |  24  |  0.50    Ca    8.8      20 Jul 2021 06:46  Phos  3.3     07-20  Mg     2.0     07-20    TPro  6.4  /  Alb  3.1<L>  /  TBili  0.7  /  DBili  x   /  AST  31  /  ALT  54  /  AlkPhos  55  07-20    LIVER FUNCTIONS - ( 20 Jul 2021 06:46 )  Alb: 3.1 g/dL / Pro: 6.4 g/dL / ALK PHOS: 55 U/L / ALT: 54 U/L DA / AST: 31 U/L / GGT: x               PT/INR - ( 20 Jul 2021 06:46 )   PT: 11.6 sec;   INR: 0.97 ratio         PTT - ( 20 Jul 2021 06:46 )  PTT:27.7 sec    I&O's Summary          CAPILLARY BLOOD GLUCOSE      RADIOLOGY & ADDITIONAL TESTS:                  
    PGY-1 Progress Note discussed with attending    CHIEF COMPLAINT & BRIEF HOSPITAL COURSE:  36 year old female was brought in by EMS with seizure as reported by her  admitted for alcohol withdrawal    INTERVAL HPI/OVERNIGHT EVENTS:   No acute events, patient resting comfortably without complaints    REVIEW OF SYSTEMS:  CONSTITUTIONAL: No fever, weight loss, or fatigue  RESPIRATORY: No cough, wheezing, chills or hemoptysis; No shortness of breath  CARDIOVASCULAR: No chest pain, palpitations, dizziness, or leg swelling  GASTROINTESTINAL: No abdominal pain. No nausea, vomiting, or hematemesis; No diarrhea or constipation. No melena or hematochezia.  GENITOURINARY: No dysuria or hematuria, urinary frequency  NEUROLOGICAL: No headaches, memory loss, loss of strength, numbness, or tremors  SKIN: No itching, burning, rashes, or lesions     MEDICATIONS  (STANDING):  enoxaparin Injectable 40 milliGRAM(s) SubCutaneous daily  folic acid 1 milliGRAM(s) Oral daily  LORazepam   Injectable 1.5 milliGRAM(s) IV Push every 4 hours  LORazepam   Injectable   IV Push   multivitamin 1 Tablet(s) Oral daily  nicotine  14 mG/24 Hr(s) Transdermal Patch - Peds 1 Patch Transdermal daily  sodium chloride 0.9%. 1000 milliLiter(s) (70 mL/Hr) IV Continuous <Continuous>  thiamine IVPB 500 milliGRAM(s) IV Intermittent three times a day    MEDICATIONS  (PRN):  haloperidol    Injectable 1 milliGRAM(s) IntraMuscular every 12 hours PRN agitation  LORazepam   Injectable 2 milliGRAM(s) IV Push every 2 hours PRN Agitation      Vital Signs Last 24 Hrs  T(C): 37.1 (20 Jul 2021 11:22), Max: 37.4 (19 Jul 2021 15:05)  T(F): 98.7 (20 Jul 2021 11:22), Max: 99.3 (19 Jul 2021 15:05)  HR: 99 (20 Jul 2021 11:22) (89 - 130)  BP: 154/88 (20 Jul 2021 11:22) (124/77 - 154/88)  BP(mean): --  RR: 18 (20 Jul 2021 11:22) (18 - 19)  SpO2: 99% (20 Jul 2021 11:22) (98% - 100%)    PHYSICAL EXAMINATION:  GENERAL: NAD, well built  HEAD:  Atraumatic, Normocephalic  EYES:  conjunctiva and sclera clear  NECK: Supple, No JVD, Normal thyroid  CHEST/LUNG: Clear to auscultation. Clear to percussion bilaterally; No rales, rhonchi, wheezing, or rubs  HEART: Regular rate and rhythm; No murmurs, rubs, or gallops  ABDOMEN: Soft, Nontender, Nondistended; Bowel sounds present, no pain or masses on palpation  NERVOUS SYSTEM:  Alert & Oriented X3  : voiding well  EXTREMITIES:  2+ Peripheral Pulses, No clubbing, cyanosis, or edema  SKIN: warm dry                          13.2   8.58  )-----------( 252      ( 20 Jul 2021 06:46 )             39.4     07-20    141  |  106  |  11  ----------------------------<  100<H>  3.7   |  24  |  0.50    Ca    8.8      20 Jul 2021 06:46  Phos  3.3     07-20  Mg     2.0     07-20    TPro  6.4  /  Alb  3.1<L>  /  TBili  0.7  /  DBili  x   /  AST  31  /  ALT  54  /  AlkPhos  55  07-20    LIVER FUNCTIONS - ( 20 Jul 2021 06:46 )  Alb: 3.1 g/dL / Pro: 6.4 g/dL / ALK PHOS: 55 U/L / ALT: 54 U/L DA / AST: 31 U/L / GGT: x               PT/INR - ( 20 Jul 2021 06:46 )   PT: 11.6 sec;   INR: 0.97 ratio         PTT - ( 20 Jul 2021 06:46 )  PTT:27.7 sec    I&O's Summary          CAPILLARY BLOOD GLUCOSE      RADIOLOGY & ADDITIONAL TESTS:

## 2021-07-21 NOTE — DISCHARGE NOTE NURSING/CASE MANAGEMENT/SOCIAL WORK - NSDCPEWEB_GEN_ALL_CORE
[facility] for Tobacco Control website --- [email or web address] website --- [email or web address]

## 2021-07-21 NOTE — DISCHARGE NOTE PROVIDER - NSDCCPCAREPLAN_GEN_ALL_CORE_FT
PRINCIPAL DISCHARGE DIAGNOSIS  Diagnosis: Alcohol withdrawal  Assessment and Plan of Treatment: You were admitted for alcohol withdrawal. Excessive alcohol use can be dangerous or even deadly. You were managed with appropriate medications during your stay to relieve your symptoms and prevent further complications.   You should follow up with your primary care physician to discuss your alcohol use and other health problems. They will be able to help you cut back on your drinking and to ensure your safety and health.   You wished to LEAVE AGAINST MEDICAL ADVICE. Careful explanation was given to you and your  about the dangers of leaving. This dangers include and are not limited to respiratory decompensation, chest pain leading to cardiac arrest and even death. Even after discussing this you decided to leave AMA.  You are not allowed to drive until you are seen by your primary care doctor and has been cleared for driving by PCP.

## 2021-07-21 NOTE — DISCHARGE NOTE PROVIDER - HOSPITAL COURSE
The patient has requested to leave the ED against medical advice. Reason(s) for leaving include the following:  *** .  I believe this patient is of sound mind and competent to refuse medical care. The patient is answering and asking questions appropriately. Patient is not intoxicated and do not appear to be under the influence of any illicit drugs at this time. Patient is oriented to person, place and time. Patient is not psychotic, delusional, suicidal, homicidal or hallucinating. Patient demonstrates a normal mental capacity to make decisions regarding their healthcare. The patient has been advised of the risks of leaving AMA, which include but are not limited to death, coma, permanent disability, loss of current lifestyle, delay in diagnosis,  ***.     *** With this patient’s permission, I have involved their family in this issue and attempted to employ their assistance in getting the patient to not sign out against medical advice (*** This patient refused to allow me to engage their family in this decision *** This patient’s family is not available to be engaged in this decision) *** I have also contacted  ***PCP in this process. Despite all these attempts, the patient continues to wish to sign out against medical advice.     The patient has been advised that should *** change their mind; they are totally welcome to return, here, at any time. The patient understands that in no way does an AMA discharge mean that I do not want them to have the best medical care available. To this end, I have provided appropriate prescriptions, referrals, and discharge instructions.    This patient has signed ***refused to sign  the AMA form The patient has requested to leave the hospital against medical advice. Reason(s) for leaving include the following: no longer interested in further treatment for alcohol withdrawal.  I believe this patient is of sound mind and competent to refuse medical care. The patient is answering and asking questions appropriately. Patient is not intoxicated and do not appear to be under the influence of any illicit drugs at this time. Patient is oriented to person, place and time. Patient is not psychotic, delusional, suicidal, homicidal or hallucinating. Patient demonstrates a normal mental capacity to make decisions regarding their healthcare. The patient has been advised of the risks of leaving AMA, which include but are not limited to death, coma, permanent disability, loss of current lifestyle, delay in diagnosis, alcohol withdrawal seizures, tachycardia, hypertension, hallucinations.     I have also contacted Dr Farmer in this process. The patient continues to wish to sign out against medical advice.     The patient has been advised that should they change their mind; they are totally welcome to return, here, at any time. The patient understands that in no way does an AMA discharge mean that I do not want them to have the best medical care available. To this end, I have provided appropriate prescriptions, referrals, and discharge instructions.    This patient has signed the AMA form, placed in chart.

## 2021-07-21 NOTE — PROGRESS NOTE ADULT - ASSESSMENT
36 year old female was brought in by EMS as she was found to be in Etoh withdrawal. 
36 year old female was brought in by EMS as she was found to be in Etoh withdrawal.

## 2021-07-21 NOTE — PROGRESS NOTE ADULT - ATTENDING COMMENTS
Patient is alert and cooperative.   Vital Signs Last 24 Hrs  T(C): 36.9 (21 Jul 2021 11:08), Max: 37 (21 Jul 2021 07:34)  T(F): 98.4 (21 Jul 2021 11:08), Max: 98.6 (21 Jul 2021 07:34)  HR: 91 (21 Jul 2021 11:08) (81 - 92)  BP: 136/81 (21 Jul 2021 11:08) (134/87 - 140/98)  BP(mean): --  RR: 20 (21 Jul 2021 11:08) (18 - 20)  SpO2: 100% (21 Jul 2021 11:08) (98% - 100%)  CIWA = 0    last dose of ativan 5:00 AM.     IMP:  alcohol withdrawal without complications, so far.  Still at risk for complications, as it is < 24 hours since last elevated alcohol level.   Patient's  is present.  He agrees that she is okay to sign out AMA, as she wishes to return to work.   She has already enrolled in a local alcohol treatment support group and will resume her attendance.  Patient is competent to sign AMA.

## 2021-07-21 NOTE — DISCHARGE NOTE PROVIDER - NSDCMRMEDTOKEN_GEN_ALL_CORE_FT
chlordiazePOXIDE 10 mg oral capsule: 1 cap(s) orally 2 times a day x 1 day  1 cap(s) orally once a day for 2 days MDD:20 mg maximum dose  chlordiazePOXIDE 25 mg oral capsule: take only 10/22 MDD:25 mg maximum dose  CITALOPRAM HBR 20 MG TABLET: TAKE 1 TABLET BY MOUTH EVERY DAY  ergocalciferol 50,000 intl units (1.25 mg) oral capsule: 1 cap(s) orally once a week every thursday   folic acid 1 mg oral tablet: 1 tab(s) orally once a day  Multiple Vitamins oral tablet: 1 tab(s) orally once a day  pantoprazole 40 mg oral delayed release tablet: 1 tab(s) orally once a day (before a meal)  thiamine 100 mg oral tablet: 1 tab(s) orally once a day   folic acid 1 mg oral tablet: 1 tab(s) orally once a day  Multiple Vitamins oral tablet: 1 tab(s) orally once a day  thiamine 100 mg oral tablet: 1 tab(s) orally once a day

## 2021-07-21 NOTE — PROGRESS NOTE ADULT - PROBLEM SELECTOR PLAN 2
Pt was found to have Elevated AST and ALT.   hepatitis panel negative    Abd Ultrasound: hepatic steatosis  Utox: positive for benzos    -CMP daily
Pt was found to have Elevated AST and ALT.   hepatitis panel negative    Abd Ultrasound: hepatic steatosis  Utox: positive for benzos

## 2021-07-21 NOTE — DISCHARGE NOTE PROVIDER - CARE PROVIDER_API CALL
CONRADO CASTELLON  Internal Medicine  [address] [place] [address]  Phone: [phone]  Fax: [phone]  Follow Up Time: 1 week

## 2021-07-21 NOTE — PROGRESS NOTE ADULT - PROBLEM SELECTOR PLAN 4
IMPROVE VTE Individual Risk Assessment    RISK                                                                Points    [  ] Previous VTE                                                  3  [  ] Thrombophilia                                               2  [  ] Lower limb paralysis                                      2        (unable to hold up >15 seconds)    [  ] Current Cancer                                              2         (within 6 months)  [  ] Immobilization > 24 hrs                                1  [  ] ICU/CCU stay > 24 hours                              1  [  ] Age > 60                                                      1    IMPROVE VTE Score ______0___  Started on Lovonox.
IMPROVE VTE Individual Risk Assessment    RISK                                                                Points    [  ] Previous VTE                                                  3  [  ] Thrombophilia                                               2  [  ] Lower limb paralysis                                      2        (unable to hold up >15 seconds)    [  ] Current Cancer                                              2         (within 6 months)  [  ] Immobilization > 24 hrs                                1  [  ] ICU/CCU stay > 24 hours                              1  [  ] Age > 60                                                      1    IMPROVE VTE Score ______0___  Started on Lovonox.

## 2021-07-21 NOTE — PROGRESS NOTE ADULT - PROBLEM SELECTOR PLAN 3
As per chart review pt has a history of PTSD, currently on no medications.
As per chart review pt has a history of PTSD, currently on no medications.

## 2021-07-21 NOTE — DISCHARGE NOTE NURSING/CASE MANAGEMENT/SOCIAL WORK - NSDCPEEMAIL_GEN_ALL_CORE
[facility] for Tobacco Control email [email or web address]

## 2021-07-21 NOTE — PROGRESS NOTE ADULT - PROBLEM SELECTOR PLAN 1
Pt was brought in as she was having multiple bouts of vomiting and shaking.  -In the ED pt was given 2 mg of Ativan, 50mg of Librium and 5 mg of Haldol.   - Started Ativan Taper 2mg q4, now down to 1.5 mg q4. also 2mg 2q2 PRN.     CIWA 0 at this time, telemonitoring discontinued  - F/U CIWA monitor for withdraw.   - Started on Folic Acid, Thiamine and Multivitamin.     DC planning for tomorrow upon completion of ativan taper
Pt was brought in as she was having multiple bouts of vomiting and shaking.  -In the ED pt was given 2 mg of Ativan, 50mg of Librium and 5 mg of Haldol.   - Started Ativan Taper 2mg q4, now down to 1.5 mg q4. also 2mg 2q2 PRN.     CIWA 0 at this time, telemonitoring shows sinus tachycardia overnight now stable  - F/U CIWA monitor for withdraw.   - Started on Folic Acid, Thiamine and Multivitamin.   - Continue with IV Fluids.  - Admit to tele for 24hours for questionable withdrawal seizure, d/c telemetry

## 2021-07-21 NOTE — DISCHARGE NOTE NURSING/CASE MANAGEMENT/SOCIAL WORK - PATIENT PORTAL LINK FT
You can access the FollowMyHealth Patient Portal offered by [facility] by registering at the following website: [email or web address]. By joining [employer]’s FollowMyHealth portal, you will also be able to view your health information using other applications (apps) compatible with our system.

## 2021-08-03 ENCOUNTER — INPATIENT (INPATIENT)
Facility: HOSPITAL | Age: 36
LOS: 3 days | Discharge: HOME | DRG: 897 | End: 2021-08-07
Attending: STUDENT IN AN ORGANIZED HEALTH CARE EDUCATION/TRAINING PROGRAM | Admitting: STUDENT IN AN ORGANIZED HEALTH CARE EDUCATION/TRAINING PROGRAM
Payer: MEDICAID

## 2021-08-03 VITALS
RESPIRATION RATE: 20 BRPM | DIASTOLIC BLOOD PRESSURE: 125 MMHG | HEIGHT: 65 IN | OXYGEN SATURATION: 98 % | SYSTOLIC BLOOD PRESSURE: 175 MMHG | HEART RATE: 125 BPM

## 2021-08-03 RX ORDER — DIPHENHYDRAMINE HCL 50 MG
50 CAPSULE ORAL EVERY 6 HOURS
Refills: 0 | Status: DISCONTINUED | OUTPATIENT
Start: 2021-08-03 | End: 2021-08-07

## 2021-08-03 RX ORDER — HALOPERIDOL DECANOATE 100 MG/ML
5 INJECTION INTRAMUSCULAR ONCE
Refills: 0 | Status: COMPLETED | OUTPATIENT
Start: 2021-08-03 | End: 2021-08-03

## 2021-08-03 RX ADMIN — Medication 2 MILLIGRAM(S): at 21:33

## 2021-08-03 RX ADMIN — HALOPERIDOL DECANOATE 5 MILLIGRAM(S): 100 INJECTION INTRAMUSCULAR at 21:45

## 2021-08-03 NOTE — ED ADULT NURSE NOTE - ED STAT RN HANDOFF DETAILS 2
received pt.,in bed at 1910 pt.is awake and responsive.denies pain. transfer to rm 402 B report given to jamaal santos.not in distress

## 2021-08-03 NOTE — ED PROVIDER NOTE - NSFOLLOWUPINSTRUCTIONS_ED_ALL_ED_FT
Do not drink alcohol in excess.  Return to ER if you have pain, fever, vomiting, feel depressed, anxious or unsafe. See contact information for detox facility. If you need any assistance for appointments please contact our Care Coordinator at [phone].

## 2021-08-03 NOTE — ED PROVIDER NOTE - OBJECTIVE STATEMENT
36-year-old female hx of EtOH abuse/withdrawal here with EtOH intox. EMS activated by  - pt was drinking tonight, had ~10 bottles of tequila around her per EMS. Patient is belligerent and uncooperative - given Haldol/Ativan/Benadryl IM. Unable to obtain history/ROS.

## 2021-08-03 NOTE — ED ADULT TRIAGE NOTE - CHIEF COMPLAINT QUOTE
BIBEMS for alcohol intoxication after  call 911.  As per EMS, patient consumed about 6 bottles of Tequila; was having unsteady gait.  The patient noted to be hostile towards EMS, triage nurse and staff.  MD talked to pt, pat remains hostile; attempting to hit staff, EMS; using profanity.  Fine tremors noted.

## 2021-08-03 NOTE — ED PROVIDER NOTE - NSFOLLOWUPCLINICS_GEN_ALL_ED_FT
[facility]  [address] [place] [address]  Phone: [phone]  Fax: [phone]

## 2021-08-03 NOTE — ED PROVIDER NOTE - PROGRESS NOTE DETAILS
Pt is clinically sober, eating food, no vomiting. Denies suicidal ideation, refused detox, denies being un-domiciled, refused  consult.   Pt is well appearing, has no new complaints and able to walk with normal gait. Pt is stable for discharge and follow up with medical doctor. Pt educated on care and need for follow up. Discussed anticipatory guidance and return precautions. Questions answered. I had a detailed discussion with the patient and/or guardian regarding the historical points, exam findings, and any diagnostic results supporting the discharge diagnosis.    called to accompany pt home. Angelica: otto 1, admit

## 2021-08-03 NOTE — ED PROVIDER NOTE - PATIENT PORTAL LINK FT
You can access the FollowMyHealth Patient Portal offered by [facility] by registering at the following website: [email or web address]. By joining [employer]’s FollowMyHealth portal, you will also be able to view your health information using other applications (apps) compatible with our system.

## 2021-08-03 NOTE — ED PROVIDER NOTE - CLINICAL SUMMARY MEDICAL DECISION MAKING FREE TEXT BOX
36-year-old female hx of EtOH abuse presenting with EtOH intoxication. Given Haldol/Ativan/Benadryl for agitation. Will check Psych clearance labs and EKG. Anticipate DC once sober.

## 2021-08-04 DIAGNOSIS — F10.139 ALCOHOL ABUSE WITH WITHDRAWAL, UNSPECIFIED: ICD-10-CM

## 2021-08-04 DIAGNOSIS — F43.10 POST-TRAUMATIC STRESS DISORDER, UNSPECIFIED: ICD-10-CM

## 2021-08-04 DIAGNOSIS — I10 ESSENTIAL (PRIMARY) HYPERTENSION: ICD-10-CM

## 2021-08-04 DIAGNOSIS — R00.0 TACHYCARDIA, UNSPECIFIED: ICD-10-CM

## 2021-08-04 DIAGNOSIS — F10.10 ALCOHOL ABUSE, UNCOMPLICATED: ICD-10-CM

## 2021-08-04 DIAGNOSIS — F32.9 MAJOR DEPRESSIVE DISORDER, SINGLE EPISODE, UNSPECIFIED: ICD-10-CM

## 2021-08-04 DIAGNOSIS — Z71.89 OTHER SPECIFIED COUNSELING: ICD-10-CM

## 2021-08-04 DIAGNOSIS — Z29.9 ENCOUNTER FOR PROPHYLACTIC MEASURES, UNSPECIFIED: ICD-10-CM

## 2021-08-04 DIAGNOSIS — R73.9 HYPERGLYCEMIA, UNSPECIFIED: ICD-10-CM

## 2021-08-04 LAB
ALBUMIN SERPL ELPH-MCNC: 3.3 G/DL — LOW (ref 3.5–5)
ALP SERPL-CCNC: 49 U/L — SIGNIFICANT CHANGE UP (ref 40–120)
ALT FLD-CCNC: 83 U/L DA — HIGH (ref 10–60)
ANION GAP SERPL CALC-SCNC: 7 MMOL/L — SIGNIFICANT CHANGE UP (ref 5–17)
APAP SERPL-MCNC: <2 UG/ML — LOW (ref 10–30)
AST SERPL-CCNC: 37 U/L — SIGNIFICANT CHANGE UP (ref 10–40)
BASOPHILS # BLD AUTO: 0.03 K/UL — SIGNIFICANT CHANGE UP (ref 0–0.2)
BASOPHILS NFR BLD AUTO: 0.3 % — SIGNIFICANT CHANGE UP (ref 0–2)
BILIRUB SERPL-MCNC: 0.2 MG/DL — SIGNIFICANT CHANGE UP (ref 0.2–1.2)
BUN SERPL-MCNC: 11 MG/DL — SIGNIFICANT CHANGE UP (ref 7–18)
CALCIUM SERPL-MCNC: 8.1 MG/DL — LOW (ref 8.4–10.5)
CHLORIDE SERPL-SCNC: 108 MMOL/L — SIGNIFICANT CHANGE UP (ref 96–108)
CO2 SERPL-SCNC: 24 MMOL/L — SIGNIFICANT CHANGE UP (ref 22–31)
CREAT SERPL-MCNC: 0.38 MG/DL — LOW (ref 0.5–1.3)
EOSINOPHIL # BLD AUTO: 0.02 K/UL — SIGNIFICANT CHANGE UP (ref 0–0.5)
EOSINOPHIL NFR BLD AUTO: 0.2 % — SIGNIFICANT CHANGE UP (ref 0–6)
ETHANOL SERPL-MCNC: 289 MG/DL — HIGH (ref 0–10)
ETHANOL SERPL-MCNC: <3 MG/DL — SIGNIFICANT CHANGE UP (ref 0–10)
GLUCOSE SERPL-MCNC: 115 MG/DL — HIGH (ref 70–99)
HCT VFR BLD CALC: 37.7 % — SIGNIFICANT CHANGE UP (ref 34.5–45)
HGB BLD-MCNC: 12.7 G/DL — SIGNIFICANT CHANGE UP (ref 11.5–15.5)
IMM GRANULOCYTES NFR BLD AUTO: 0.5 % — SIGNIFICANT CHANGE UP (ref 0–1.5)
LYMPHOCYTES # BLD AUTO: 1.79 K/UL — SIGNIFICANT CHANGE UP (ref 1–3.3)
LYMPHOCYTES # BLD AUTO: 20.7 % — SIGNIFICANT CHANGE UP (ref 13–44)
MCHC RBC-ENTMCNC: 30.5 PG — SIGNIFICANT CHANGE UP (ref 27–34)
MCHC RBC-ENTMCNC: 33.7 GM/DL — SIGNIFICANT CHANGE UP (ref 32–36)
MCV RBC AUTO: 90.6 FL — SIGNIFICANT CHANGE UP (ref 80–100)
MONOCYTES # BLD AUTO: 1.07 K/UL — HIGH (ref 0–0.9)
MONOCYTES NFR BLD AUTO: 12.4 % — SIGNIFICANT CHANGE UP (ref 2–14)
NEUTROPHILS # BLD AUTO: 5.68 K/UL — SIGNIFICANT CHANGE UP (ref 1.8–7.4)
NEUTROPHILS NFR BLD AUTO: 65.9 % — SIGNIFICANT CHANGE UP (ref 43–77)
NRBC # BLD: 0 /100 WBCS — SIGNIFICANT CHANGE UP (ref 0–0)
PLATELET # BLD AUTO: 192 K/UL — SIGNIFICANT CHANGE UP (ref 150–400)
POTASSIUM SERPL-MCNC: 3.5 MMOL/L — SIGNIFICANT CHANGE UP (ref 3.5–5.3)
POTASSIUM SERPL-SCNC: 3.5 MMOL/L — SIGNIFICANT CHANGE UP (ref 3.5–5.3)
PROT SERPL-MCNC: 7 G/DL — SIGNIFICANT CHANGE UP (ref 6–8.3)
RBC # BLD: 4.16 M/UL — SIGNIFICANT CHANGE UP (ref 3.8–5.2)
RBC # FLD: 13.7 % — SIGNIFICANT CHANGE UP (ref 10.3–14.5)
SALICYLATES SERPL-MCNC: 1.8 MG/DL — LOW (ref 2.8–20)
SARS-COV-2 RNA SPEC QL NAA+PROBE: SIGNIFICANT CHANGE UP
SODIUM SERPL-SCNC: 139 MMOL/L — SIGNIFICANT CHANGE UP (ref 135–145)
WBC # BLD: 8.63 K/UL — SIGNIFICANT CHANGE UP (ref 3.8–10.5)
WBC # FLD AUTO: 8.63 K/UL — SIGNIFICANT CHANGE UP (ref 3.8–10.5)

## 2021-08-04 PROCEDURE — 99223 1ST HOSP IP/OBS HIGH 75: CPT | Mod: GC

## 2021-08-04 RX ORDER — SODIUM CHLORIDE 9 MG/ML
1000 INJECTION INTRAMUSCULAR; INTRAVENOUS; SUBCUTANEOUS ONCE
Refills: 0 | Status: COMPLETED | OUTPATIENT
Start: 2021-08-04 | End: 2021-08-04

## 2021-08-04 RX ORDER — THIAMINE MONONITRATE (VIT B1) 100 MG
100 TABLET ORAL DAILY
Refills: 0 | Status: DISCONTINUED | OUTPATIENT
Start: 2021-08-04 | End: 2021-08-07

## 2021-08-04 RX ORDER — FOLIC ACID 0.8 MG
1 TABLET ORAL DAILY
Refills: 0 | Status: DISCONTINUED | OUTPATIENT
Start: 2021-08-04 | End: 2021-08-07

## 2021-08-04 RX ADMIN — Medication 2 MILLIGRAM(S): at 22:11

## 2021-08-04 RX ADMIN — SODIUM CHLORIDE 1000 MILLILITER(S): 9 INJECTION INTRAMUSCULAR; INTRAVENOUS; SUBCUTANEOUS at 15:02

## 2021-08-04 RX ADMIN — Medication 2 MILLIGRAM(S): at 15:02

## 2021-08-04 NOTE — H&P ADULT - HISTORY OF PRESENT ILLNESS
Patient is a 36 year old female patient, w/ PMH of alcohol abuse/withdrawal, depression, PTSD, and HTN, who came to the ED because patient's  had found the patient with more than 10 bottles of Tequila next to her last night. Patient drinks Tequila daily. Patient reports that she is depressed thinking about what she would do alone if her  were to pass away, given the age difference(35yrs) between her and her . Patient endorses difficulty sleeping and feeling depressed, which is 5 score (mild depression) on PHQ-9 scale. Denies suicidal ideation or thought of hurting others.  Upon arrival to ED last night, patient was agitated and was given Haldol&Ativan&Benadryl. At 3PM today, patient started to shake and vomited, so she was given 1L NS bolus and 2mg Ativan.    Currently, patient has chills and feels dizzy when changing position. CIWA = 0    On July 19th, the patient was admitted for alcohol withdrawal, and on July 21st, patient had signed out AMA while she was undergoing Ativan taper. Patient does not follow up with her therapist because she thinks it is useless. Reprts noncompliance on all of her medications. Patient is a 36 year old female patient, w/ PMH of alcohol abuse/withdrawal, depression, PTSD, and HTN, who came to the ED because patient's  had found the patient with more than 10 bottles of Tequila next to her last night. Patient drinks Tequila daily. Patient reports that she is depressed thinking about what she would do alone if her  were to pass away, given the age difference(35yrs) between her and her . Patient endorses difficulty sleeping and feeling depressed, which is 5 score (mild depression) on PHQ-9 scale. Denies suicidal ideation or thought of hurting others.  Upon arrival to ED last night, patient was agitated and was given Haldol & Ativan & Benadryl. At 3PM today, patient's started to shake and vomited, so she was given 1L NS bolus and 2mg Ativan (CIWA =18)    Currently, patient has chills and feels dizzy when changing position. CIWA = 0    On July 19th, the patient was admitted for alcohol withdrawal, and on July 21st, patient had signed out AMA while she was undergoing Ativan taper. Patient does not follow up with her therapist because she thinks it is useless. Reprts noncompliance on all of her medications.

## 2021-08-04 NOTE — H&P ADULT - PROBLEM SELECTOR PLAN 1
- Alcohol abuse due to depression  - Alcohol 289 on admission  - Hepatic steatosis on US 7/19/21  - in ED, s/p Ativan 2mg, Benadryl 50mg, Haldol 5mg.  - s/p 1L NS bolus and Ativan 2mg push due to vomit+shaking  - CIWA 0 currently  - on Thiamine, folic acid, and multivitamins   - Started on Ativan 2mg IV q4hrs, and 2mg PRN if CIWA>8 - Alcohol abuse due to depression  - Alcohol 289 on admission  - Hepatic steatosis on US 7/19/21  - In ED, s/p Ativan 2mg, Benadryl 50mg, Haldol 5mg.  - s/p 1L NS bolus and Ativan 2mg push due to vomit+shaking  - CIWA 0 currently  - On Thiamine, folic acid, and multivitamins   - Started on Ativan 2mg IV q4hrs, and 2mg PRN if CIWA>8

## 2021-08-04 NOTE — H&P ADULT - PROBLEM SELECTOR PLAN 5
- mild hyperglycemia 115  - f/u HbA1c - Mild hyperglycemia 115  - F/u HbA1c - Diagnosed 1 month ago  - No home medication

## 2021-08-04 NOTE — H&P ADULT - NSHPPHYSICALEXAM_GEN_ALL_CORE
GENERAL: NAD, wrapped in blanket  HEAD:  Atraumatic, Normocephalic  EYES: EOMI, PERRLA  NERVOUS SYSTEM:  Alert & Oriented X3, Motor Strength 5/5 B/L upper and lower extremities, sensation intact. No tremors  CHEST/LUNG: Lungs clear to auscultation bilaterally, No rales, rhonchi, wheezing   HEART: Sinus tachycardia; No murmurs, rubs, or gallops  ABDOMEN: Soft, Nontender, Nondistended; Bowel sounds present  EXTREMITIES:  2+ Peripheral Pulses, No clubbing, cyanosis, or edema  SKIN: No rashes or lesions

## 2021-08-04 NOTE — H&P ADULT - ASSESSMENT
Patient is a 36 year old female patient, w/ PMH of alcohol abuse/withdrawal, depression, PTSD, and HTN, who came to the ED with alcohol abuse. Admitted for alcohol abuse.

## 2021-08-04 NOTE — H&P ADULT - PROBLEM SELECTOR PLAN 7
- wants outpatient rehab for alcohol problem Plan: RISK                                                          Points  [] Previous VTE                                           3  [] Thrombophilia                                        2  [] Lower limb paralysis                              2   [] Current Cancer                                       2   [x] Immobilization > 24 hrs                        1  [] ICU/CCU stay > 24 hours                       1  [] Age > 60                                                   1  Lovenox 40 not indicated  Plan: RISK                                                          Points  [] Previous VTE                                           3  [] Thrombophilia                                        2  [] Lower limb paralysis                              2   [] Current Cancer                                       2   [] Immobilization > 24 hrs                        1  [] ICU/CCU stay > 24 hours                       1  [] Age > 60                                                   1

## 2021-08-04 NOTE — H&P ADULT - PROBLEM SELECTOR PLAN 6
Plan: RISK                                                          Points  [] Previous VTE                                           3  [] Thrombophilia                                        2  [] Lower limb paralysis                              2   [] Current Cancer                                       2   [x] Immobilization > 24 hrs                        1  [] ICU/CCU stay > 24 hours                       1  [] Age > 60                                                   1 Plan: RISK                                                          Points  [] Previous VTE                                           3  [] Thrombophilia                                        2  [] Lower limb paralysis                              2   [] Current Cancer                                       2   [x] Immobilization > 24 hrs                        1  [] ICU/CCU stay > 24 hours                       1  [] Age > 60                                                   1  Lovenox 40 - Mild hyperglycemia 115  - F/u HbA1c

## 2021-08-04 NOTE — H&P ADULT - PROBLEM SELECTOR PLAN 2
- Patient endorses difficulty sleeping and feeling depressed, which is 5 score (mild depression) on PHQ-9 scale - not on any home medication  - Patient endorses difficulty sleeping and feeling depressed, which is 5 score (mild depression) on PHQ-9 scale  - Does not follow up with therapist because she thinks therapy is useless - No home medication  - Patient endorses difficulty sleeping and feeling depressed, which is 5 score (mild depression) on PHQ-9 scale  - Does not follow up with therapist because she thinks therapy is useless - EKG on strip showed "sinus tachycardia, possible left atrial enlargement, cannot rule out antetior infarct"  - HR 96 now  - f/u troponin, cardiac enzymes - EKG on strip showed "sinus tachycardia, possible left atrial enlargement, cannot rule out antetior infarct"  - HR 96 now  - Just has dizziness when changing positions. No other symptoms.  - f/u troponin, cardiac enzymes - EKG on strip showed "sinus tachycardia, possible left atrial enlargement, cannot rule out antetior infarct"  - HR 96 now

## 2021-08-04 NOTE — H&P ADULT - NSHPSOCIALHISTORY_GEN_ALL_CORE
Patient lives with her  at home. Not sexually active. No Hx of STDs. Patient smokes 1 pack per day. Patient drinks excessively. Denies drugs. She feels safe at home.  Noncompliant on medications Patient lives with her  at home. Not sexually active. No Hx of STDs. Patient smokes 1 pack per day for 12 year. Patient drinks excessively. Denies drugs. She feels safe at home.  Noncompliant on medications
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
## 2021-08-04 NOTE — H&P ADULT - PROBLEM SELECTOR PLAN 8
- wants outpatient rehab for alcohol problem - Does not follow up with therapist because she thinks therapy is useless  - wants outpatient rehab for alcohol problem

## 2021-08-04 NOTE — H&P ADULT - NSHPREVIEWOFSYSTEMS_GEN_ALL_CORE
CONSTITUTIONAL: No fever, weight loss, or fatigue  EYES: No eye pain, visual disturbances, or discharge  ENT:  No difficulty hearing, tinnitus, vertigo; No sinus or throat pain  NECK: No pain or stiffness  RESPIRATORY: No cough, wheezing, chills or hemoptysis; No Shortness of Breath  CARDIOVASCULAR: No chest pain, palpitations, passing out, dizziness, or leg swelling  GASTROINTESTINAL: No abdominal or epigastric pain. No nausea, vomiting, or hematemesis; No diarrhea or constipation. No melena or hematochezia.  GENITOURINARY: No dysuria, frequency, hematuria, or incontinence  NEUROLOGICAL: No headaches, memory loss, loss of strength, numbness, or tremors  SKIN: No itching, burning, rashes, or lesions   ENDOCRINE: No heat or cold intolerance; No hair loss  MUSCULOSKELETAL: No joint pain or swelling; No muscle, back, No extremity pain
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
## 2021-08-04 NOTE — H&P ADULT - NSICDXPASTMEDICALHX_GEN_ALL_CORE_FT
PAST MEDICAL HISTORY:  Alcohol abuse     Alcohol withdrawal seizure     Depression     HTN (hypertension)     Post traumatic stress disorder (PTSD)      PAST MEDICAL HISTORY:  Alcohol abuse     Alcohol withdrawal seizure     Depression     Hepatic steatosis     HTN (hypertension)     Post traumatic stress disorder (PTSD)

## 2021-08-05 DIAGNOSIS — Z02.9 ENCOUNTER FOR ADMINISTRATIVE EXAMINATIONS, UNSPECIFIED: ICD-10-CM

## 2021-08-05 LAB
A1C WITH ESTIMATED AVERAGE GLUCOSE RESULT: 5.4 % — SIGNIFICANT CHANGE UP (ref 4–5.6)
ALBUMIN SERPL ELPH-MCNC: 2.9 G/DL — LOW (ref 3.5–5)
ALP SERPL-CCNC: 43 U/L — SIGNIFICANT CHANGE UP (ref 40–120)
ALT FLD-CCNC: 66 U/L DA — HIGH (ref 10–60)
ANION GAP SERPL CALC-SCNC: 9 MMOL/L — SIGNIFICANT CHANGE UP (ref 5–17)
AST SERPL-CCNC: 45 U/L — HIGH (ref 10–40)
BILIRUB SERPL-MCNC: 0.9 MG/DL — SIGNIFICANT CHANGE UP (ref 0.2–1.2)
BUN SERPL-MCNC: 7 MG/DL — SIGNIFICANT CHANGE UP (ref 7–18)
CALCIUM SERPL-MCNC: 8.7 MG/DL — SIGNIFICANT CHANGE UP (ref 8.4–10.5)
CHLORIDE SERPL-SCNC: 99 MMOL/L — SIGNIFICANT CHANGE UP (ref 96–108)
CO2 SERPL-SCNC: 28 MMOL/L — SIGNIFICANT CHANGE UP (ref 22–31)
COVID-19 SPIKE DOMAIN AB INTERP: NEGATIVE — SIGNIFICANT CHANGE UP
COVID-19 SPIKE DOMAIN ANTIBODY RESULT: 0.4 U/ML — SIGNIFICANT CHANGE UP
CREAT SERPL-MCNC: 0.49 MG/DL — LOW (ref 0.5–1.3)
ESTIMATED AVERAGE GLUCOSE: 108 MG/DL — SIGNIFICANT CHANGE UP (ref 68–114)
GLUCOSE SERPL-MCNC: 118 MG/DL — HIGH (ref 70–99)
HCT VFR BLD CALC: 35.9 % — SIGNIFICANT CHANGE UP (ref 34.5–45)
HGB BLD-MCNC: 12.4 G/DL — SIGNIFICANT CHANGE UP (ref 11.5–15.5)
MAGNESIUM SERPL-MCNC: 1.8 MG/DL — SIGNIFICANT CHANGE UP (ref 1.6–2.6)
MCHC RBC-ENTMCNC: 31.6 PG — SIGNIFICANT CHANGE UP (ref 27–34)
MCHC RBC-ENTMCNC: 34.5 GM/DL — SIGNIFICANT CHANGE UP (ref 32–36)
MCV RBC AUTO: 91.3 FL — SIGNIFICANT CHANGE UP (ref 80–100)
NRBC # BLD: 0 /100 WBCS — SIGNIFICANT CHANGE UP (ref 0–0)
PHOSPHATE SERPL-MCNC: 3 MG/DL — SIGNIFICANT CHANGE UP (ref 2.5–4.5)
PLATELET # BLD AUTO: 167 K/UL — SIGNIFICANT CHANGE UP (ref 150–400)
POTASSIUM SERPL-MCNC: 3.2 MMOL/L — LOW (ref 3.5–5.3)
POTASSIUM SERPL-SCNC: 3.2 MMOL/L — LOW (ref 3.5–5.3)
PROT SERPL-MCNC: 6.5 G/DL — SIGNIFICANT CHANGE UP (ref 6–8.3)
RBC # BLD: 3.93 M/UL — SIGNIFICANT CHANGE UP (ref 3.8–5.2)
RBC # FLD: 13.6 % — SIGNIFICANT CHANGE UP (ref 10.3–14.5)
SARS-COV-2 IGG+IGM SERPL QL IA: 0.4 U/ML — SIGNIFICANT CHANGE UP
SARS-COV-2 IGG+IGM SERPL QL IA: NEGATIVE — SIGNIFICANT CHANGE UP
SODIUM SERPL-SCNC: 136 MMOL/L — SIGNIFICANT CHANGE UP (ref 135–145)
WBC # BLD: 5.69 K/UL — SIGNIFICANT CHANGE UP (ref 3.8–10.5)
WBC # FLD AUTO: 5.69 K/UL — SIGNIFICANT CHANGE UP (ref 3.8–10.5)

## 2021-08-05 PROCEDURE — 99232 SBSQ HOSP IP/OBS MODERATE 35: CPT

## 2021-08-05 RX ORDER — CITALOPRAM 10 MG/1
20 TABLET, FILM COATED ORAL DAILY
Refills: 0 | Status: DISCONTINUED | OUTPATIENT
Start: 2021-08-05 | End: 2021-08-07

## 2021-08-05 RX ORDER — POTASSIUM CHLORIDE 20 MEQ
40 PACKET (EA) ORAL ONCE
Refills: 0 | Status: COMPLETED | OUTPATIENT
Start: 2021-08-05 | End: 2021-08-05

## 2021-08-05 RX ADMIN — Medication 2 MILLIGRAM(S): at 18:49

## 2021-08-05 RX ADMIN — Medication 2 MILLIGRAM(S): at 22:00

## 2021-08-05 RX ADMIN — Medication 1 TABLET(S): at 12:16

## 2021-08-05 RX ADMIN — CITALOPRAM 20 MILLIGRAM(S): 10 TABLET, FILM COATED ORAL at 12:16

## 2021-08-05 RX ADMIN — Medication 40 MILLIEQUIVALENT(S): at 12:13

## 2021-08-05 RX ADMIN — Medication 2 MILLIGRAM(S): at 14:57

## 2021-08-05 RX ADMIN — Medication 100 MILLIGRAM(S): at 12:16

## 2021-08-05 RX ADMIN — Medication 1 MILLIGRAM(S): at 12:16

## 2021-08-05 RX ADMIN — Medication 2 MILLIGRAM(S): at 12:15

## 2021-08-05 RX ADMIN — Medication 2 MILLIGRAM(S): at 06:43

## 2021-08-05 NOTE — PROGRESS NOTE ADULT - PROBLEM SELECTOR PLAN 1
- ETOH 289 on admission  - In ED, s/p Ativan 2mg, Benadryl 50mg, Haldol 5mg.  - CIWA 0 currently  - On Thiamine, folic acid, and multivitamins   - Started on Ativan 2mg IV q4hrs, and 2mg PRN if CIWA>8

## 2021-08-05 NOTE — PROGRESS NOTE ADULT - ASSESSMENT
Patient is a 36 year old female patient, w/ PMH of alcohol abuse/withdrawal, depression, PTSD, and HTN, who came to the ED after being found with 10 bottle of empty tequila around her. Admitted for ETOH abuse, started on CIWA and ativan taper

## 2021-08-05 NOTE — SBIRT NOTE ADULT - NSSBIRTALCNOACTINTDET_GEN_A_CORE
Patient declined any referrals/resources to treatment.  Reports she has the resources provided from prior hospital visits.

## 2021-08-05 NOTE — PROGRESS NOTE ADULT - PROBLEM SELECTOR PLAN 4
not indicated  Plan: RISK                                                          Points  [] Previous VTE                                           3  [] Thrombophilia                                        2  [] Lower limb paralysis                              2   [] Current Cancer                                       2   [] Immobilization > 24 hrs                        1  [] ICU/CCU stay > 24 hours                       1  [] Age > 60                                                   1

## 2021-08-05 NOTE — PROGRESS NOTE ADULT - SUBJECTIVE AND OBJECTIVE BOX
NP Note discussed with  Primary Attending    INTERVAL HPI/OVERNIGHT EVENTS: CIWA -0     MEDICATIONS  (STANDING):  citalopram 20 milliGRAM(s) Oral daily  folic acid 1 milliGRAM(s) Oral daily  LORazepam   Injectable 2 milliGRAM(s) IV Push every 4 hours  multivitamin 1 Tablet(s) Oral daily  potassium chloride    Tablet ER 40 milliEquivalent(s) Oral once  thiamine 100 milliGRAM(s) Oral daily    MEDICATIONS  (PRN):  diphenhydrAMINE   Injectable 50 milliGRAM(s) IntraMuscular every 6 hours PRN Rash and/or Itching  LORazepam   Injectable 2 milliGRAM(s) IV Push every 2 hours PRN Ciwa > 8      __________________________________________________  REVIEW OF SYSTEMS:    CONSTITUTIONAL: No fever,   EYES: no acute visual disturbances  NECK: No pain or stiffness  RESPIRATORY: No cough; No shortness of breath  CARDIOVASCULAR: No chest pain, no palpitations  GASTROINTESTINAL: No pain. No nausea or vomiting; No diarrhea   NEUROLOGICAL: No headache or numbness, no tremors  MUSCULOSKELETAL: No joint pain, no muscle pain  GENITOURINARY: no dysuria, no frequency, no hesitancy  PSYCHIATRY: no depression , no anxiety  ALL OTHER  ROS negative        Vital Signs Last 24 Hrs  T(C): 36.7 (05 Aug 2021 05:00), Max: 37.2 (04 Aug 2021 15:39)  T(F): 98 (05 Aug 2021 05:00), Max: 99 (04 Aug 2021 15:39)  HR: 83 (05 Aug 2021 05:00) (83 - 96)  BP: 126/72 (05 Aug 2021 05:00) (126/72 - 145/88)  BP(mean): --  RR: 17 (05 Aug 2021 05:00) (17 - 18)  SpO2: 99% (05 Aug 2021 05:00) (97% - 99%)    ________________________________________________  PHYSICAL EXAM:  GENERAL: NAD  HEENT: Normocephalic;  conjunctivae and sclerae clear;   NECK : supple  CHEST/LUNG: Clear to auscultation bilaterally with good air entry   HEART: S1 S2  regular; no murmurs, gallops or rubs  ABDOMEN: Soft, Nontender, Nondistended; Bowel sounds present  EXTREMITIES: no cyanosis; no edema; no calf tenderness  SKIN: warm and dry; no rash  NERVOUS SYSTEM: Sleepy but arousable a&ox3    _________________________________________________  LABS:                        12.4   5.69  )-----------( 167      ( 05 Aug 2021 06:06 )             35.9     08-05    136  |  99  |  7   ----------------------------<  118<H>  3.2<L>   |  28  |  0.49<L>    Ca    8.7      05 Aug 2021 06:06  Phos  3.0     08-05  Mg     1.8     08-05    TPro  6.5  /  Alb  2.9<L>  /  TBili  0.9  /  DBili  x   /  AST  45<H>  /  ALT  66<H>  /  AlkPhos  43  08-05        CAPILLARY BLOOD GLUCOSE    Imaging Personally Reviewed:  YES    Consultant(s) Notes Reviewed:   YES    Plan of care was discussed with patient and /or primary care giver; all questions and concerns were addressed and care was aligned with patient's wishes.

## 2021-08-05 NOTE — PROGRESS NOTE ADULT - PROBLEM SELECTOR PLAN 5
Patient from home   multiple admission to [facility]  and [facility]   SW to follow

## 2021-08-06 LAB
ANION GAP SERPL CALC-SCNC: 5 MMOL/L — SIGNIFICANT CHANGE UP (ref 5–17)
BUN SERPL-MCNC: 11 MG/DL — SIGNIFICANT CHANGE UP (ref 7–18)
CALCIUM SERPL-MCNC: 9 MG/DL — SIGNIFICANT CHANGE UP (ref 8.4–10.5)
CHLORIDE SERPL-SCNC: 105 MMOL/L — SIGNIFICANT CHANGE UP (ref 96–108)
CO2 SERPL-SCNC: 26 MMOL/L — SIGNIFICANT CHANGE UP (ref 22–31)
CREAT SERPL-MCNC: 0.44 MG/DL — LOW (ref 0.5–1.3)
GLUCOSE SERPL-MCNC: 84 MG/DL — SIGNIFICANT CHANGE UP (ref 70–99)
HCT VFR BLD CALC: 36.7 % — SIGNIFICANT CHANGE UP (ref 34.5–45)
HGB BLD-MCNC: 12.4 G/DL — SIGNIFICANT CHANGE UP (ref 11.5–15.5)
MCHC RBC-ENTMCNC: 31.3 PG — SIGNIFICANT CHANGE UP (ref 27–34)
MCHC RBC-ENTMCNC: 33.8 GM/DL — SIGNIFICANT CHANGE UP (ref 32–36)
MCV RBC AUTO: 92.7 FL — SIGNIFICANT CHANGE UP (ref 80–100)
NRBC # BLD: 0 /100 WBCS — SIGNIFICANT CHANGE UP (ref 0–0)
PLATELET # BLD AUTO: 185 K/UL — SIGNIFICANT CHANGE UP (ref 150–400)
POTASSIUM SERPL-MCNC: 4.1 MMOL/L — SIGNIFICANT CHANGE UP (ref 3.5–5.3)
POTASSIUM SERPL-SCNC: 4.1 MMOL/L — SIGNIFICANT CHANGE UP (ref 3.5–5.3)
RBC # BLD: 3.96 M/UL — SIGNIFICANT CHANGE UP (ref 3.8–5.2)
RBC # FLD: 13.5 % — SIGNIFICANT CHANGE UP (ref 10.3–14.5)
SODIUM SERPL-SCNC: 136 MMOL/L — SIGNIFICANT CHANGE UP (ref 135–145)
WBC # BLD: 5.29 K/UL — SIGNIFICANT CHANGE UP (ref 3.8–10.5)
WBC # FLD AUTO: 5.29 K/UL — SIGNIFICANT CHANGE UP (ref 3.8–10.5)

## 2021-08-06 PROCEDURE — 99239 HOSP IP/OBS DSCHRG MGMT >30: CPT

## 2021-08-06 PROCEDURE — 90792 PSYCH DIAG EVAL W/MED SRVCS: CPT | Mod: 95

## 2021-08-06 RX ORDER — NALTREXONE HYDROCHLORIDE 50 MG/1
50 TABLET, FILM COATED ORAL DAILY
Refills: 0 | Status: DISCONTINUED | OUTPATIENT
Start: 2021-08-06 | End: 2021-08-07

## 2021-08-06 RX ORDER — NALTREXONE HYDROCHLORIDE 50 MG/1
1 TABLET, FILM COATED ORAL
Qty: 30 | Refills: 0
Start: 2021-08-06

## 2021-08-06 RX ORDER — CITALOPRAM 10 MG/1
1 TABLET, FILM COATED ORAL
Qty: 30 | Refills: 0
Start: 2021-08-06

## 2021-08-06 RX ADMIN — Medication 1 MILLIGRAM(S): at 08:50

## 2021-08-06 RX ADMIN — Medication 1 MILLIGRAM(S): at 18:18

## 2021-08-06 RX ADMIN — Medication 100 MILLIGRAM(S): at 08:50

## 2021-08-06 RX ADMIN — CITALOPRAM 20 MILLIGRAM(S): 10 TABLET, FILM COATED ORAL at 08:50

## 2021-08-06 RX ADMIN — NALTREXONE HYDROCHLORIDE 50 MILLIGRAM(S): 50 TABLET, FILM COATED ORAL at 18:18

## 2021-08-06 RX ADMIN — Medication 1 TABLET(S): at 08:49

## 2021-08-06 RX ADMIN — Medication 2 MILLIGRAM(S): at 02:05

## 2021-08-06 RX ADMIN — Medication 2 MILLIGRAM(S): at 08:49

## 2021-08-06 RX ADMIN — Medication 1 MILLIGRAM(S): at 12:39

## 2021-08-06 NOTE — BH CONSULTATION LIAISON ASSESSMENT NOTE - OTHER PAST PSYCHIATRIC HISTORY (INCLUDE DETAILS REGARDING ONSET, COURSE OF ILLNESS, INPATIENT/OUTPATIENT TREATMENT)
depression; reported hx of one prior psych hosp, no hx of Sa/SIB, engagement in tx w/ therapist at [facility]; no current med

## 2021-08-06 NOTE — DISCHARGE NOTE PROVIDER - HOSPITAL COURSE
Patient is a 36 year old female patient, w/ PMH of alcohol abuse/withdrawal, depression, PTSD, and HTN, who came to the ED after being found with 10 bottle of empty tequila around her.   Admitted for ETOH abuse, started on CIWA and ativan taper.  CIWA remains 0, no use of PRNs in last 24 hours. Telepsych consulted for patient's request tearfulness. Reccs appreciated,  Plan for discharge home tomorrow  with outpatient follow up       -*-*-INCOMPLETE Patient is a 36 year old female patient, w/ PMH of alcohol abuse/withdrawal, depression, PTSD, and HTN, who came to the ED after being found with 10 bottle of empty tequila around her.   Admitted for ETOH abuse, started on CIWA and ativan taper.  CIWA remains 0, no use of PRNs in last 24 hours. Telepsych consulted for patient's request tearfulness. Reccs appreciated,    She is stable for discharge today and will be discharged for outpatient follow up.

## 2021-08-06 NOTE — DISCHARGE NOTE PROVIDER - NSDCPNSUBOBJ_GEN_ALL_CORE
Patient was discharged today before being seen by me. Discussed with BEBETO Joaquin. CIWA was 0. Patient denied any complains.   I spoke with patient in detail about discharge plan yesterday evening. She insisted to leave today am. Discussed about risk of alcohol withdrawal specially risk of DT on Day 3-4, alcohol withdrawal seizures. Patient reports she is looking forward to this trip and wants to make it by any means. Advised to return to hospital if symptoms worsens. Patient verbalized understanding.
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
## 2021-08-06 NOTE — PROGRESS NOTE ADULT - PROBLEM SELECTOR PLAN 2
history of HTN  - No home Rx  Controlled at this time   SBP 120s telepsych consulted telepsych consulted  -can continue celexa 20mg po qdaily - for depression, PTSD  -add naltrexone 50mg po qdaily - for etoh dependence (ensure no opioids in the past 7-10 days)  -outpt mental health services:   - can follow up w/ [facility] ([phone] [address];  [phone])

## 2021-08-06 NOTE — BH CONSULTATION LIAISON ASSESSMENT NOTE - DESCRIPTION
, domiciled, from [place], has 17yo son from previous marriage - not living w/ her; working at [employer]

## 2021-08-06 NOTE — PROGRESS NOTE ADULT - PROBLEM SELECTOR PLAN 5
Patient from home   multiple admission to [facility]  and [facility]   SW to follow not indicated  Plan: RISK                                                          Points  [] Previous VTE                                           3  [] Thrombophilia                                        2  [] Lower limb paralysis                              2   [] Current Cancer                                       2   [] Immobilization > 24 hrs                        1  [] ICU/CCU stay > 24 hours                       1  [] Age > 60                                                   1

## 2021-08-06 NOTE — BH CONSULTATION LIAISON ASSESSMENT NOTE - CURRENT MEDICATION
MEDICATIONS  (STANDING):  citalopram 20 milliGRAM(s) Oral daily  folic acid 1 milliGRAM(s) Oral daily  LORazepam     Tablet 1 milliGRAM(s) Oral two times a day  multivitamin 1 Tablet(s) Oral daily  thiamine 100 milliGRAM(s) Oral daily    MEDICATIONS  (PRN):  diphenhydrAMINE   Injectable 50 milliGRAM(s) IntraMuscular every 6 hours PRN Rash and/or Itching  LORazepam   Injectable 2 milliGRAM(s) IV Push every 2 hours PRN Ciwa > 8

## 2021-08-06 NOTE — DISCHARGE NOTE PROVIDER - NSDCMRMEDTOKEN_GEN_ALL_CORE_FT
citalopram 20 mg oral tablet: 1 tab(s) orally once a day  folic acid 1 mg oral tablet: 1 tab(s) orally once a day  Multiple Vitamins oral tablet: 1 tab(s) orally once a day  naltrexone 50 mg oral tablet: 1 tab(s) orally once a day  thiamine 100 mg oral tablet: 1 tab(s) orally once a day

## 2021-08-06 NOTE — DISCHARGE NOTE PROVIDER - CARE PROVIDER_API CALL
CONRADO CASTELLON  Internal Medicine  [address] [place] [address]  Phone: [phone]  Fax: [phone]  Follow Up Time: 1-3 days    Precious Gross,   [address] [address]  Phone: [phone]  Fax: (   )    -  Follow Up Time: 1-3 days

## 2021-08-06 NOTE — BH CONSULTATION LIAISON ASSESSMENT NOTE - NSBHCONSULTRECOMMENDOTHER_PSY_A_CORE FT
-can continue celexa 20mg po qdaily - for depression, PTSD  -add naltrexone 50mg po qdaily - for etoh dependence (ensure no opioids in the past 7-10 days)  -outpt mental health services:   - can follow up w/ [facility] ([phone] [address];  [phone])   - additional mental health and substance treatment resources also provided to pt  -psych cleared for d/c  -no psych indication for 1:1

## 2021-08-06 NOTE — DISCHARGE NOTE PROVIDER - NSDCCPCAREPLAN_GEN_ALL_CORE_FT
PRINCIPAL DISCHARGE DIAGNOSIS  Diagnosis: Alcohol abuse  Assessment and Plan of Treatment: Alcohol abuse means you drink more than the recommended daily or weekly limits. You may be drinking alcohol regularly or drinking large amounts in a short period of time (binge drinking). You continue to drink even though it causes legal, work, or relationship problems. What can I do to manage my alcohol use?  Decrease the amount you drink. This can help prevent health problems such as brain, heart, and liver damage, high blood pressure, diabetes, and cancer. If you cannot stop completely, healthcare providers can help you set goals to decrease the amount you drink.  Plan weekly alcohol use. You will be less likely to drink more than the recommended limit if you plan ahead.  Have food when you drink alcohol. Food will prevent alcohol from getting into your system too quickly. Eat before you have your first alcohol drink.  Time your drinks carefully. Have no more than 1 drink in an hour. Have a liquid such as water, coffee, or a soft drink between alcohol drinks.  Do not drive if you have had alcohol. Make sure someone who has not been drinking can help you get home.  Do not drink alcohol if you are taking medicine. Alcohol is dangerous when you combine it with certain medicines, such as acetaminophen or blood pressure medicine. Talk to your healthcare provider about all the medicines you currently take. Where can I find support and more information?  Alcoholics Anonymous  Web Address: http://www.aa.org  Substance Abuse and Mental Health Services Administration  [address]  [place] [address]  Web Address: http://www.[facility].gov      SECONDARY DISCHARGE DIAGNOSES  Diagnosis: Depression  Assessment and Plan of Treatment: Depression is a medical condition that causes feelings of sadness or hopelessness that do not go away. Depression may cause you to lose interest in things you used to enjoy. These feelings may interfere with your daily life. How is depression treated?  Therapy is often used together with medicine. Therapy is a way for you to talk about your feelings and anything that may be causing depression. Therapy can be done alone or in a group. It may also be done with family members or a significant other.  Antidepressant medicine may be given to relieve depression. You may need to take this medicine for several weeks before you begin to feel better. Tell your healthcare provider about any side effects or problems you have with your medicine. The type or amount of medicine may need to be changed. How can I manage depression?  Get regular physical activity. Try to be active for 30 minutes, 3 to 5 days a week. Physical activity can help relieve depression. Work with your healthcare provider to develop a plan that you enjoy. It may help to ask someone to be active with you.  Create a regular sleep schedule. A routine can help you relax before bed. Listen to music, read, or do yoga. Try to go to bed and wake up at the same time every day. Sleep is important for emotional health.  Eat a variety of healthy foods. Healthy foods include fruits, vegetables, whole-grain breads, low-fat dairy products, lean meats, fish, and cooked beans. A healthy meal plan is low in fat, salt, and added sugar.  Do not drink alcohol or use drugs. Alcohol and drugs can make depression worse. Talk to your therapist or doctor if you need help quitting.  The following resources are available at any time to help you, if needed:  National Suicide Prevention Lifeline: 1-999.554.4845 (7-442-603-TALK)  Suicide Hotline: 1-507.357.8894 (4-724-CNGUDKF)  For a list of international numbers: https://save.org/find-help/international-resources/  Call your local emergency number (911 in the [place]) if:  You think about zackary     PRINCIPAL DISCHARGE DIAGNOSIS  Diagnosis: Alcohol abuse  Assessment and Plan of Treatment: Alcohol abuse means you drink more than the recommended daily or weekly limits. You may be drinking alcohol regularly or drinking large amounts in a short period of time (binge drinking). You continue to drink even though it causes legal, work, or relationship problems. What can I do to manage my alcohol use?  Decrease the amount you drink. This can help prevent health problems such as brain, heart, and liver damage, high blood pressure, diabetes, and cancer. If you cannot stop completely, healthcare providers can help you set goals to decrease the amount you drink.  Plan weekly alcohol use. You will be less likely to drink more than the recommended limit if you plan ahead.  Have food when you drink alcohol. Food will prevent alcohol from getting into your system too quickly. Eat before you have your first alcohol drink.  Time your drinks carefully. Have no more than 1 drink in an hour. Have a liquid such as water, coffee, or a soft drink between alcohol drinks.  Do not drive if you have had alcohol. Make sure someone who has not been drinking can help you get home.  Do not drink alcohol if you are taking medicine. Alcohol is dangerous when you combine it with certain medicines, such as acetaminophen or blood pressure medicine. Talk to your healthcare provider about all the medicines you currently take. Where can I find support and more information?  Alcoholics Anonymous  Web Address: http://www.aa.org  Substance Abuse and Mental Health Services Administration  [address]  [place] [address]  Web Address: http://www.[facility].gov      SECONDARY DISCHARGE DIAGNOSES  Diagnosis: Depression  Assessment and Plan of Treatment: Depression is a medical condition that causes feelings of sadness or hopelessness that do not go away. Depression may cause you to lose interest in things you used to enjoy. These feelings may interfere with your daily life. How is depression treated?  Therapy is often used together with medicine. Therapy is a way for you to talk about your feelings and anything that may be causing depression. Therapy can be done alone or in a group. It may also be done with family members or a significant other.  Antidepressant medicine may be given to relieve depression. You may need to take this medicine for several weeks before you begin to feel better. Tell your healthcare provider about any side effects or problems you have with your medicine. The type or amount of medicine may need to be changed. How can I manage depression?  Get regular physical activity. Try to be active for 30 minutes, 3 to 5 days a week. Physical activity can help relieve depression. Work with your healthcare provider to develop a plan that you enjoy. It may help to ask someone to be active with you.  Create a regular sleep schedule. A routine can help you relax before bed. Listen to music, read, or do yoga. Try to go to bed and wake up at the same time every day. Sleep is important for emotional health.  Eat a variety of healthy foods. Healthy foods include fruits, vegetables, whole-grain breads, low-fat dairy products, lean meats, fish, and cooked beans. A healthy meal plan is low in fat, salt, and added sugar.  Do not drink alcohol or use drugs. Alcohol and drugs can make depression worse. Talk to your therapist or doctor if you need help quitting.  The following resources are available at any time to help you, if needed:  National Suicide Prevention Lifeline: 1-461.559.9995 (4-550-405-TALK)  Suicide Hotline: 1-527.930.9976 (1-521-DQVGGZG)  For a list of international numbers: https://save.org/find-help/international-resources/  Call your local emergency number (911 in the [place]) if:       PRINCIPAL DISCHARGE DIAGNOSIS  Diagnosis: Alcohol abuse  Assessment and Plan of Treatment: Can follow up w/ [facility] ([address] [address];  [phone])   - additional mental health and substance treatment resources also provided to you.  - You are prescribed Naltrexone. Please do not use opioid at the same time, it can be life threatening.   Alcohol abuse means you drink more than the recommended daily or weekly limits. You may be drinking alcohol regularly or drinking large amounts in a short period of time (binge drinking). You continue to drink even though it causes legal, work, or relationship problems. What can I do to manage my alcohol use?  Decrease the amount you drink. This can help prevent health problems such as brain, heart, and liver damage, high blood pressure, diabetes, and cancer. If you cannot stop completely, healthcare providers can help you set goals to decrease the amount you drink.  Plan weekly alcohol use. You will be less likely to drink more than the recommended limit if you plan ahead.  Have food when you drink alcohol. Food will prevent alcohol from getting into your system too quickly. Eat before you have your first alcohol drink.  Time your drinks carefully. Have no more than 1 drink in an hour. Have a liquid such as water, coffee, or a soft drink between alcohol drinks.  Do not drive if you have had alcohol. Make sure someone who has not been drinking can help you get home.  Do not drink alcohol if you are taking medicine. Alcohol is dangerous when you combine it with certain medicines. Talk to your healthcare provider about all the medicines you currently take. Where can I find support and more information?  Alcoholics Anonymous  Web Address: http://www.aa.org        SECONDARY DISCHARGE DIAGNOSES  Diagnosis: Depression  Assessment and Plan of Treatment: Depression is a medical condition that causes feelings of sadness or hopelessness that do not go away. Depression may cause you to lose interest in things you used to enjoy. These feelings may interfere with your daily life. How is depression treated?  Therapy is often used together with medicine. Therapy is a way for you to talk about your feelings and anything that may be causing depression. Therapy can be done alone or in a group. It may also be done with family members or a significant other.  Antidepressant medicine may be given to relieve depression. You may need to take this medicine for several weeks before you begin to feel better. Tell your healthcare provider about any side effects or problems you have with your medicine. The type or amount of medicine may need to be changed. How can I manage depression?  Get regular physical activity. Try to be active for 30 minutes, 3 to 5 days a week. Physical activity can help relieve depression. Work with your healthcare provider to develop a plan that you enjoy. It may help to ask someone to be active with you.  Create a regular sleep schedule. A routine can help you relax before bed. Listen to music, read, or do yoga. Try to go to bed and wake up at the same time every day. Sleep is important for emotional health.  Eat a variety of healthy foods. Healthy foods include fruits, vegetables, whole-grain breads, low-fat dairy products, lean meats, fish, and cooked beans. A healthy meal plan is low in fat, salt, and added sugar.  Do not drink alcohol or use drugs. Alcohol and drugs can make depression worse. Talk to your therapist or doctor if you need help quitting.  The following resources are available at any time to help you, if needed:  National Suicide Prevention Lifeline: 1-524.600.8425 (0-880-223-TALK)  Suicide Hotline: 1-918.488.4457 (4-584-ITMOSCB)  For a list of international numbers: https://save.org/find-help/international-resources/  Call your local emergency number (911 in the [place]) if:

## 2021-08-06 NOTE — PROGRESS NOTE ADULT - SUBJECTIVE AND OBJECTIVE BOX
NP Note discussed with  Primary Attending    INTERVAL HPI/OVERNIGHT EVENTS: CIWA 0 calm and cooperative with staff     MEDICATIONS  (STANDING):  citalopram 20 milliGRAM(s) Oral daily  folic acid 1 milliGRAM(s) Oral daily  LORazepam     Tablet 1 milliGRAM(s) Oral two times a day  multivitamin 1 Tablet(s) Oral daily  thiamine 100 milliGRAM(s) Oral daily    MEDICATIONS  (PRN):  diphenhydrAMINE   Injectable 50 milliGRAM(s) IntraMuscular every 6 hours PRN Rash and/or Itching  LORazepam   Injectable 2 milliGRAM(s) IV Push every 2 hours PRN Ciwa > 8      __________________________________________________  REVIEW OF SYSTEMS:    CONSTITUTIONAL: No fever,   EYES: no acute visual disturbances  NECK: No pain or stiffness  RESPIRATORY: No cough; No shortness of breath  CARDIOVASCULAR: No chest pain, no palpitations  GASTROINTESTINAL: No pain. No nausea or vomiting; No diarrhea   NEUROLOGICAL: No headache or numbness, no tremors  MUSCULOSKELETAL: No joint pain, no muscle pain  GENITOURINARY: no dysuria, no frequency, no hesitancy  PSYCHIATRY: no depression , no anxiety  ALL OTHER  ROS negative        Vital Signs Last 24 Hrs  T(C): 36.9 (06 Aug 2021 12:03), Max: 37.1 (05 Aug 2021 21:00)  T(F): 98.5 (06 Aug 2021 12:03), Max: 98.7 (05 Aug 2021 21:00)  HR: 81 (06 Aug 2021 12:03) (80 - 92)  BP: 143/88 (06 Aug 2021 12:03) (141/87 - 147/81)  BP(mean): --  RR: 16 (06 Aug 2021 12:03) (16 - 20)  SpO2: 98% (06 Aug 2021 12:03) (98% - 100%)    ________________________________________________  PHYSICAL EXAM:  GENERAL: NAD  HEENT: Normocephalic;  conjunctivae and sclerae clear; moist mucous membranes;   NECK : supple  CHEST/LUNG: Clear to auscultation bilaterally with good air entry   HEART: S1 S2  regular; no murmurs, gallops or rubs  ABDOMEN: Soft, Nontender, Nondistended; Bowel sounds present  EXTREMITIES: no cyanosis; no edema; no calf tenderness  SKIN: warm and dry; no rash  NERVOUS SYSTEM:  Awake and alert; Oriented  to place, person and time ; no new deficits    _________________________________________________  LABS:                        12.4   5.29  )-----------( 185      ( 06 Aug 2021 08:10 )             36.7     08-06    136  |  105  |  11  ----------------------------<  84  4.1   |  26  |  0.44<L>    Ca    9.0      06 Aug 2021 08:10  Phos  3.0     08-05  Mg     1.8     08-05    TPro  6.5  /  Alb  2.9<L>  /  TBili  0.9  /  DBili  x   /  AST  45<H>  /  ALT  66<H>  /  AlkPhos  43  08-05        CAPILLARY BLOOD GLUCOSE            RADIOLOGY & ADDITIONAL TESTS:    Imaging Personally Reviewed:  YES/NO    Consultant(s) Notes Reviewed:   YES/ No    Care Discussed with Consultants :     Plan of care was discussed with patient and /or primary care giver; all questions and concerns were addressed and care was aligned with patient's wishes.     NP Note discussed with  Primary Attending    INTERVAL HPI/OVERNIGHT EVENTS: CIWA 0 calm and cooperative with staff     MEDICATIONS  (STANDING):  citalopram 20 milliGRAM(s) Oral daily  folic acid 1 milliGRAM(s) Oral daily  LORazepam     Tablet 1 milliGRAM(s) Oral two times a day  multivitamin 1 Tablet(s) Oral daily  thiamine 100 milliGRAM(s) Oral daily    MEDICATIONS  (PRN):  diphenhydrAMINE   Injectable 50 milliGRAM(s) IntraMuscular every 6 hours PRN Rash and/or Itching  LORazepam   Injectable 2 milliGRAM(s) IV Push every 2 hours PRN Ciwa > 8      __________________________________________________  REVIEW OF SYSTEMS:    CONSTITUTIONAL: No fever,   EYES: no acute visual disturbances  NECK: No pain or stiffness  RESPIRATORY: No cough; No shortness of breath  CARDIOVASCULAR: No chest pain, no palpitations  GASTROINTESTINAL: No pain. No nausea or vomiting; No diarrhea   NEUROLOGICAL: No headache or numbness, no tremors  MUSCULOSKELETAL: No joint pain, no muscle pain  GENITOURINARY: no dysuria, no frequency, no hesitancy  PSYCHIATRY: no depression , no anxiety  ALL OTHER  ROS negative        Vital Signs Last 24 Hrs  T(C): 36.9 (06 Aug 2021 12:03), Max: 37.1 (05 Aug 2021 21:00)  T(F): 98.5 (06 Aug 2021 12:03), Max: 98.7 (05 Aug 2021 21:00)  HR: 81 (06 Aug 2021 12:03) (80 - 92)  BP: 143/88 (06 Aug 2021 12:03) (141/87 - 147/81)  BP(mean): --  RR: 16 (06 Aug 2021 12:03) (16 - 20)  SpO2: 98% (06 Aug 2021 12:03) (98% - 100%)    ________________________________________________  PHYSICAL EXAM:  GENERAL: NAD  HEENT: Normocephalic;  conjunctivae and sclerae clear;  NECK : supple  CHEST/LUNG: Clear to auscultation bilaterally with good air entry   HEART: S1 S2  regular; no murmurs, gallops or rubs  ABDOMEN: Soft, Nontender, Nondistended; Bowel sounds present  EXTREMITIES: no cyanosis; no edema; no calf tenderness  SKIN: warm and dry; no rash  NERVOUS SYSTEM:  Awake and alert; Oriented  to place, person and time  _________________________________________________  LABS:                        12.4   5.29  )-----------( 185      ( 06 Aug 2021 08:10 )             36.7     08-06    136  |  105  |  11  ----------------------------<  84  4.1   |  26  |  0.44<L>    Ca    9.0      06 Aug 2021 08:10  Phos  3.0     08-05  Mg     1.8     08-05    TPro  6.5  /  Alb  2.9<L>  /  TBili  0.9  /  DBili  x   /  AST  45<H>  /  ALT  66<H>  /  AlkPhos  43  08-05        CAPILLARY BLOOD GLUCOSE          Imaging Personally Reviewed:  YES    Plan of care was discussed with patient and /or primary care giver; all questions and concerns were addressed and care was aligned with patient's wishes.

## 2021-08-06 NOTE — DISCHARGE NOTE PROVIDER - PROVIDER TOKENS
PROVIDER:[TOKEN:[[id number]],FOLLOWUP:[1-3 days]],FREE:[LAST:[Precious Gross],PHONE:[phone]],FAX:[(   )    -],ADDRESS:[[phone] [place] [address]],FOLLOWUP:[1-3 days]]

## 2021-08-06 NOTE — PROGRESS NOTE ADULT - PROBLEM SELECTOR PLAN 1
- ETOH 289 on admission  - In ED, s/p Ativan 2mg, Benadryl 50mg, Haldol 5mg.  - CIWA 0 currently  - On Thiamine, folic acid, and multivitamins   - Started on Ativan 2mg IV q4hrs, and 2mg PRN if CIWA>8 - ETOH 289 on admission  - In ED, s/p Ativan 2mg, Benadryl 50mg, Haldol 5mg.  - CIWA remains 0  - On Thiamine, folic acid, and multivitamins   no use of PRNs

## 2021-08-06 NOTE — BH CONSULTATION LIAISON ASSESSMENT NOTE - NSBHCHARTREVIEWVS_PSY_A_CORE FT
Vital Signs Last 24 Hrs  T(C): 36.5 (06 Aug 2021 04:52), Max: 37.1 (05 Aug 2021 21:00)  T(F): 97.7 (06 Aug 2021 04:52), Max: 98.7 (05 Aug 2021 21:00)  HR: 92 (06 Aug 2021 04:52) (80 - 92)  BP: 141/87 (06 Aug 2021 04:52) (141/87 - 152/97)  BP(mean): --  RR: 19 (06 Aug 2021 04:52) (18 - 20)  SpO2: 99% (06 Aug 2021 04:52) (99% - 100%)

## 2021-08-06 NOTE — BH CONSULTATION LIAISON ASSESSMENT NOTE - NSICDXPASTMEDICALHX_GEN_ALL_CORE_FT
PAST MEDICAL HISTORY:  Alcohol abuse     Alcohol withdrawal seizure     Depression     Hepatic steatosis     HTN (hypertension)     Post traumatic stress disorder (PTSD)

## 2021-08-06 NOTE — PROGRESS NOTE ADULT - PROBLEM SELECTOR PLAN 6
Patient from home   multiple admission to [facility]  and [facility]   SW to follow Patient from home   multiple admission to [facility]  and [facility]   SW and psych consulted   plan for discharge tomorrow to outpatient follow up

## 2021-08-06 NOTE — PROGRESS NOTE ADULT - PROBLEM SELECTOR PLAN 4
not indicated  Plan: RISK                                                          Points  [] Previous VTE                                           3  [] Thrombophilia                                        2  [] Lower limb paralysis                              2   [] Current Cancer                                       2   [] Immobilization > 24 hrs                        1  [] ICU/CCU stay > 24 hours                       1  [] Age > 60                                                   1 Controlled  A1C 5.3
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
## 2021-08-06 NOTE — PROGRESS NOTE ADULT - ATTENDING COMMENTS
Patient was seen and examined at bedside today. Very cryful. Reports feeling sad "what will happen to me". Denies SI or HI.  Denies any other complains. CIWA 0    Vitals stable    ROS as above     P/E:  NAD  AAOx3, no focal deficit  CTABL  S1S2 WNL, no MRG  Abd soft, non tender, BS present   BL LE no edema or calf tenderness     Labs noted     A/P:  Acute alcohol withdrawal  Depression  PTSD  Hepatic steatosis     Plan:  Cont Ativan taper, monitor CIWA   Thiamine, folic acid, IVF  Psych consult for depression and medication adjustment, uncontrolled depression might have contributed to the substance abuse   Cont Celexa 20mg for now   Monitor LFTs  Ambulation as tolerated, fall precaution
Patient was seen and examined today. Very pleasant, happy, denies any complains. CIWA 0. Wants to get discharged because she has a trip planned tomorrow. Denies SI, HI or feeling sad. Tapered down Ativan. Will monitor tomorrow

## 2021-08-07 VITALS
OXYGEN SATURATION: 100 % | HEART RATE: 62 BPM | RESPIRATION RATE: 18 BRPM | DIASTOLIC BLOOD PRESSURE: 92 MMHG | SYSTOLIC BLOOD PRESSURE: 153 MMHG | TEMPERATURE: 97 F

## 2021-08-07 PROCEDURE — 85027 COMPLETE CBC AUTOMATED: CPT

## 2021-08-07 PROCEDURE — 36415 COLL VENOUS BLD VENIPUNCTURE: CPT

## 2021-08-07 PROCEDURE — 80048 BASIC METABOLIC PNL TOTAL CA: CPT

## 2021-08-07 PROCEDURE — 96374 THER/PROPH/DIAG INJ IV PUSH: CPT

## 2021-08-07 PROCEDURE — 99285 EMERGENCY DEPT VISIT HI MDM: CPT | Mod: 25

## 2021-08-07 PROCEDURE — 80307 DRUG TEST PRSMV CHEM ANLYZR: CPT

## 2021-08-07 PROCEDURE — 93005 ELECTROCARDIOGRAM TRACING: CPT

## 2021-08-07 PROCEDURE — 83735 ASSAY OF MAGNESIUM: CPT

## 2021-08-07 PROCEDURE — 87635 SARS-COV-2 COVID-19 AMP PRB: CPT

## 2021-08-07 PROCEDURE — 80053 COMPREHEN METABOLIC PANEL: CPT

## 2021-08-07 PROCEDURE — 84100 ASSAY OF PHOSPHORUS: CPT

## 2021-08-07 PROCEDURE — 83036 HEMOGLOBIN GLYCOSYLATED A1C: CPT

## 2021-08-07 PROCEDURE — 86769 SARS-COV-2 COVID-19 ANTIBODY: CPT

## 2021-08-07 PROCEDURE — 96372 THER/PROPH/DIAG INJ SC/IM: CPT

## 2021-08-07 PROCEDURE — 85025 COMPLETE CBC W/AUTO DIFF WBC: CPT

## 2021-08-07 RX ADMIN — Medication 1 MILLIGRAM(S): at 05:50

## 2021-08-07 NOTE — DISCHARGE NOTE NURSING/CASE MANAGEMENT/SOCIAL WORK - PATIENT PORTAL LINK FT
You can access the FollowMyHealth Patient Portal offered by [facility] by registering at the following website: [email or web address]. By joining [facility]’s FollowMyHealth portal, you will also be able to view your health information using other applications (apps) compatible with our system.

## 2021-08-07 NOTE — CHART NOTE - NSCHARTNOTEFT_GEN_A_CORE
Patient seen at bedside this am.  Comfortable and in NAD.  Eager for discharge home.   VSS  CIWA 0  pulm: CTA  cardiac: S1, S2 RRR  abd, soft, NT  Plan:  Discharge home  Discussed with Dr. Lou.  Follow up as outpatient.

## 2021-08-17 ENCOUNTER — EMERGENCY (EMERGENCY)
Facility: HOSPITAL | Age: 36
LOS: 1 days | Discharge: HOME | End: 2021-08-17
Attending: STUDENT IN AN ORGANIZED HEALTH CARE EDUCATION/TRAINING PROGRAM
Payer: MEDICAID

## 2021-08-17 VITALS
SYSTOLIC BLOOD PRESSURE: 125 MMHG | HEIGHT: 65 IN | RESPIRATION RATE: 18 BRPM | DIASTOLIC BLOOD PRESSURE: 73 MMHG | HEART RATE: 120 BPM | TEMPERATURE: 99 F | OXYGEN SATURATION: 97 %

## 2021-08-17 PROBLEM — I10 ESSENTIAL (PRIMARY) HYPERTENSION: Chronic | Status: ACTIVE | Noted: 2021-08-04

## 2021-08-17 PROBLEM — K76.0 FATTY (CHANGE OF) LIVER, NOT ELSEWHERE CLASSIFIED: Chronic | Status: ACTIVE | Noted: 2021-08-04

## 2021-08-17 LAB
ALBUMIN SERPL ELPH-MCNC: 3.4 G/DL — LOW (ref 3.5–5)
ALP SERPL-CCNC: 50 U/L — SIGNIFICANT CHANGE UP (ref 40–120)
ALT FLD-CCNC: 103 U/L DA — HIGH (ref 10–60)
ANION GAP SERPL CALC-SCNC: 10 MMOL/L — SIGNIFICANT CHANGE UP (ref 5–17)
APAP SERPL-MCNC: <2 UG/ML — LOW (ref 10–30)
AST SERPL-CCNC: 65 U/L — HIGH (ref 10–40)
BASOPHILS # BLD AUTO: 0.08 K/UL — SIGNIFICANT CHANGE UP (ref 0–0.2)
BASOPHILS NFR BLD AUTO: 1.1 % — SIGNIFICANT CHANGE UP (ref 0–2)
BILIRUB SERPL-MCNC: 0.2 MG/DL — SIGNIFICANT CHANGE UP (ref 0.2–1.2)
BUN SERPL-MCNC: 11 MG/DL — SIGNIFICANT CHANGE UP (ref 7–18)
CALCIUM SERPL-MCNC: 7.9 MG/DL — LOW (ref 8.4–10.5)
CHLORIDE SERPL-SCNC: 112 MMOL/L — HIGH (ref 96–108)
CO2 SERPL-SCNC: 24 MMOL/L — SIGNIFICANT CHANGE UP (ref 22–31)
CREAT SERPL-MCNC: 0.77 MG/DL — SIGNIFICANT CHANGE UP (ref 0.5–1.3)
EOSINOPHIL # BLD AUTO: 0.19 K/UL — SIGNIFICANT CHANGE UP (ref 0–0.5)
EOSINOPHIL NFR BLD AUTO: 2.5 % — SIGNIFICANT CHANGE UP (ref 0–6)
ETHANOL SERPL-MCNC: 423 MG/DL — HIGH (ref 0–10)
GLUCOSE SERPL-MCNC: 122 MG/DL — HIGH (ref 70–99)
HCG SERPL-ACNC: <1 MIU/ML — SIGNIFICANT CHANGE UP
HCT VFR BLD CALC: 40.3 % — SIGNIFICANT CHANGE UP (ref 34.5–45)
HGB BLD-MCNC: 13.6 G/DL — SIGNIFICANT CHANGE UP (ref 11.5–15.5)
IMM GRANULOCYTES NFR BLD AUTO: 0.3 % — SIGNIFICANT CHANGE UP (ref 0–1.5)
LYMPHOCYTES # BLD AUTO: 3.6 K/UL — HIGH (ref 1–3.3)
LYMPHOCYTES # BLD AUTO: 47.7 % — HIGH (ref 13–44)
MAGNESIUM SERPL-MCNC: 2 MG/DL — SIGNIFICANT CHANGE UP (ref 1.6–2.6)
MCHC RBC-ENTMCNC: 31.3 PG — SIGNIFICANT CHANGE UP (ref 27–34)
MCHC RBC-ENTMCNC: 33.7 GM/DL — SIGNIFICANT CHANGE UP (ref 32–36)
MCV RBC AUTO: 92.6 FL — SIGNIFICANT CHANGE UP (ref 80–100)
MONOCYTES # BLD AUTO: 0.73 K/UL — SIGNIFICANT CHANGE UP (ref 0–0.9)
MONOCYTES NFR BLD AUTO: 9.7 % — SIGNIFICANT CHANGE UP (ref 2–14)
NEUTROPHILS # BLD AUTO: 2.92 K/UL — SIGNIFICANT CHANGE UP (ref 1.8–7.4)
NEUTROPHILS NFR BLD AUTO: 38.7 % — LOW (ref 43–77)
NRBC # BLD: 0 /100 WBCS — SIGNIFICANT CHANGE UP (ref 0–0)
PLATELET # BLD AUTO: 349 K/UL — SIGNIFICANT CHANGE UP (ref 150–400)
POTASSIUM SERPL-MCNC: 3.3 MMOL/L — LOW (ref 3.5–5.3)
POTASSIUM SERPL-SCNC: 3.3 MMOL/L — LOW (ref 3.5–5.3)
PROT SERPL-MCNC: 7.1 G/DL — SIGNIFICANT CHANGE UP (ref 6–8.3)
RBC # BLD: 4.35 M/UL — SIGNIFICANT CHANGE UP (ref 3.8–5.2)
RBC # FLD: 14.5 % — SIGNIFICANT CHANGE UP (ref 10.3–14.5)
SALICYLATES SERPL-MCNC: 1.7 MG/DL — LOW (ref 2.8–20)
SODIUM SERPL-SCNC: 146 MMOL/L — HIGH (ref 135–145)
WBC # BLD: 7.54 K/UL — SIGNIFICANT CHANGE UP (ref 3.8–10.5)
WBC # FLD AUTO: 7.54 K/UL — SIGNIFICANT CHANGE UP (ref 3.8–10.5)

## 2021-08-17 PROCEDURE — 99285 EMERGENCY DEPT VISIT HI MDM: CPT

## 2021-08-17 RX ORDER — MIDAZOLAM HYDROCHLORIDE 1 MG/ML
5 INJECTION, SOLUTION INTRAMUSCULAR; INTRAVENOUS ONCE
Refills: 0 | Status: DISCONTINUED | OUTPATIENT
Start: 2021-08-17 | End: 2021-08-17

## 2021-08-17 RX ORDER — SODIUM CHLORIDE 9 MG/ML
1000 INJECTION INTRAMUSCULAR; INTRAVENOUS; SUBCUTANEOUS ONCE
Refills: 0 | Status: COMPLETED | OUTPATIENT
Start: 2021-08-17 | End: 2021-08-17

## 2021-08-17 RX ORDER — HALOPERIDOL DECANOATE 100 MG/ML
5 INJECTION INTRAMUSCULAR ONCE
Refills: 0 | Status: COMPLETED | OUTPATIENT
Start: 2021-08-17 | End: 2021-08-17

## 2021-08-17 RX ORDER — POTASSIUM CHLORIDE 20 MEQ
40 PACKET (EA) ORAL ONCE
Refills: 0 | Status: COMPLETED | OUTPATIENT
Start: 2021-08-17 | End: 2021-08-17

## 2021-08-17 RX ADMIN — Medication 2 MILLIGRAM(S): at 17:08

## 2021-08-17 RX ADMIN — SODIUM CHLORIDE 1000 MILLILITER(S): 9 INJECTION INTRAMUSCULAR; INTRAVENOUS; SUBCUTANEOUS at 19:31

## 2021-08-17 RX ADMIN — MIDAZOLAM HYDROCHLORIDE 5 MILLIGRAM(S): 1 INJECTION, SOLUTION INTRAMUSCULAR; INTRAVENOUS at 17:15

## 2021-08-17 RX ADMIN — Medication 2 MILLIGRAM(S): at 22:47

## 2021-08-17 RX ADMIN — HALOPERIDOL DECANOATE 5 MILLIGRAM(S): 100 INJECTION INTRAMUSCULAR at 17:08

## 2021-08-17 RX ADMIN — HALOPERIDOL DECANOATE 5 MILLIGRAM(S): 100 INJECTION INTRAMUSCULAR at 22:47

## 2021-08-17 RX ADMIN — Medication 40 MILLIEQUIVALENT(S): at 22:48

## 2021-08-17 NOTE — ED ADULT NURSE NOTE - OBJECTIVE STATEMENT
Pt confused, combative, loud, refusing to asnwer questions and refusing care. BIBA for ETOH intox, as per EMS,  called 911 because pt was confused and combative and was drinking for 14 days straight. Pt placed on yellow gown, and constant observation for risk of elopement and harm to self. EKG done, pt placed on cardiac monitor.

## 2021-08-17 NOTE — ED PROVIDER NOTE - PATIENT PORTAL LINK FT
You can access the FollowMyHealth Patient Portal offered by [facility] by registering at the following website: [email or web address]. By joining [facility]’s FollowMyHealth portal, you will also be able to view your health information using other applications (apps) compatible with our system.

## 2021-08-17 NOTE — ED PROVIDER NOTE - OBJECTIVE STATEMENT
37 y/o female with PMHx alcohol abuse presents to ED c/o alcohol intoxication. Per ems, patient  called ambulance because patient had been drinking x14 days straight. Per ems, patient agitated and combative in ambulance and refusing to answer questions, required sedation. No other known complaints. NKDA. 37 y/o female with PMHx alcohol abuse presents to ED c/o alcohol intoxication. Per ems, patient  called ambulance because patient had been drinking x14 days straight. On arrival to ED, patient agitated and combative, refusing to answer questions. No other known complaints. NKDA.

## 2021-08-17 NOTE — ED PROVIDER NOTE - PROGRESS NOTE DETAILS
Samuel DO: Received sign out from Dr. Álvarez. Pt was intoxicated ETOH 423 at 17:41, initially combative/ agitated, requried haldol/ativan to relieve agitation. Will continue to monitor. Pt awake alert ambulatory. Ate meal. Refused SW intervention. Alcohol rehab referral options given.

## 2021-08-17 NOTE — ED PROVIDER NOTE - NSFOLLOWUPINSTRUCTIONS_ED_ALL_ED_FT
Abuse of Alcohol    WHAT YOU NEED TO KNOW:    What is alcohol abuse? Alcohol abuse means you drink more than the recommended daily or weekly limits. You may be drinking alcohol regularly or drinking large amounts in a short period of time (binge drinking). You continue to drink even though it causes legal, work, or relationship problems.    What do I need to know about recommended alcohol limits?   •Men 21 to 64 years should limit alcohol to 2 drinks a day. Do not have more than 4 drinks in 1 day or more than 14 in 1 week.      •All women, and men 65 or older should limit alcohol to 1 drink in a day. Do not have more than 3 drinks in 1 day or more than 7 in 1 week. No amount of alcohol is okay during pregnancy.      What are the signs and symptoms of alcohol abuse?   •Loss of interest in activities, work, and school      •Hiding alcohol, or drinking in private      •Depression, or guilt about drinking      •Constant thoughts about alcohol      •Drinking in the morning to relieve the effects of a hangover      •Not being able to control the amount you drink      •Restlessness, or erratic and violent behavior      What health problems can alcohol abuse cause?   •Cancer in your liver, pancreas, stomach, colon, kidney, or breast      •Stroke or a heart attack      •Liver, kidney, or lung disease      •Blackouts, memory loss, brain damage, or dementia      •Diabetes, immune system problems, or thiamine (vitamin B1) deficiency      •Problems for you and your baby if you drink while pregnant      How is alcohol abuse treated? Treatment helps you understand the reasons you abuse alcohol. Counselors and therapists provide you with support and help you find ways to cope instead of drinking. You may need inpatient treatment to provide a controlled environment. You may need outpatient treatment after your inpatient treatment is complete.  •Detoxification (detox) is a program used to flush alcohol from your body. During detox, medicines are given to help prevent withdrawal symptoms when you stop drinking alcohol.      •In brief intervention therapy, a healthcare provider helps you think about your alcohol use differently. He or she helps you set goals to decrease the amount of alcohol you drink. Therapy may continue after you leave the hospital.      •Vitamin supplements such as B1 may be needed. Alcohol can make it hard for your body to absorb enough vitamin B1. You may be given vitamin B1 if you have low levels. It is also given to prevent brain damage from alcohol use.      What can I do to manage my alcohol use?   •Decrease the amount you drink. This can help prevent health problems such as brain, heart, and liver damage, high blood pressure, diabetes, and cancer. If you cannot stop completely, healthcare providers can help you set goals to decrease the amount you drink.      •Plan weekly alcohol use. You will be less likely to drink more than the recommended limit if you plan ahead.      •Have food when you drink alcohol. Food will prevent alcohol from getting into your system too quickly. Eat before you have your first alcohol drink.      •Time your drinks carefully. Have no more than 1 drink in an hour. Have a liquid such as water, coffee, or a soft drink between alcohol drinks.      •Do not drive if you have had alcohol. Make sure someone who has not been drinking can help you get home.      •Do not drink alcohol if you are taking medicine. Alcohol is dangerous when you combine it with certain medicines, such as acetaminophen or blood pressure medicine. Talk to your healthcare provider about all the medicines you currently take.      Where can I find support and more information?   •Alcoholics Anonymous  Web Address: http://www.aa.org      •Substance Abuse and Mental Health Services Administration  [address]  [place] [address]  Web Address: http://www.[facility].gov        Call your local emergency number (911 in the [place]) for any of the following:   •You have sudden chest pain or trouble breathing.      •You want to harm yourself or others.      •You have a seizure or have shaking or trembling.      When should I call my doctor?   •You have hallucinations (you see or hear things that are not real).      •You cannot stop vomiting or you vomit blood.      •You need help to stop drinking alcohol.       •You have questions or concerns about your condition or care.      CARE AGREEMENT:    You have the right to help plan your care. Learn about your health condition and how it may be treated. Discuss treatment options with your healthcare providers to decide what care you want to receive. You always have the right to refuse treatment.        © Copyright [employer] 2021

## 2021-08-17 NOTE — ED PROVIDER NOTE - PHYSICAL EXAMINATION
Gen: AAOx3, non-toxic  Head: NCAT  HEENT: EOMI, oral mucosa moist, normal conjunctiva  Lung: CTAB, no respiratory distress, no wheezes/rhonchi/rales B/L, speaking in full sentences  CV: RRR, no murmurs, rubs or gallop, tachycardia   Abd: soft, NTND, no guarding, no CVA tenderness  MSK: no visible deformities  Neuro: No focal sensory or motor deficits  Skin: Warm, well perfused, no rash    Sherif Álvarez, DO Gen: intoxicated, agitated   Head: NCAT  HEENT: EOMI, oral mucosa moist, normal conjunctiva  Lung: CTAB  CV: tachycardia. no murmurs, rubs or gallop   Abd: soft, NTND  MSK: no visible deformities  Neuro: No focal sensory or motor deficits  Skin: Warm, well perfused, no rash    Sherif Álvarez DO

## 2021-08-17 NOTE — ED PROVIDER NOTE - CLINICAL SUMMARY MEDICAL DECISION MAKING FREE TEXT BOX
37 y/o female presents with alcohol intoxication, agitated and combative requiring 5 and 2. Patient remains combative for several minutes requiring additional sedation. Will obtain labs and monitor closely until clinically sober. 35 y/o female presents with alcohol intoxication, agitated and combative requiring 5 and 2. Patient remained combative for several minutes requiring additional sedation. Will obtain labs and monitor closely until clinically sober.

## 2021-08-18 VITALS — SYSTOLIC BLOOD PRESSURE: 161 MMHG | HEART RATE: 89 BPM | TEMPERATURE: 98 F | DIASTOLIC BLOOD PRESSURE: 98 MMHG

## 2021-08-18 PROCEDURE — 83735 ASSAY OF MAGNESIUM: CPT

## 2021-08-18 PROCEDURE — 80053 COMPREHEN METABOLIC PANEL: CPT

## 2021-08-18 PROCEDURE — 80307 DRUG TEST PRSMV CHEM ANLYZR: CPT

## 2021-08-18 PROCEDURE — 99285 EMERGENCY DEPT VISIT HI MDM: CPT

## 2021-08-18 PROCEDURE — 85025 COMPLETE CBC W/AUTO DIFF WBC: CPT

## 2021-08-18 PROCEDURE — 84702 CHORIONIC GONADOTROPIN TEST: CPT

## 2021-08-18 PROCEDURE — 36415 COLL VENOUS BLD VENIPUNCTURE: CPT

## 2021-08-18 RX ADMIN — Medication 50 MILLIGRAM(S): at 10:40

## 2021-08-18 NOTE — SBIRT NOTE ADULT - NSSBIRTUNABLESCROTHER_GEN_A_CORE
Pt declined screening but admitted to excessively alcohol intake. Pt was counseled /educated re alcohol. She was provided with AOSAS resources.

## 2021-08-23 NOTE — CHART NOTE - NSCHARTNOTEFT_GEN_A_CORE
Sr. JIM Manager received outreach from pt spouse Gallito expressing he was in need of a referral for SA pt placement at [facility]. Sr. JIM Manager informed Gallito pt would have to give consent in order for SW team to send pt referral as SW would be required to release clinical information and PHI. Gallito expressed frustration and stated he "he does not feel like he is being assisted". DIANDRA acknowledged Gallito's concerns and offered to assist as needed, but reiterated pt would have to provide consent before any PHI is released. Gallito reports pt is currently heavily intoxicated and unable ot participate in phone conversation. Gallito expressed he will be calling 911 and have pt returned to the ED for medical assistance as pt have been drinking alcohol for multiple days since most recent d/c. DIANDRA expressed understanding and offered to assist in a plan development prior to pt arrival. Gallito reports a plan development was not necessary and disconnected the call. Sr. JIM remains available to assist as needed.
Positive sbirt consult    Pt is  a 36 year old  female with PMHx alcohol abuse presents to ED c/o alcohol intoxication. Per ems, Pt's   called ambulance because Pt  had been drinking x14 days straight. On arrival to ED, Pt was agitated and combative, refusing to answer questions.     Katia met with Pt at bedside, alert and orientedx3. Pt declined to participate in sbirt screening but admitted to excessive alcohol intake. Pt was counseled and educated re alcohol. Katia suggested to Pt to consider Inpt  treatment. Pt declined active referral but opted for outpt support groups. Pt was offered support and was commended for admitting to accept help. AOSAS resources provided. Katia provides Pt with her business card and encourages her to call her if the need arise.     Katia outreached Pt's , Gallito ()  to  Pt from the ED.  Per Physician, Pt was medically ready for d/c. Gallito told Scottieakosua that he was not picking her up at that time because he believed that Pt was not ready for d/c considering her intox level when she came to the ED and hung up on Katia.  Medical team and Pt made aware.     Pt was given a tray of food as per her request. Pt was encouraged to cooperate with ED staff and to make her needs known in an appropriate manner. Pt voiced understanding..

## 2021-09-21 NOTE — ED PROVIDER NOTE - NSCAREINITIATED _GEN_ER
Helio Dominguez(Attending) A-T Advancement Flap Text: Given the location of the defect, inherent tension at the surgical site, and the proximity to free margins an A to T advancement flap was deemed most appropriate for wound reconstruction. The risks, benefits, and possible outcomes of this procedure were discussed along with the risks, benefits, and possible outcomes of other options for wound repair (including but not limited to: complex closure, other flaps, grafts, and second intention). The patient verbalized understanding and consent to the outlined procedure. The patient verbalized understanding that intraoperative conditions may necessitate a change in the outlined procedure resulting in modification of the original surgical plan. This decision may be made at the discretion of the surgeon, and is due to factors subject to change or that are difficult to predict preoperatively. Using a sterile surgical marker, an appropriate A to T advancement flap was drawn incorporating the defect and placing the expected incisions within the relaxed skin tension lines where possible. The surgical site and surrounding skin were prepped and draped in sterile fashion.  Standing cones were removed from opposite ends of the defect within the appropriate surgical plane, repositioning as necessary based upon local tension, proximity to free margins/other anatomic structures, and position of the relaxed skin tension lines. The shape of one standing cone was modified, taking two smaller standing cones (each approximately half the size of the traditional standing cone) and repositioning them along opposite ends of a lateral plane (with respect to the defect).  The resulting defect was undermined widely and bilaterally in the appropriate surgical plane taking care to preserve local arteries, veins, nerves, and other structures of anatomic importance. This created a sliding flap capable of advancing across the defect to close the wound under minimal tension. Hemostasis was achieved and then the wound was sutured in layered fashion.

## 2021-12-06 NOTE — ED ADULT TRIAGE NOTE - ESI TRIAGE ACUITY LEVEL, MLM
Fasting labs pending including hepatitis panel  Continue all therapy including omeprazole for the GERD  Do not resume the atorvastatin  Telmisartan is renewed  Continue healthy cardiac and healthy exercise walking  Return visit in 6 months pending the lab  
2

## 2022-01-01 NOTE — ED ADULT NURSE NOTE - BREATHING, MLM
" ICU Inborn Progress Notes      Age: 5 wk.o. Follow Up Provider:  Dr Prasad   Sex: female Admit Attending: Lindsay Narayanan MD   EMMA:  Gestational Age: 34w6d BW: 2620 g (5 lb 12.4 oz)   Corrected Gest. Age:  40w 0d    Subjective   Overview:    See Problem list.    Interval History:    Discussed with bedside nurse patient's course overnight. Nursing notes reviewed.    No significant changes reported. She is taking 100% PO.  Gained 19 grams.  Took 145ml/kg/day.  Tolerating 24kcal.  She pulled her NG out afternoon of  so we left it out.    Objective   Medications:     Scheduled Meds:  Poly-Vitamin/Iron, 0.5 mL, Oral, BID      Continuous Infusions:      PRN Meds:   •  hydrocortisone-bacitracin-zinc oxide-nystatin    Devices, Monitoring, Treatments:     Lines, Devices, Monitoring and Treatments:  [REMOVED] UVC Single Lumen 03/10/22 (Removed)   Site Assessment Clean;Dry;Intact 22 1500   Line Status Infusing 22 1500   Length camron (cm) 10 cm 22 1400   Line Care Connections checked and tightened 22 1400   Dressing Type Transparent 22 1400   Dressing Status Clean;Dry;Intact 22 1400   Indication/Daily Review of Necessity intravenous fluid therapy 22 1400           NG/OG Tube (James) Nasogastric Right nostril (Active)   Placement Verification Auscultation 22 0530   Site Assessment Clean;Dry;Intact;Non reddened 22 0530   Securement taped to cheek 22 0530   Secured at (cm) 19 22 0530   NG/OG Site Interventions Site assessed 22 0530   Dressing Intervention New dressing 04/10/22 0530   Status Clamped 22 0530       Necessity of devices was discussed with the treatment team and continued or discontinued as appropriate: yes    Respiratory Support:     Room air    Physical Exam:        Current: Weight: 3116 g (6 lb 13.9 oz) Birth Weight Change: 19%   Last HC: 13.58\" (34.5 cm)      PainScore:        Apnea and Bradycardia:   Apnea/Bradycardia " Events (last 14 days)     Date/Time SpO2 Heart Rate Episode Length (sec) Apnea Bradycardia   Desaturation Event Intervention [facility]    04/02/22 2321 98 58 15 bradycardia mild stimulation  positioning  EB    Intervention: repositioned by Radha Colbert RN at 04/02/22 2321    Association: possible reflux by Radha Colbert RN at 04/02/22 2321      Bradycardia rate: No data recorded    Temp:  [97.8 °F (36.6 °C)-98.8 °F (37.1 °C)] 98.1 °F (36.7 °C)  Pulse:  [135-166] 158  Resp:  [38-60] 60  BP: ()/(48-74) 102/74  SpO2 Current: SpO2  Min: 98 %  Max: 100 %    Heent: fontanelles are soft and flat    Respiratory: clear breath sounds bilaterally, no retractions or nasal flaring. Good air entry heard.    Cardiovascular: RRR, S1 S2, no murmurs, 2+ brachial and femoral pulses, brisk capillary refill   Abdomen: Soft, non tender,round, non-distended, good bowel sounds, no loops    : normal external genitalia   Extremities: well-perfused, warm and dry   Skin: no rashes, or bruising.   Neuro: easily aroused, active, alert     Radiology and Labs:      I have reviewed all the lab results for the past 24 hours. Pertinent findings reviewed in assessment and plan.  yes  Lab Results (last 24 hours)     ** No results found for the last 24 hours. **        I have reviewed all the imaging results for the past 24 hours. Pertinent findings reviewed in assessment and plan. yes    Intake and Output:      Current Weight: Weight: 3116 g (6 lb 13.9 oz) Last 24hr Weight change: 19 g (0.7 oz)   Growth:    7 day weight gain:  (to be calculated on M and Thu)   Caloric Intake: 128 Kcal/kg/day     Intake:     Total Fluid Goal: 160 ml/kg/day Total Fluid Actual: 145 ml/kg/day   Feeds: Formula  Neocate mixed to 24 tim/oz. 43-60 ml per feed q 3 hours Fortified: Yes with  Neocate to  24   Route:PO: 100%     IVF: none Blood Products: none   Output:     UOP: x 10 Emesis: x 1   Stool: x 4    Other: None         Assessment/Plan   Assessment and  Plan:      Respiratory distress syndrome in   Assessment:  Infant born at 34 6/7 weeks, 0 doses of BTMZ, respiratory distress at birth requiring CPAP 6, 50 FiO2. Initial CXR 9 ribs expanded and c/w TTNB and RDS. Initial venous gas 7.28/58/41/27.4/-0.7 with calculated sats of 84%. Infant successfully weaned to room air evening of 3/11/22    Last Venous Blood Gas  Lab Results   Component Value Date    PHVEN 7.375 (H) 2022    DXY3EOU 2022    PO2VEN 2022    DSQ5QKK 28.5 (H) 2022    BEVEN 2.2 (H) 2022    [id number] 84.2 (L) 2022     Plan:  • Resolved         In utero drug exposure  Assessment:  Mother with Toxassure on 9/10/2021 + THC and Ethyl alcohol.  - Maternal UDS (3/10): negative  - Infant's UDS negative  - Cord not saved  - Meconium Drug Screen sent 3/13/22: neg.  Plan:  · Social work consult      infant of 34 completed weeks of gestation  Assessment:  2620g female infant, [place], born at Gestational Age: 34w6d weeks to a 28 y.o.    mother with Type I DM (poor control), chronic HTN delivered via repeat  due to non-reassuring fetal heart tones. Fetal Echo and Ultrasound normal. BPP 8/8 in , but only 6/8 on 3/10 with NRFHTs so  done. Previous IUFD at 32 weeks. Mother's A1C was 12 on 9/10/2021, then 9.2 on 2022.  Maternal Hx of anxiety, depression, bipolar, obesity, coarctation of aorta, arthritis, asthma, eczema and pyelonephritis about 1 month prior to delivery.  Maternal Meds: PNV, insulin, norvasc, labetolol, procardia, cefazolin, flagyl  Prenatal Labs: O+, Ab-, RPR-NR, HepB-, RI, HIV-, GBS unkown, GC/Chl-, HSV1+, Toxassure THC, ethyl alcohol on 9/10/2021. COVID-  Vertex delivery via repeat  with spinal anesthesia due to non-reassuring fetal heart tones, 0h 01m  hours PTD with clear fluid, Delayed cord clamping not done due to uncontrolled DM. Apgars 7/8. Required CPAP 50% in delivery room, so admitted to  NICU for prematurity, respiratory distress, sepsis evaluation, thermal support, nutritional support.  Arterial cord gas - 7.1/98/<16/29.8/-3.1  Venous cord gas - 7.26/57/26/25.8/-2.3  - EES, Vit K and HepB Vaccine given on 2022.  - Placental pathology - no signs of chorioamnionitis.  - [place] Screen sent 3/12/22: see problem, but repeat NBS normal.  - CCHD Screen passed 3/12/22.  - Hearing passed 3/23/22.    Plan:  · Continuous CR monitor and pulse ox.  · Car Seat Test per protocol prior to discharge.  · Social work consult for resource identification.  · Follow up PCP is Dr. Prasad, appointment on 22 @ 8020  · OT consult due to prematurity.  · Mom and grandmother to room in next 48 hours.      Alteration in nutrition in infant  Assessment:  Infant made NPO on admission and started on D12.5 with Ca+ at 60 ml/kg/day via UVC. Initial blood glucose 43.  Mother plans on formula feeding.   - Enteral feedings initiated 3/11/22.   Current Diet: Neocate 24 tim/oz @ 43-60 mL PO/NG every 3 hours, 100% PO intake previous 24 hours. Emesis X 0. IDF Readiness Scores 1-2, Quality scores 2.  Using ANNA-Level 1 bottle with good improvement. Changed from purple on 3/24. Loose almost liquid stools and infant gassy/fussy per nursing .  Feeds changed to Neocate -present.  Changed to 24kcal on 22.  Fortification: 24 tim/oz   Access:  S/P UVC (3/10-3/17)  Rx: PVS w/ Fe (3/24-present)  -Patient pulled her NG out on  and it was left out.    Lab Results   Component Value Date     2022    K 2022     2022    CO2 2022     Lab Results   Component Value Date    CALCIUM 2022    PHOS 2022     Lab Results   Component Value Date    ALKPHOS 194 2022     - Blood noted in stool 3/16-17 with fairly large fissure at 1:00, abdominal exam benign, infant active alert. Continued blood, mucousy, loose stools and emesis combined with family history led to change  Spontaneous, unlabored and symmetrical in formula to Isomil on 3/19 with slow improvement in symptoms. No blood on 3/20.  KUB - no acute disease.  Of note, mom and brother had to be on soy as infants.  Brother/sibling had constipation which prompted change to soy.  No reported problems from dad's side of family per mom.  - Bloody mucous in stool noted 3/22/22, HemeOccult+, while on Isomil feedings.  KUB (3/22): normal bowel gas pattern. Formula changed to Alimentum for continued loose, mucous/bloody stools on 3/22/22. No further blood or mucous in stools, but still a little loose on 3/23, resolved on 3/25. Infant gassy, fussy, with continued emesis; changed to Neocate -present.  Weekly growth velocity:  45 grams in 7 days   (Currently plotting ~ 25th percentile, born @ 75th percentile)  Plan:  • Continue feedings of Neocate  24 kcal/oz @ 60 mL PO every 3 hours - monitor for improvement in stools and emesis.  Make her ad jade every 3 hours.  • Monitor formula tolerance and PO feeding efforts.  • Monitor for blood in stools and for any abdominal distension.  • I/Os  • RFP as needed  • Monitor growth and optimize nutrition  • Lactation consult  • Speech therapy consulted.  • Continue PVS w/ Fe BID.  • At risk for osteopenia of prematurity - CMP/Phos as needed  • Mom and grandmother rooming in tonight for 48 hours.      Thrombocytopenia, transient,   Assessment:  Infant's initial platelet count was 112K and noted to have oozing from umbilical cord. Mother's platelet count was 188K PTD.  Thrombocytopenia possibly due to maternal HTN.   Previous platelet count was 85K (3/18), 135K (3/21), and 176K( 3/24) Currently:      Lab 22  0522   HEMOGLOBIN 11.7   HEMATOCRIT 35.0   PLATELETS 343   CREATININE <0.17*   Fibrinogen (3/10): 121, (3/11): 152  Plan:  · Resolved    Ineffective thermoregulation in   Assessment:   Infant Gestational Age: 34w6d weeks at birth - at risk for ineffective thermoregulation.   Admission temp 98.1. Infant placed on  radiant warmer at admission for thermal support.  To open crib on 3/18/22.    Plan:  • Resolved       Infant of diabetic mother  Assessment:  Mother with Type I diabetes, non compliant and poorly controlled, with Hgb A1c of 12% at beginning of pregnancy, and most recently 9.1% on 22.  Infant at risk for hypoglycemia, electrolyte imbalances, poor feeding, feeding intolerance and poor growth.  - Initial blood glucose 42 mg/dL  - See nutrition problem and hypoglycemia problem.  Plan:     · Monitor electrolytes closely  · Monitor growth closely     hypoglycemia  Assessment: Maternal history of Type I diabetes, non compliant and not well controlled.  Infant with glucoses 27 mg/dL after admission prompting intervention.  S/P D10 bolus X 2.  Required increase in dextrose to D17.5% on 3/11/22 due to persistent hypoglycemia.  Experienced hyperglycemia 3/11/22 with Blood glucose max 196 mg/dL.  GIR adjusted down over the day 3/11/22, then blood glucose decreased to 40 mg/dL, requiring Y-in of D17.5 to TPN/IL to increase GIR.   - IV fluids changed to D17.5 1/4 NS with KCl, Ca Gluconate and Heparin via UVC on 3/12/22 in order to quickly adjust GIR as needed.  - Enteral feedings started 3/11/22 with Neosure, changed to SSC24 on 3/12/22 to increase enteral calories, then to Isomil on 3/19/22  -GIR weaned over several days and glucose stable on enteral feedings   Plan:  · Resolved         Hyperbilirubinemia of prematurity  Assessment: Hyperbilirubinemia most likely due to: physiologic hyperbilirubinemia or prematurity   Mother's blood type: O+, Ab negative; Baby's blood type O+, Jasson negative.  TB  3.2 mg/dL @ 9 hours of life . Phototherapy not yet indicated.  LIVER FUNCTION TESTS:      Lab 03/15/22  0453 22  0426 22  0437 22  0430 22  1752 22  0503   ALBUMIN 2.60* 2.70* 2.90 3.00 3.20 3.20   BILIRUBIN 3.0  --  7.1 6.5  --  3.2   INDIRECT BILIRUBIN 2.6  --  6.6 6.1  --  2.9   BILIRUBIN  DIRECT 0.4  --  0.5 0.4  --  0.3     Plan:  · Resolved.    Abnormal findings on  screening  Assessment:   screen sent 3/12/22 with following abnormalities: Leucine 338(abnormal > 300), Methionine 76.6(abnormal >50), Phenylalanine 123.6(abnormal > 120).  Likely related to slow initiation of feeds.  - Repeat  Screen sent 3/18/22: normal  Plan:  · Resolved.    PFO (patent foramen ovale)  Assessment:  Echo on 3/19 showed PFO with left to right shunting.    Plan:  · Follow up with Ped card in 6 months.    Cyanotic episodes in   Assessment:  Infant is a premature infant born at 34 6/7 weeks. No B/D events in the last 24 hours. Previously 1 event on ,  possibly reflux related requiring mild stimulation/repositioning.   Plan:  · Monitor for further events.  · Must be event free x 5 days PTD.        Anemia of prematurity  Assessment: Infant with anemia of prematurity.  Initial H/H (3/10); 14.5/46.1.  Most recent labs:   Lab Results   Component Value Date    HGB 2022      Lab Results   Component Value Date    HCT 2022      Lab Results   Component Value Date    RETICCTPCT 0.53 (L) 2022     Transfusion Hx: None   Rx: Poly-vi-sol with Iron 0.5 mL PO every 12 hours  Plan:  · CBC prn.  · Continue Poly-vi-sol with Iron              Discharge Planning:         Testing  CCHD Initial CCHD Screening  SpO2: Pre-Ductal (Right Hand): 99 % (22 1700)  SpO2: Post-Ductal (Left or Right Foot): 100 (22 1700)   Car Seat Challenge Test     Hearing Screen       Screen       Immunization History   Administered Date(s) Administered   • Hep B, Adolescent or Pediatric 2022         Expected Discharge Date: [place]    Social comments: Mother involved in infant's care.  Grandmother is her support person. Mom is not working this weekend so she is able to room in.  Plan to room in 48 hours to show they can feed her and she can gain appropriate weight and tolerate  appropriate volumes.  WIC form given to nursing.  Family Communication: Mother updated daily.  I updated mom on the phone today.    Mom/Kasey [phone]  Grandmother [phone]    Jose Medina MD  2022  15:33 CDT    Patient rounds conducted with Dr. Medina, NP and bedside nurse.      This patient is under constant supervision by the healthcare team and is requiring intensive cardiac and respiratory monitoring, including frequent or continuous vital sign monitoring, maintenance of neutral thermal environment and/or nutritional management. Current status and treatment is delineated in the above problem list.

## 2022-01-01 NOTE — ED ADULT TRIAGE NOTE - NS ED NURSE BANDS TYPE
Pediatric [place] Progress Note    Subjective:     RODRÍGUEZ Nathan has been doing well and feeding well. Objective:     Estimated Gestational Age: Gestational Age: 39w0d    Weight: 3.63 kg (Filed from Delivery Summary)      Weight change since birth: 0%    Intake and Output:    No intake/output data recorded.  1901 -  0700  In: -   Out: 1   Patient Vitals for the past 24 hrs:   Urine Occurrence(s)   22 0600 1   22 0100 1     Patient Vitals for the past 24 hrs:   Stool Occurrence(s)   22 2100 1   22 1800 1              Pulse 112, temperature 99 °F (37.2 °C), resp. rate 36, height 0.502 m, weight 3.63 kg, head circumference 30.5 cm. Physical Exam:   General: healthy-appearing, vigorous infant. Strong cry. Head: sutures lines are open,fontanelles soft, flat and open  Chest: lungs clear to auscultation, unlabored breathing, no clavicular crepitus  Heart: RRR, S1 S2, no murmurs  Abd: Soft, non-tender, no masses, no HSM, nondistended, umbilical stump clean and dry  Pulses: strong equal femoral pulses, brisk capillary refill  Extremities: well-perfused, warm and dry  Neuro: easily aroused  Good symmetric tone and strength  Positive root and suck. Symmetric normal reflexes  Skin: warm and pink        Labs:  No results found for this or any previous visit (from the past 24 hour(s)). Assessment:     Active Problems:    Liveborn infant by vaginal delivery (2022)        Plan:     Continue routine care.     Signed By:  Oly Thorpe DO     2022 Name band;

## 2022-02-15 NOTE — ED PROVIDER NOTE - EYES, MLM
Clear bilaterally, pupils equal, round and reactive to light.
Simple: Patient demonstrates quick and easy understanding/Verbalized Understanding

## 2022-07-20 NOTE — PATIENT PROFILE ADULT - FUNCTIONAL SCREEN CURRENT LEVEL: DRESSING, MLM
Detail Level: Zone
Detail Level: Generalized
Detail Level: Simple
Detail Level: Detailed
0 = independent

## 2022-10-01 NOTE — ED PROVIDER NOTE - CPE EDP SKIN NORM
[facility] DIVISION OF KIDNEY DISEASES AND HYPERTENSION -- FOLLOW UP NOTE  --------------------------------------------------------------------------------    Patient seen and examined this morning sitting in his chair. Patient unable to speak but able to shake/nod his head. He denied any chest pain, shortness of breath, nausea, or vomiting. No acute issues noted overnight.         PAST HISTORY  --------------------------------------------------------------------------------  No significant changes to PMH, PSH, FHx, SHx, unless otherwise noted    ALLERGIES & MEDICATIONS  --------------------------------------------------------------------------------  Allergies    erythromycin (Unknown)  No Known Drug Allergies    Intolerances      Standing Inpatient Medications  acetylcysteine 20%  Inhalation 4 milliLiter(s) Inhalation every 8 hours  albuterol/ipratropium for Nebulization 3 milliLiter(s) Nebulizer every 8 hours  argatroban Infusion 0.76 MICROgram(s)/kG/Min IV Continuous <Continuous>  artificial tears (preservative free) Ophthalmic Solution 1 Drop(s) Both EYES two times a day  aspirin  chewable 81 milliGRAM(s) Oral <User Schedule>  atorvastatin 40 milliGRAM(s) Oral at bedtime  busPIRone 10 milliGRAM(s) Oral every 8 hours  chlorhexidine 0.12% Liquid 15 milliLiter(s) Oral Mucosa two times a day  chlorhexidine 2% Cloths 1 Application(s) Topical <User Schedule>  dextrose 5%. 1000 milliLiter(s) IV Continuous <Continuous>  dextrose 5%. 1000 milliLiter(s) IV Continuous <Continuous>  dextrose 50% Injectable 25 Gram(s) IV Push once  dextrose 50% Injectable 12.5 Gram(s) IV Push once  dextrose 50% Injectable 25 Gram(s) IV Push once  digoxin     Tablet 125 MICROGram(s) Oral <User Schedule>  droxidopa 400 milliGRAM(s) Oral every 8 hours  DULoxetine 30 milliGRAM(s) Oral daily  epoetin mary beth-epbx (RETACRIT) Injectable 3000 Unit(s) IV Push <User Schedule>  ertapenem  IVPB 500 milliGRAM(s) IV Intermittent <User Schedule>  fludroCORTISONE 0.1 milliGRAM(s) Oral <User Schedule>  glucagon  Injectable 1 milliGRAM(s) IntraMuscular once  insulin lispro (ADMELOG) corrective regimen sliding scale   SubCutaneous every 6 hours  midodrine 15 milliGRAM(s) Oral every 8 hours  mirtazapine 30 milliGRAM(s) Oral at bedtime  Nephro-vazquez 1 Tablet(s) Oral daily  nystatin    Suspension 917279 Unit(s) Oral every 6 hours  pantoprazole  Injectable 40 milliGRAM(s) IV Push daily  predniSONE   Tablet 5 milliGRAM(s) Oral daily  vancomycin    Solution 125 milliGRAM(s) Oral every 6 hours    PRN Inpatient Medications  acetaminophen     Tablet .. 650 milliGRAM(s) Oral every 6 hours PRN  aluminum hydroxide/magnesium hydroxide/simethicone Suspension 30 milliLiter(s) Oral every 4 hours PRN  calamine/zinc oxide Lotion 1 Application(s) Topical every 6 hours PRN  dextrose Oral Gel 15 Gram(s) Oral once PRN  lidocaine   4% Patch 1 Patch Transdermal daily PRN  sodium chloride 0.9% lock flush 10 milliLiter(s) IV Push every 1 hour PRN      REVIEW OF SYSTEMS    All other systems were reviewed and are negative, except as noted.    VITALS/PHYSICAL EXAM  --------------------------------------------------------------------------------  T(C): 36.4 (10-01-22 @ 08:00), Max: 37 (09-30-22 @ 16:00)  HR: 67 (10-01-22 @ 11:00) (66 - 94)  BP: --  RR: 13 (10-01-22 @ 11:00) (11 - 22)  SpO2: 96% (10-01-22 @ 11:00) (95% - 100%)  Wt(kg): --        09-30-22 @ 07:01  -  10-01-22 @ 07:00  --------------------------------------------------------  IN: 1710.5 mL / OUT: 475 mL / NET: 1235.5 mL    10-01-22 @ 07:01  -  10-01-22 @ 11:49  --------------------------------------------------------  IN: 124.7 mL / OUT: 0 mL / NET: 124.7 mL        Physical Exam:  	Gen: ill appearing  	HEENT: trach  	Pulm: CTA B/L  	CV: S1S2  	Abd: Soft, +BS   	Ext: No LE edema B/L  	Neuro: Awake and alert   	Skin: Warm and dry  	Vascular access: LIJ non-tunneled HD catheter      LABS/STUDIES  --------------------------------------------------------------------------------              9.3    16.74 >-----------<  314      [10-01-22 @ 01:09]              29.5     136  |  97  |  49  ----------------------------<  118      [10-01-22 @ 01:09]  3.4   |  23  |  2.79        Ca     9.2     [10-01-22 @ 01:09]      Mg     2.4     [10-01-22 @ 01:09]      Phos  4.7     [10-01-22 @ 01:09]    TPro  6.1  /  Alb  3.5  /  TBili  0.2  /  DBili  x   /  AST  10  /  ALT  13  /  AlkPhos  93  [10-01-22 @ 01:09]    PT/INR: PT 14.2 , INR 1.22       [10-01-22 @ 01:09]  PTT: 52.6       [10-01-22 @ 07:32]      Creatinine Trend:  SCr 2.79 [10-01 @ 01:09]  SCr 2.64 [09-30 @ 12:40]  SCr 2.27 [09-30 @ 01:24]  SCr 2.95 [09-29 @ 01:17]  SCr 2.02 [09-28 @ 00:26]        Iron 74, TIBC 211, %sat 35      [06-24-22 @ 11:55]  Ferritin 2029      [06-24-22 @ 11:55]  TSH 0.31      [09-26-22 @ 00:15]  Lipid: chol --, , HDL --, LDL --      [05-29-22 @ 00:12]       normal...

## 2023-03-15 NOTE — CHART NOTE - NSCHARTNOTESELECT_GEN_ALL_CORE
Event Note Hypercholesterolemia Monitoring: I explained this is common when taking isotretinoin. We will monitor closely.

## 2023-04-14 NOTE — ED ADULT NURSE NOTE - PAIN RATING/NUMBER SCALE (0-10): ACTIVITY
Patient comes to clinic for initial anticoagulation visit. Prior to establishing with our clinic this morning, he was managed through [facility]. Patient's most recent INR at that location was 2.3 on 4/6/23. Dose maintained.     Today's INR is 1.5 and is below goal range of 2.0-3.0. Current warfarin total weekly dose of 16 mg verified. Denies any missed doses. Started Doxycycline BID on 4/11/23 x 7 days. Did take two doses on 4/11/23. According to [employer], Doxycycline has potential to increase effect of Warfarin. Informed Andres the INR result is below therapeutic range and instructed to take extra 1/2 tab today only. Protocol recommends extra full dose. Per [facility] RN, with upcoming procedure and slight potential for antibiotic to still elevate INR, full extra 4mg could cause supra therapeutic INR. Discussed dosing plan and return date of 4/18/23 for next INR. Recheck 4/18 per EP request due to cardiac ablation with Dr Aguiar on 4/20/23. Per Dr Aguiar, patient will hold Warfarin on 4/19/23.  See Anticoagulation flowsheet.    Faith Contreras NP is in the office today supervising the treatment. Note forwarded for review. Decision making is not within guideline.    Call your physician immediately if you notice any of the following symptoms of a blood clot:   · Sudden weakness in any limb  · Numbness or tingling anywhere  · Visual changes or loss of sight in either eye  · Sudden onset of slurred speech or inability to speak  · Dizziness or faintness  · New pain, swelling, redness or heat in any extremity  · New SOB or chest pain  Symptoms associated with blood clotting/low INR reviewed and verbalizes understanding.    Instructed to contact the clinic with any unusual bleeding or bruising, any changes in medications, diet, health status, lifestyle, or any other changes, questions or concerns. Verbalized understanding of all discussed.     Patient education topics as shown below were reviewed. Anticoagulation handouts given.  Questions answered.       Patient Education Topics reviewed  Rationale for therapy  How warfarin works to benefit the patient  Dosing and administration (timing)  Importance of adherence with dosing instructions and timely follow up  What to do about missed doses  Tablet identification/strength  Potential drug interaction  Avoidance of aspirin/NSAIDS  Activities/fall prevention  Dietary considerations - including alcohol use  Importance of regular laboratory and clinic monitoring  Signs of over-anticoagulation  What to do in case of bleeding  Interruption in therapy due to surgery or dental work  Informing other healthcare professionals  Warfarin identification card/bracelet  Refill procedures  Planning for trips  Safe storage from children and pets.     0

## 2023-04-19 NOTE — ED PROVIDER NOTE - CLINICAL SUMMARY MEDICAL DECISION MAKING FREE TEXT BOX
Is This A New Presentation, Or A Follow-Up?: Skin Lesions What Type Of Note Output Would You Prefer (Optional)?: Standard Output How Severe Is Your Skin Lesion?: moderate Has Your Skin Lesion Been Treated?: not been treated Patient presenting to the ED for alcohol intoxication. Patient had to be sedated for further evaluation. Will do labs, observe pending sobriety.

## 2023-08-02 NOTE — PATIENT PROFILE ADULT - BRADEN ACTIVITY
3) walks occasionally

## 2023-08-04 NOTE — PATIENT PROFILE ADULT - LIVING ENVIRONMENT
Detail Level: Detailed Add 1585x Cpt? (Do Not Bill If You Billed For The Procedure Placing The Sutures. This Is An Add-On Code That Must Be Billed With An E/M Visit Code): No no

## 2023-08-09 NOTE — ED ADULT NURSE NOTE - HIV OFFER
----- Message from [address] [place] [address] sent at 8/4/2023  2:36 PM EDT -----  Please let her know the sti culture is negative. Thank you! Previously Declined (within the last year)

## 2023-09-11 NOTE — PROGRESS NOTE ADULT - PROBLEM SELECTOR PLAN 3
Pt has PMH of Depression and PTSD   As per , Pt has been displaying some suicidal thoughts recently   Pt might benefit from Inpatient Psych evaluation   Home meds- Citalopram  c/w home medication Pt has PMH of Depression and PTSD   As per , Pt has been displaying some suicidal thoughts recently   - h/o depression  - on citalopram at home  - c/w home medication - h/o Depression and PTSD   - per her , pt has displayed suicidal thoughts recently   - on citalopram at home  - c/w citalopram 25mg qd  - will consider psych consult Alert and oriented to person, place and time

## 2023-11-27 NOTE — BEHAVIORAL HEALTH ASSESSMENT NOTE - NS ED BHA MSE SPEECH ARTICULATION
Detail Level: Detailed
Quality 110: Preventive Care And Screening: Influenza Immunization: Influenza Immunization Administered during Influenza season
Normal

## 2024-02-08 NOTE — ED PROVIDER NOTE - INTERPRETATION
[Negative] : Respiratory [Fever] : no fever [Chills] : no chills [Chest Pain] : no chest pain [Palpitations] : no palpitations normal sinus rhythm

## 2024-02-14 NOTE — BEHAVIORAL HEALTH ASSESSMENT NOTE - SOURCE OF INFORMATION
Per Dr Lyles patient must have inpatient heparin bridge. Message also sent to Dr Castaneda.   Patient

## 2025-03-03 NOTE — ED ADULT NURSE NOTE - CAS EDN DISCHARGE ASSESSMENT
3/3/2025      Kenya Zurita  [address]  [address]  [place] [address]      Dear Colleague,    Thank you for referring your patient, Kenya Zurita, to the [facility]. Please see a copy of my visit note below.    Surgery Post-op Note  [facility]  [address] [place] [address]  T: [phone]  F: [phone]    Subjective:     Kenya Zurita presents to the clinic after undergoing umbilical hernia repair and sebaceous cyst excision on 2/16/25.  Patient is here for follow up. The patient reports she has ongoing incisional pain.  Patient is currently tolerating a regular diet.  Patient denies significant nausea or vomiting. She has not tried taking much for pain but she is worried pain may be exacerbated by returning to work without restrictions. She has been working with light duties but continues to have pain in her abdomen and flanks        Objective:     Vitals:   /85 (BP Location: Right arm, Patient Position: Chair, Cuff Size: Adult Regular)   Pulse 81   Temp 98.2  F (36.8  C) (Tympanic)   Wt 53.5 kg (118 lb)   BMI 20.90 kg/m     General Appearance:    Kenya is a well-appearing thin  female who is in no acute distress.   Cardiac:    RRR, extremities warm and well perfused    Pulmonary:  Nonlabored breathing on room air   Abdomen:    Soft, flat, tender to palpation lateral to incision. No appreciable hernia recurrence   Incision: The incision is healing well. There are no signs of cellulitis or drainage.        Labs/Imaging:     None       Pathology:     Final Diagnosis   Hernia sac, excision-  -Subcutaneous tissue with Epidermal Inclusion Cyst.        Assessment:     Ms.Evon LATONYA Zurita is status post open umbilical hernia repair. She continues to have pain in her abdomen and is anxious about returning to work without restrictions      Plan:   1. Continue light duties and avoid heavy lifting for an additional 1 week  2. Return to my  clinic in 1 week for wound check  3. Trial PRN tylenol, heating, and icing for residual abdominal discomfort  4. Obtain US of abdomen to assess for recurrent umbilical hernia    Vj Carlton MD on 3/3/2025 at 12:46 PM       Again, thank you for allowing me to participate in the care of your patient.        Sincerely,        Vj Carlton MD    Electronically signed Alert and oriented to person, place and time

## 2025-05-29 NOTE — PROGRESS NOTE ADULT - ASSESSMENT
Refill request is for a maintenance medication and has met the criteria specified in the Ambulatory Medication Refill Standing Order for eligibility, visits, laboratory, alerts and was sent to the requested pharmacy.    Requested Prescriptions     Signed Prescriptions Disp Refills    METOPROLOL SUCCINATE ER 50 MG Oral Tablet 24 Hr 90 tablet 3     Sig: TAKE 1 TABLET EVERY DAY     Authorizing Provider: FANTASMA LUCERO     Ordering User: HOLLY SORENSEN        Patient is a 36 year old female patient, w/ PMH of alcohol abuse/withdrawal, depression, PTSD, and HTN, who came to the ED after being found with 10 bottle of empty tequila around her. Admitted for ETOH abuse, started on CIWA and ativan taper  Patient is a 36 year old female patient, w/ PMH of alcohol abuse/withdrawal, depression, PTSD, and HTN, who came to the ED after being found with 10 bottle of empty tequila around her. Admitted for ETOH abuse, started on CIWA and ativan taper.  CIWA remains 0, no use of PRNs in last 24 hours. Telepsych consulted for patient's request tearfulness. Reccs appreciated,  Plan for discharge home tomorrow  with outpatient follow up